# Patient Record
Sex: MALE | Race: ASIAN | Employment: OTHER | URBAN - METROPOLITAN AREA
[De-identification: names, ages, dates, MRNs, and addresses within clinical notes are randomized per-mention and may not be internally consistent; named-entity substitution may affect disease eponyms.]

---

## 2017-01-01 ENCOUNTER — CARE COORDINATION (OUTPATIENT)
Dept: ONCOLOGY | Facility: CLINIC | Age: 72
End: 2017-01-01

## 2017-01-01 ENCOUNTER — APPOINTMENT (OUTPATIENT)
Dept: LAB | Facility: CLINIC | Age: 72
End: 2017-01-01
Payer: MEDICARE

## 2017-01-01 ENCOUNTER — CARE COORDINATION (OUTPATIENT)
Dept: CARDIOLOGY | Facility: CLINIC | Age: 72
End: 2017-01-01

## 2017-01-01 ENCOUNTER — MEDICAL CORRESPONDENCE (OUTPATIENT)
Dept: HEALTH INFORMATION MANAGEMENT | Facility: CLINIC | Age: 72
End: 2017-01-01

## 2017-01-01 ENCOUNTER — OFFICE VISIT (OUTPATIENT)
Dept: TRANSPLANT | Facility: CLINIC | Age: 72
End: 2017-01-01
Payer: MEDICARE

## 2017-01-01 VITALS
HEART RATE: 67 BPM | WEIGHT: 143.1 LBS | RESPIRATION RATE: 16 BRPM | TEMPERATURE: 97.4 F | DIASTOLIC BLOOD PRESSURE: 81 MMHG | BODY MASS INDEX: 22.41 KG/M2 | OXYGEN SATURATION: 100 % | SYSTOLIC BLOOD PRESSURE: 159 MMHG

## 2017-01-01 DIAGNOSIS — C82.90 FOLLICULAR LYMPHOMA (H): Primary | ICD-10-CM

## 2017-01-01 DIAGNOSIS — C82.99 NODULAR LYMPHOMA OF EXTRANODAL AND/OR SOLID ORGAN SITE (H): Primary | ICD-10-CM

## 2017-01-01 DIAGNOSIS — I50.43 ACUTE ON CHRONIC COMBINED SYSTOLIC AND DIASTOLIC HEART FAILURE (H): ICD-10-CM

## 2017-01-01 DIAGNOSIS — R06.02 SHORTNESS OF BREATH: Primary | ICD-10-CM

## 2017-01-01 DIAGNOSIS — I25.5 ISCHEMIC CARDIOMYOPATHY: ICD-10-CM

## 2017-01-01 LAB
ALBUMIN SERPL-MCNC: 3.5 G/DL (ref 3.4–5)
ALP SERPL-CCNC: 100 U/L (ref 40–150)
ALT SERPL W P-5'-P-CCNC: 27 U/L (ref 0–70)
ANION GAP SERPL CALCULATED.3IONS-SCNC: 7 MMOL/L (ref 3–14)
ANION GAP SERPL CALCULATED.3IONS-SCNC: 9 MMOL/L (ref 3–14)
AST SERPL W P-5'-P-CCNC: 32 U/L (ref 0–45)
BASOPHILS # BLD AUTO: 0.1 10E9/L (ref 0–0.2)
BASOPHILS NFR BLD AUTO: 0.8 %
BILIRUB SERPL-MCNC: 0.5 MG/DL (ref 0.2–1.3)
BUN SERPL-MCNC: 24 MG/DL (ref 7–30)
BUN SERPL-MCNC: 27 MG/DL (ref 7–30)
CALCIUM SERPL-MCNC: 8.6 MG/DL (ref 8.5–10.1)
CALCIUM SERPL-MCNC: 8.9 MG/DL (ref 8.5–10.1)
CHLORIDE SERPL-SCNC: 96 MMOL/L (ref 94–109)
CHLORIDE SERPL-SCNC: 98 MMOL/L (ref 94–109)
CO2 SERPL-SCNC: 31 MMOL/L (ref 20–32)
CO2 SERPL-SCNC: 32 MMOL/L (ref 20–32)
CREAT SERPL-MCNC: 1.19 MG/DL (ref 0.66–1.25)
CREAT SERPL-MCNC: 1.33 MG/DL (ref 0.66–1.25)
DIFFERENTIAL METHOD BLD: ABNORMAL
EOSINOPHIL # BLD AUTO: 0.2 10E9/L (ref 0–0.7)
EOSINOPHIL NFR BLD AUTO: 3.2 %
ERYTHROCYTE [DISTWIDTH] IN BLOOD BY AUTOMATED COUNT: 12.9 % (ref 10–15)
GFR SERPL CREATININE-BSD FRML MDRD: 53 ML/MIN/1.7M2
GFR SERPL CREATININE-BSD FRML MDRD: 60 ML/MIN/1.7M2
GLUCOSE SERPL-MCNC: 108 MG/DL (ref 70–99)
GLUCOSE SERPL-MCNC: 99 MG/DL (ref 70–99)
HCT VFR BLD AUTO: 39.1 % (ref 40–53)
HGB BLD-MCNC: 13 G/DL (ref 13.3–17.7)
IMM GRANULOCYTES # BLD: 0 10E9/L (ref 0–0.4)
IMM GRANULOCYTES NFR BLD: 0.3 %
LDH SERPL L TO P-CCNC: 238 U/L (ref 85–227)
LYMPHOCYTES # BLD AUTO: 1.8 10E9/L (ref 0.8–5.3)
LYMPHOCYTES NFR BLD AUTO: 28.5 %
MCH RBC QN AUTO: 32.8 PG (ref 26.5–33)
MCHC RBC AUTO-ENTMCNC: 33.2 G/DL (ref 31.5–36.5)
MCV RBC AUTO: 99 FL (ref 78–100)
MONOCYTES # BLD AUTO: 0.7 10E9/L (ref 0–1.3)
MONOCYTES NFR BLD AUTO: 11.2 %
NEUTROPHILS # BLD AUTO: 3.5 10E9/L (ref 1.6–8.3)
NEUTROPHILS NFR BLD AUTO: 56 %
NRBC # BLD AUTO: 0 10*3/UL
NRBC BLD AUTO-RTO: 0 /100
NT-PROBNP SERPL-MCNC: 7088 PG/ML (ref 0–125)
PLATELET # BLD AUTO: 136 10E9/L (ref 150–450)
POTASSIUM SERPL-SCNC: 3.5 MMOL/L (ref 3.4–5.3)
POTASSIUM SERPL-SCNC: 3.9 MMOL/L (ref 3.4–5.3)
PROT SERPL-MCNC: 6.8 G/DL (ref 6.8–8.8)
RBC # BLD AUTO: 3.96 10E12/L (ref 4.4–5.9)
SODIUM SERPL-SCNC: 136 MMOL/L (ref 133–144)
SODIUM SERPL-SCNC: 137 MMOL/L (ref 133–144)
WBC # BLD AUTO: 6.3 10E9/L (ref 4–11)

## 2017-01-01 PROCEDURE — 99212 OFFICE O/P EST SF 10 MIN: CPT | Mod: ZF

## 2017-01-01 PROCEDURE — 85025 COMPLETE CBC W/AUTO DIFF WBC: CPT

## 2017-01-01 PROCEDURE — 36415 COLL VENOUS BLD VENIPUNCTURE: CPT

## 2017-01-01 PROCEDURE — 83615 LACTATE (LD) (LDH) ENZYME: CPT

## 2017-01-01 PROCEDURE — 80053 COMPREHEN METABOLIC PANEL: CPT

## 2017-01-01 RX ORDER — ATORVASTATIN CALCIUM 40 MG/1
40 TABLET, FILM COATED ORAL
COMMUNITY
Start: 2016-08-23 | End: 2017-01-01

## 2017-01-01 RX ORDER — TORSEMIDE 20 MG/1
20 TABLET ORAL DAILY
COMMUNITY

## 2017-01-01 ASSESSMENT — PAIN SCALES - GENERAL: PAINLEVEL: MILD PAIN (3)

## 2017-05-01 NOTE — PROGRESS NOTES
Reason for Outgoing Call:   To notify pt that lab appt will be added prior to annual visit tomorrow with Dr. Ernandez unless he has had labs done in recent past  Patient Questions/Concerns:   Pt states he has had several visits with cardiology at [facility] and wishes that we access those records for Dr. Ernandez's review.  Nursing Action/Patient Instruction:  - Accessed [facility] records via Care Everywhere while on the phone with pt and explained to him that we are able to view cardiology exam reports  - Recent labs did not include CBC, CMP or LDH. Lab orders placed for tomorrow's appt and message to [facility] Scheduling  - Introduced self and my role as RN Care Coordinator at [facility] for Dr Ernandez's pts.  Patient Response/Evaluation:   Pt voiced understanding and apreciation of above instructions and information and denied further questions

## 2017-05-02 NOTE — NURSING NOTE
"Oncology Rooming Note    May 2, 2017 6:04 PM   Tristen Beckwith is a 72 year old male who presents for:    Chief Complaint   Patient presents with     Oncology Clinic Visit     NHL     Initial Vitals: /81  Pulse 67  Temp 97.4  F (36.3  C) (Oral)  Resp 16  Wt 64.9 kg (143 lb 1.6 oz)  SpO2 100%  BMI 22.41 kg/m2 Estimated body mass index is 22.41 kg/(m^2) as calculated from the following:    Height as of 11/6/14: 1.702 m (5' 7\").    Weight as of this encounter: 64.9 kg (143 lb 1.6 oz). Body surface area is 1.75 meters squared.  Mild Pain (3) Comment: Data Unavailable   No LMP for male patient.  Allergies reviewed: Yes  Medications reviewed: Yes    Medications: Medication refills not needed today.  Pharmacy name entered into EPIC:    [facility] - [place], [place] - [address] [id number] - [place], [place] - [address]  [facility] [address] [place], [place] - [address] AT [facility] & [address] [facility] STORE [address] [place], [place] - [address] AT [facility]    Clinical concerns: n/a     7 minutes for nursing intake (face to face time)     SARAH METZGER CMA              "

## 2017-05-02 NOTE — MR AVS SNAPSHOT
"              After Visit Summary   5/2/2017    Tristen Beckwith    MRN: [id number]           Patient Information     Date Of Birth          1945        Visit Information        Provider Department      5/2/2017 5:30 PM Daisy Ernandez MD [facility] Blood and Marrow Transplant        Today's Diagnoses     Nodular lymphoma of extranodal and/or solid organ site (H)    -  1          [facility] ([facility])  [address] [place] [address]  Phone: [phone]  Clinic Hours:   Monday-Thursday: 7am to 7pm   Friday: 7am to 5:30pm   Weekends and holidays:    8am to noon (in general)  If your fever is 100.5  or greater,   call the clinic.  After hours call the   hospital at [phone] or   [phone]. Ask for the BMT   fellow on-call            Follow-ups after your visit        Who to contact     If you have questions or need follow up information about today's clinic visit or your schedule please contact [facility] BLOOD AND MARROW TRANSPLANT directly at [phone].  Normal or non-critical lab and imaging results will be communicated to you by Neighborlandhart, letter or phone within 4 business days after the clinic has received the results. If you do not hear from us within 7 days, please contact the clinic through Enkiat or phone. If you have a critical or abnormal lab result, we will notify you by phone as soon as possible.  Submit refill requests through Seesearch or call your pharmacy and they will forward the refill request to us. Please allow 3 business days for your refill to be completed.          Additional Information About Your Visit        Seesearch Information     Seesearch lets you send messages to your doctor, view your test results, renew your prescriptions, schedule appointments and more. To sign up, go to [email or web address] . Click on \"Log in\" on the left side of the screen, which will take you to the Welcome page. Then click on \"Sign up Now\" on the right side of the page.     You " will be asked to enter the access code listed below, as well as some personal information. Please follow the directions to create your username and password.     Your access code is: [email or web address]  Expires: 2017  6:31 AM     Your access code will  in 90 days. If you need help or a new code, please call your [place] clinic or [phone].        Care EveryWhere ID     This is your Care EveryWhere ID. This could be used by other organizations to access your [place] medical records  [id number]        Your Vitals Were     Pulse Temperature Respirations Pulse Oximetry BMI (Body Mass Index)       67 97.4  F (36.3  C) (Oral) 16 100% 22.41 kg/m2        Blood Pressure from Last 3 Encounters:   17 159/81   16 139/71   14 154/81    Weight from Last 3 Encounters:   17 64.9 kg (143 lb 1.6 oz)   16 64.4 kg (142 lb)   14 64.9 kg (143 lb)              We Performed the Following     CBC with platelets differential     Comprehensive metabolic panel     Lactate Dehydrogenase        Recent Review Flowsheet Data     BMT Recent Results Latest Ref Rng & Units 2014 4/15/2014 2014 2014 10/27/2014 2016 2017    WBC 4.0 - 11.0 10e9/L - 4.3 4.4 - 5.5 6.0 6.3    Hemoglobin 13.3 - 17.7 g/dL - 11.8(L) 12.2(L) - 13.1(L) 13.3 13.0(L)    Platelet Count 150 - 450 10e9/L - 150 141(L) - 121(L) 97(L) 136(L)    Neutrophils (Absolute) 1.6 - 8.3 10e9/L - 2.8 2.4 - 3.1 3.2 3.5    INR 0.86 - 1.14 - - - - - - -    Sodium 133 - 144 mmol/L 136 133 136 - 132(L) 133 137    Potassium 3.4 - 5.3 mmol/L 4.4 4.4 4.4 - 4.0 3.9 3.5    Chloride 94 - 109 mmol/L 94 93(L) 95 - 99 95 96    Glucose 70 - 99 mg/dL 78 81 114(H) 111(H) 92 86 108(H)    Urea Nitrogen 7 - 30 mg/dL 15 14 18 - 19 21 24    Creatinine 0.66 - 1.25 mg/dL 0.82 1.10 1.09 - 1.04 1.15 1.19    Calcium (Total) 8.5 - 10.1 mg/dL 8.6 9.2 9.1 - 9.2 8.9 8.6    Protein (Total) 6.8 - 8.8 g/dL - - 6.5(L) - 6.7(L) 7.4 6.8    Albumin 3.4 - 5.0 g/dL -  - 3.7 - 3.8 3.8 3.5    Alkaline Phosphatase 40 - 150 U/L - - 369(H) - 90 90 100    AST 0 - 45 U/L - - 34 - 37 35 32    ALT 0 - 70 U/L - - 27 - 45 40 27    MCV 78 - 100 fl - 97 93 - 96 96 99               Primary Care Provider Office Phone # Fax #    Daisy Ernandez [phone]        [facility] [address] [facility] [address]  [facility] [address]        Thank you!     Thank you for choosing [facility] BLOOD AND MARROW TRANSPLANT  for your care. Our goal is always to provide you with excellent care. Hearing back from our patients is one way we can continue to improve our services. Please take a few minutes to complete the written survey that you may receive in the mail after your visit with us. Thank you!             Your Updated Medication List - Protect others around you: Learn how to safely use, store and throw away your medicines at www.disposemymeds.org.          This list is accurate as of: 5/2/17 11:59 PM.  Always use your most recent med list.                   Brand Name Dispense Instructions for use    aspirin 325 MG tablet      Take 1/2 tablet daily       atorvastatin 40 MG tablet    LIPITOR     Take 40 mg by mouth       lisinopril 40 MG tablet    PRINIVIL/ZESTRIL    90 tablet    Take 1 tablet by mouth daily.       METOPROLOL SUCCINATE PO      Take 100 mg by mouth daily.       SOY PROTEIN SHAKE Powd      Take 1 dose by mouth as needed. Pt reports taking ~2 per week       torsemide 20 MG tablet    DEMADEX     Take 20 mg by mouth daily

## 2017-05-02 NOTE — PROGRESS NOTES
Hematology/Oncology Progress Note:  Tristen Beckwith    Date: 05/02/2017  Primary Heme/BMT: Dr. Ernandez       PATIENT SUMMARY:  Mr. Beckwith is a 70 y/o male with h/o follicular lymphoma in complete remission after 6 cycles bendamustine/rituxan by PET/CT and BMBx.   Complications included : Pneumonia and hospitalization in 3/26 - 3/29/2015 with syncopal vs. seizure episode at home, severe hyponatremia, dehydration, and positive Influenze A testing. He quickly recovered and was discharge on Tamiflu.    Today,  reports feeling quite well, generally healthy. Was diagnosed with CHF and pulmonary hypertension, EF is around 40%, now on diuretics doing better.   Still unable to exercise vigorously and is more SOB with exertion, but still very active.  No edema.   Otherwise no lumps, no night sweats, Denies chest pain or chest pressure. No dizziness. eating or drinking well  no URI, still mild cough, but much better.     ROS:  He denies headache, sore throat and SOB. Denies abdominal pain or nausea/vomiting. Denies any recent diarrhea or constipation. Denies any urinary complaints.  No difficulty with extremity swelling currently.  No bleeding.     PHYSICAL EXAM:  /81  Pulse 67  Temp 97.4  F (36.3  C) (Oral)  Resp 16  Wt 64.9 kg (143 lb 1.6 oz)  SpO2 100%  BMI 22.41 kg/m2  Vitals:    05/02/17 1804   Weight: 64.9 kg (143 lb 1.6 oz)   GEN: Pleasant. Generally well-appearing male, seen sitting at EOB eating breakfast. NAD. KPS 90%  SKIN: No visible lesions or rash on exposed skin surfaces.   HEENT: NC/AT. Anicteric sclera. Oral mucosa pink and moist.   LUNGS: Lungs clear to auscultation bilaterally; no w/r/r.  CV: Regular rate and rhythm. No murmurs. No edema.  GI: Normoactive bowel sounds. Abdomen soft and non-tender.  MSK: Warm, well-perfused. Extremities normal in appearance.    NEURO: Alert and appropriate Speech normal, CNs 2-12 grossly intact. Extensive neuro exam not repeated as has been done by Neuro team  multiple times, again per pt.  PSYCH: Mentation and affect appear normal.     LABS:  Lab Results   Component Value Date    WBC 6.3 05/02/2017    ANEU 3.5 05/02/2017    HGB 13.0 (L) 05/02/2017    HCT 39.1 (L) 05/02/2017     (L) 05/02/2017     05/02/2017    POTASSIUM 3.5 05/02/2017    CHLORIDE 96 05/02/2017    CO2 32 05/02/2017     (H) 05/02/2017    BUN 24 05/02/2017    CR 1.19 05/02/2017    MAG 1.8 03/26/2014    INR 1.04 03/26/2014     IMAGING/PROCEDURES:  CXR (3/26/14): No acute cardiopulmonary abnormalities.    CT CAP 10/28/2014  IMPRESSION    1. Since 05/21/2014, slight increase in consolidation in the  azygoesophageal recess and pleural-based consolidation in the right  lung base which was hypermetabolic on 5/21/2014. This is  indeterminate, and could represent infection, fibrosis, less likely  neoplasm. Continued followup is recommended.  2. No lymphadenopathy in the chest, abdomen, or pelvis to suggest  lymphoma recurrence.    ASSESSMENT/PLAN:  Mr. Beckwith is a 70 y/o male with h/o follicular lymphoma in complete remission after 6 cycles bendamustine/rituxan.  He clinically continues to be in CR without evidence of active lymphoma. That is great . No infections or new complications.        Heme/Onc: H/o follicular lymphoma in complete remission after 6 cycles Bendamustine/Rituxan. Last received therapy in 09/2013. He is CR based on PET/CT and BMBx. LDH was transiently elevated in 4/2014 -and again today. Unclear etiology -- but very mild and would not require further w/u.   CT CAP from 10/2014 confirmed ongoing CR of his FL.    Clinically ongoing CR.    Hematolgoy:  Anemia resolved.  Hgb normal today.   Plt stable.    LDH is lower.    CVS: recent dg of CHF; prior extensive CAD with MI. Now without angina. F/u with cardiology     Neuro: All inactive at this time.    Pulmonary:   Resolved influenza and pneumonia in 4/2014. Patient has extensive travel history to [place] and his illness started after  his return. Resolved now.   Overall doing great, happy to see his ongoing remission of FL.  LDH is lower than before.       Plan:  RTC in  1 year     Daisy Ernandez MD

## 2017-09-19 NOTE — PROGRESS NOTES
Received message that patient had called to make an appt with Dr. No, but we were unsure what cardiac issue he has that he needed to be seen.  He reports that he has left ventricular dysfunction with an EF in 40's and is followed by Dr. Tommy Villalba at [facility]. He is planning a trip to [facility] and Dr. Villalba had wanted him to see Dr. No for a second opinion on whether he was stable enough to go. As he is leaving on October 2nd, we discussed that Dr. No would not be available so quickly to see him as a new patient, order updated testing, and give an opinion. He is very ambulatory, able to mow the grass with a push (self-propelled) mower, is on lisinopril, metoprolol and torsemide, which he is weaning.  His weight has been stable and denies shortness of breath. His EF has been in the 40's for years. He feels he is fine and does not need a second opinion at this point in time.  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  He has a f/u appt with Dr. Villalba next week prior to his departure the following week.  At this time, we will  not make him an appt, but will be happy to see him in the future if need be.

## 2018-01-01 ENCOUNTER — HOSPITAL ENCOUNTER (OUTPATIENT)
Facility: CLINIC | Age: 73
End: 2018-01-01
Attending: INTERNAL MEDICINE | Admitting: INTERNAL MEDICINE
Payer: MEDICARE

## 2018-01-01 ENCOUNTER — APPOINTMENT (OUTPATIENT)
Dept: GENERAL RADIOLOGY | Facility: CLINIC | Age: 73
DRG: 840 | End: 2018-01-01
Attending: PHYSICIAN ASSISTANT
Payer: MEDICARE

## 2018-01-01 ENCOUNTER — TELEPHONE (OUTPATIENT)
Dept: SURGERY | Facility: CLINIC | Age: 73
End: 2018-01-01

## 2018-01-01 ENCOUNTER — APPOINTMENT (OUTPATIENT)
Dept: ONCOLOGY | Facility: CLINIC | Age: 73
DRG: 840 | End: 2018-01-01
Payer: MEDICARE

## 2018-01-01 ENCOUNTER — APPOINTMENT (OUTPATIENT)
Dept: GENERAL RADIOLOGY | Facility: CLINIC | Age: 73
DRG: 840 | End: 2018-01-01
Attending: INTERNAL MEDICINE
Payer: MEDICARE

## 2018-01-01 ENCOUNTER — OFFICE VISIT (OUTPATIENT)
Dept: TRANSPLANT | Facility: CLINIC | Age: 73
DRG: 840 | End: 2018-01-01
Payer: MEDICARE

## 2018-01-01 ENCOUNTER — CARE COORDINATION (OUTPATIENT)
Dept: ONCOLOGY | Facility: CLINIC | Age: 73
End: 2018-01-01

## 2018-01-01 ENCOUNTER — APPOINTMENT (OUTPATIENT)
Dept: PET IMAGING | Facility: CLINIC | Age: 73
DRG: 840 | End: 2018-01-01
Attending: PHYSICIAN ASSISTANT
Payer: MEDICARE

## 2018-01-01 ENCOUNTER — HOSPITAL ENCOUNTER (OUTPATIENT)
Facility: CLINIC | Age: 73
Discharge: HOME | End: 2018-01-19
Attending: COLON & RECTAL SURGERY | Admitting: COLON & RECTAL SURGERY
Payer: MEDICARE

## 2018-01-01 ENCOUNTER — APPOINTMENT (OUTPATIENT)
Dept: CARDIOLOGY | Facility: CLINIC | Age: 73
DRG: 840 | End: 2018-01-01
Attending: INTERNAL MEDICINE
Payer: MEDICARE

## 2018-01-01 ENCOUNTER — HOSPITAL ENCOUNTER (INPATIENT)
Facility: CLINIC | Age: 73
LOS: 9 days | DRG: 840 | End: 2018-02-02
Attending: INTERNAL MEDICINE | Admitting: INTERNAL MEDICINE
Payer: MEDICARE

## 2018-01-01 ENCOUNTER — CARE COORDINATION (OUTPATIENT)
Dept: SURGERY | Facility: CLINIC | Age: 73
End: 2018-01-01

## 2018-01-01 ENCOUNTER — TELEPHONE (OUTPATIENT)
Dept: GASTROENTEROLOGY | Facility: CLINIC | Age: 73
End: 2018-01-01

## 2018-01-01 ENCOUNTER — ANESTHESIA EVENT (OUTPATIENT)
Dept: INTENSIVE CARE | Facility: CLINIC | Age: 73
DRG: 840 | End: 2018-01-01
Payer: MEDICARE

## 2018-01-01 ENCOUNTER — APPOINTMENT (OUTPATIENT)
Dept: CARDIOLOGY | Facility: CLINIC | Age: 73
DRG: 840 | End: 2018-01-01
Attending: PHYSICIAN ASSISTANT
Payer: MEDICARE

## 2018-01-01 ENCOUNTER — TELEPHONE (OUTPATIENT)
Dept: ONCOLOGY | Facility: CLINIC | Age: 73
End: 2018-01-01

## 2018-01-01 ENCOUNTER — ANESTHESIA (OUTPATIENT)
Dept: INTENSIVE CARE | Facility: CLINIC | Age: 73
DRG: 840 | End: 2018-01-01
Payer: MEDICARE

## 2018-01-01 VITALS
SYSTOLIC BLOOD PRESSURE: 88 MMHG | RESPIRATION RATE: 30 BRPM | OXYGEN SATURATION: 39 % | HEIGHT: 68 IN | TEMPERATURE: 97.6 F | DIASTOLIC BLOOD PRESSURE: 61 MMHG | WEIGHT: 142.6 LBS | HEART RATE: 96 BPM | BODY MASS INDEX: 21.61 KG/M2

## 2018-01-01 VITALS
HEART RATE: 84 BPM | HEIGHT: 67 IN | WEIGHT: 128.3 LBS | RESPIRATION RATE: 20 BRPM | SYSTOLIC BLOOD PRESSURE: 104 MMHG | BODY MASS INDEX: 20.14 KG/M2 | TEMPERATURE: 98 F | OXYGEN SATURATION: 96 % | DIASTOLIC BLOOD PRESSURE: 68 MMHG

## 2018-01-01 VITALS
OXYGEN SATURATION: 97 % | DIASTOLIC BLOOD PRESSURE: 59 MMHG | RESPIRATION RATE: 22 BRPM | SYSTOLIC BLOOD PRESSURE: 92 MMHG

## 2018-01-01 DIAGNOSIS — C83.33 DIFFUSE LARGE B-CELL LYMPHOMA OF INTRA-ABDOMINAL LYMPH NODES (H): Primary | ICD-10-CM

## 2018-01-01 DIAGNOSIS — I10 ESSENTIAL HYPERTENSION: ICD-10-CM

## 2018-01-01 DIAGNOSIS — C83.398 DIFFUSE LARGE B-CELL LYMPHOMA OF EXTRANODAL SITE: ICD-10-CM

## 2018-01-01 DIAGNOSIS — C83.30 DIFFUSE LARGE B-CELL LYMPHOMA, UNSPECIFIED BODY REGION (H): ICD-10-CM

## 2018-01-01 DIAGNOSIS — Z86.0100 HISTORY OF COLONIC POLYPS: Primary | ICD-10-CM

## 2018-01-01 DIAGNOSIS — J96.01 ACUTE RESPIRATORY FAILURE WITH HYPOXIA (H): Primary | ICD-10-CM

## 2018-01-01 DIAGNOSIS — C83.398 DIFFUSE LARGE B-CELL LYMPHOMA OF EXTRANODAL SITE: Primary | ICD-10-CM

## 2018-01-01 DIAGNOSIS — C82.99 NODULAR LYMPHOMA OF EXTRANODAL AND/OR SOLID ORGAN SITE (H): ICD-10-CM

## 2018-01-01 LAB
ABO + RH BLD: NORMAL
ABO + RH BLD: NORMAL
ALBUMIN SERPL ELPH-MCNC: 3.6 G/DL (ref 3.7–5.1)
ALBUMIN SERPL-MCNC: 2 G/DL (ref 3.4–5)
ALBUMIN SERPL-MCNC: 2.3 G/DL (ref 3.4–5)
ALBUMIN SERPL-MCNC: 2.6 G/DL (ref 3.4–5)
ALBUMIN SERPL-MCNC: 2.7 G/DL (ref 3.4–5)
ALBUMIN SERPL-MCNC: 2.8 G/DL (ref 3.4–5)
ALBUMIN SERPL-MCNC: 3.4 G/DL (ref 3.4–5)
ALBUMIN UR-MCNC: 10 MG/DL
ALP SERPL-CCNC: 40 U/L (ref 40–150)
ALP SERPL-CCNC: 46 U/L (ref 40–150)
ALP SERPL-CCNC: 57 U/L (ref 40–150)
ALP SERPL-CCNC: 60 U/L (ref 40–150)
ALP SERPL-CCNC: 61 U/L (ref 40–150)
ALP SERPL-CCNC: 66 U/L (ref 40–150)
ALP SERPL-CCNC: 66 U/L (ref 40–150)
ALP SERPL-CCNC: 84 U/L (ref 40–150)
ALPHA1 GLOB SERPL ELPH-MCNC: 0.3 G/DL (ref 0.2–0.4)
ALPHA2 GLOB SERPL ELPH-MCNC: 1 G/DL (ref 0.5–0.9)
ALT SERPL W P-5'-P-CCNC: 20 U/L (ref 0–70)
ALT SERPL W P-5'-P-CCNC: 21 U/L (ref 0–70)
ALT SERPL W P-5'-P-CCNC: 24 U/L (ref 0–70)
ALT SERPL W P-5'-P-CCNC: 26 U/L (ref 0–70)
ALT SERPL W P-5'-P-CCNC: 2848 U/L (ref 0–70)
ALT SERPL W P-5'-P-CCNC: 84 U/L (ref 0–70)
AMYLASE SERPL-CCNC: 353 U/L (ref 30–110)
ANION GAP SERPL CALCULATED.3IONS-SCNC: 10 MMOL/L (ref 3–14)
ANION GAP SERPL CALCULATED.3IONS-SCNC: 10 MMOL/L (ref 3–14)
ANION GAP SERPL CALCULATED.3IONS-SCNC: 11 MMOL/L (ref 3–14)
ANION GAP SERPL CALCULATED.3IONS-SCNC: 11 MMOL/L (ref 3–14)
ANION GAP SERPL CALCULATED.3IONS-SCNC: 15 MMOL/L (ref 3–14)
ANION GAP SERPL CALCULATED.3IONS-SCNC: 16 MMOL/L (ref 3–14)
ANION GAP SERPL CALCULATED.3IONS-SCNC: 16 MMOL/L (ref 3–14)
ANION GAP SERPL CALCULATED.3IONS-SCNC: 17 MMOL/L (ref 3–14)
ANION GAP SERPL CALCULATED.3IONS-SCNC: 18 MMOL/L (ref 3–14)
ANION GAP SERPL CALCULATED.3IONS-SCNC: 18 MMOL/L (ref 3–14)
ANION GAP SERPL CALCULATED.3IONS-SCNC: 19 MMOL/L (ref 3–14)
ANION GAP SERPL CALCULATED.3IONS-SCNC: 20 MMOL/L (ref 3–14)
ANION GAP SERPL CALCULATED.3IONS-SCNC: 21 MMOL/L (ref 3–14)
ANION GAP SERPL CALCULATED.3IONS-SCNC: 22 MMOL/L (ref 3–14)
ANION GAP SERPL CALCULATED.3IONS-SCNC: 23 MMOL/L (ref 3–14)
ANION GAP SERPL CALCULATED.3IONS-SCNC: 28 MMOL/L (ref 3–14)
ANION GAP SERPL CALCULATED.3IONS-SCNC: 34 MMOL/L (ref 3–14)
APPEARANCE UR: CLEAR
APTT PPP: 25 SEC (ref 22–37)
APTT PPP: 28 SEC (ref 22–37)
APTT PPP: 30 SEC (ref 22–37)
APTT PPP: 58 SEC (ref 22–37)
AST SERPL W P-5'-P-CCNC: 112 U/L (ref 0–45)
AST SERPL W P-5'-P-CCNC: 124 U/L (ref 0–45)
AST SERPL W P-5'-P-CCNC: 150 U/L (ref 0–45)
AST SERPL W P-5'-P-CCNC: 4526 U/L (ref 0–45)
AST SERPL W P-5'-P-CCNC: 79 U/L (ref 0–45)
AST SERPL W P-5'-P-CCNC: 86 U/L (ref 0–45)
AST SERPL W P-5'-P-CCNC: 87 U/L (ref 0–45)
AST SERPL W P-5'-P-CCNC: 98 U/L (ref 0–45)
B-GLOBULIN SERPL ELPH-MCNC: 0.7 G/DL (ref 0.6–1)
BACTERIA SPEC CULT: NORMAL
BASE DEFICIT BLDA-SCNC: 15 MMOL/L
BASE DEFICIT BLDA-SCNC: 5.4 MMOL/L
BASE DEFICIT BLDA-SCNC: 6.4 MMOL/L
BASE DEFICIT BLDA-SCNC: 6.4 MMOL/L
BASE DEFICIT BLDA-SCNC: 6.7 MMOL/L
BASE DEFICIT BLDA-SCNC: 7.4 MMOL/L
BASE DEFICIT BLDA-SCNC: 8.3 MMOL/L
BASE DEFICIT BLDV-SCNC: 4.7 MMOL/L
BASE DEFICIT BLDV-SCNC: 5.7 MMOL/L
BASE DEFICIT BLDV-SCNC: 6.9 MMOL/L
BASE EXCESS BLDA CALC-SCNC: 0.8 MMOL/L
BASE EXCESS BLDA CALC-SCNC: 8.4 MMOL/L
BASE EXCESS BLDV CALC-SCNC: 4.3 MMOL/L
BASE EXCESS BLDV CALC-SCNC: 5.3 MMOL/L
BASOPHILS # BLD AUTO: 0 10E9/L (ref 0–0.2)
BASOPHILS # BLD AUTO: 0.1 10E9/L (ref 0–0.2)
BASOPHILS NFR BLD AUTO: 0 %
BASOPHILS NFR BLD AUTO: 0.1 %
BASOPHILS NFR BLD AUTO: 0.2 %
BASOPHILS NFR BLD AUTO: 0.4 %
BASOPHILS NFR BLD AUTO: 1 %
BILIRUB DIRECT SERPL-MCNC: 0.1 MG/DL (ref 0–0.2)
BILIRUB DIRECT SERPL-MCNC: 0.1 MG/DL (ref 0–0.2)
BILIRUB DIRECT SERPL-MCNC: 0.2 MG/DL (ref 0–0.2)
BILIRUB DIRECT SERPL-MCNC: 0.3 MG/DL (ref 0–0.2)
BILIRUB DIRECT SERPL-MCNC: 0.4 MG/DL (ref 0–0.2)
BILIRUB DIRECT SERPL-MCNC: 0.8 MG/DL (ref 0–0.2)
BILIRUB SERPL-MCNC: 0.4 MG/DL (ref 0.2–1.3)
BILIRUB SERPL-MCNC: 0.4 MG/DL (ref 0.2–1.3)
BILIRUB SERPL-MCNC: 0.5 MG/DL (ref 0.2–1.3)
BILIRUB SERPL-MCNC: 0.5 MG/DL (ref 0.2–1.3)
BILIRUB SERPL-MCNC: 0.8 MG/DL (ref 0.2–1.3)
BILIRUB SERPL-MCNC: 0.8 MG/DL (ref 0.2–1.3)
BILIRUB SERPL-MCNC: 0.9 MG/DL (ref 0.2–1.3)
BILIRUB SERPL-MCNC: 1.3 MG/DL (ref 0.2–1.3)
BILIRUB UR QL STRIP: NEGATIVE
BLD GP AB SCN SERPL QL: NORMAL
BLD PROD TYP BPU: NORMAL
BLD UNIT ID BPU: 0
BLOOD BANK CMNT PATIENT-IMP: NORMAL
BLOOD PRODUCT CODE: NORMAL
BPU ID: NORMAL
BUN SERPL-MCNC: 49 MG/DL (ref 7–30)
BUN SERPL-MCNC: 53 MG/DL (ref 7–30)
BUN SERPL-MCNC: 55 MG/DL (ref 7–30)
BUN SERPL-MCNC: 56 MG/DL (ref 7–30)
BUN SERPL-MCNC: 56 MG/DL (ref 7–30)
BUN SERPL-MCNC: 57 MG/DL (ref 7–30)
BUN SERPL-MCNC: 58 MG/DL (ref 7–30)
BUN SERPL-MCNC: 59 MG/DL (ref 7–30)
BUN SERPL-MCNC: 59 MG/DL (ref 7–30)
BUN SERPL-MCNC: 60 MG/DL (ref 7–30)
BUN SERPL-MCNC: 61 MG/DL (ref 7–30)
BUN SERPL-MCNC: 62 MG/DL (ref 7–30)
BUN SERPL-MCNC: 62 MG/DL (ref 7–30)
BUN SERPL-MCNC: 63 MG/DL (ref 7–30)
BUN SERPL-MCNC: 63 MG/DL (ref 7–30)
BUN SERPL-MCNC: 64 MG/DL (ref 7–30)
BUN SERPL-MCNC: 65 MG/DL (ref 7–30)
BUN SERPL-MCNC: 66 MG/DL (ref 7–30)
BUN SERPL-MCNC: 69 MG/DL (ref 7–30)
BUN SERPL-MCNC: 74 MG/DL (ref 7–30)
BUN SERPL-MCNC: 76 MG/DL (ref 7–30)
BUN SERPL-MCNC: 79 MG/DL (ref 7–30)
BUN SERPL-MCNC: 81 MG/DL (ref 7–30)
BUN SERPL-MCNC: 89 MG/DL (ref 7–30)
C COLI+JEJUNI+LARI FUSA STL QL NAA+PROBE: NOT DETECTED
C DIFF TOX B STL QL: NEGATIVE
CA-I BLD-MCNC: 3.5 MG/DL (ref 4.4–5.2)
CA-I BLD-MCNC: 3.5 MG/DL (ref 4.4–5.2)
CA-I BLD-MCNC: 3.6 MG/DL (ref 4.4–5.2)
CA-I BLD-MCNC: 5.1 MG/DL (ref 4.4–5.2)
CA-I SERPL ISE-MCNC: 4.1 MG/DL (ref 4.4–5.2)
CA-I SERPL ISE-MCNC: 4.2 MG/DL (ref 4.4–5.2)
CALCIUM SERPL-MCNC: 6.1 MG/DL (ref 8.5–10.1)
CALCIUM SERPL-MCNC: 6.5 MG/DL (ref 8.5–10.1)
CALCIUM SERPL-MCNC: 6.8 MG/DL (ref 8.5–10.1)
CALCIUM SERPL-MCNC: 6.9 MG/DL (ref 8.5–10.1)
CALCIUM SERPL-MCNC: 7 MG/DL (ref 8.5–10.1)
CALCIUM SERPL-MCNC: 7.1 MG/DL (ref 8.5–10.1)
CALCIUM SERPL-MCNC: 7.2 MG/DL (ref 8.5–10.1)
CALCIUM SERPL-MCNC: 7.3 MG/DL (ref 8.5–10.1)
CALCIUM SERPL-MCNC: 7.6 MG/DL (ref 8.5–10.1)
CALCIUM SERPL-MCNC: 7.6 MG/DL (ref 8.5–10.1)
CALCIUM SERPL-MCNC: 7.7 MG/DL (ref 8.5–10.1)
CALCIUM SERPL-MCNC: 7.8 MG/DL (ref 8.5–10.1)
CALCIUM SERPL-MCNC: 7.9 MG/DL (ref 8.5–10.1)
CALCIUM SERPL-MCNC: 8 MG/DL (ref 8.5–10.1)
CALCIUM SERPL-MCNC: 8 MG/DL (ref 8.5–10.1)
CALCIUM SERPL-MCNC: 8.1 MG/DL (ref 8.5–10.1)
CALCIUM SERPL-MCNC: 8.1 MG/DL (ref 8.5–10.1)
CALCIUM SERPL-MCNC: 8.2 MG/DL (ref 8.5–10.1)
CALCIUM SERPL-MCNC: 8.3 MG/DL (ref 8.5–10.1)
CALCIUM SERPL-MCNC: 8.4 MG/DL (ref 8.5–10.1)
CALCIUM SERPL-MCNC: 8.5 MG/DL (ref 8.5–10.1)
CALCIUM SERPL-MCNC: 8.9 MG/DL (ref 8.5–10.1)
CHLORIDE SERPL-SCNC: 100 MMOL/L (ref 94–109)
CHLORIDE SERPL-SCNC: 100 MMOL/L (ref 94–109)
CHLORIDE SERPL-SCNC: 101 MMOL/L (ref 94–109)
CHLORIDE SERPL-SCNC: 101 MMOL/L (ref 94–109)
CHLORIDE SERPL-SCNC: 102 MMOL/L (ref 94–109)
CHLORIDE SERPL-SCNC: 102 MMOL/L (ref 94–109)
CHLORIDE SERPL-SCNC: 103 MMOL/L (ref 94–109)
CHLORIDE SERPL-SCNC: 105 MMOL/L (ref 94–109)
CHLORIDE SERPL-SCNC: 105 MMOL/L (ref 94–109)
CHLORIDE SERPL-SCNC: 107 MMOL/L (ref 94–109)
CHLORIDE SERPL-SCNC: 93 MMOL/L (ref 94–109)
CHLORIDE SERPL-SCNC: 94 MMOL/L (ref 94–109)
CHLORIDE SERPL-SCNC: 96 MMOL/L (ref 94–109)
CHLORIDE SERPL-SCNC: 97 MMOL/L (ref 94–109)
CHLORIDE SERPL-SCNC: 97 MMOL/L (ref 94–109)
CHLORIDE SERPL-SCNC: 98 MMOL/L (ref 94–109)
CHLORIDE SERPL-SCNC: 98 MMOL/L (ref 94–109)
CHLORIDE SERPL-SCNC: 99 MMOL/L (ref 94–109)
CK SERPL-CCNC: 389 U/L (ref 30–300)
CO2 SERPL-SCNC: 10 MMOL/L (ref 20–32)
CO2 SERPL-SCNC: 13 MMOL/L (ref 20–32)
CO2 SERPL-SCNC: 15 MMOL/L (ref 20–32)
CO2 SERPL-SCNC: 16 MMOL/L (ref 20–32)
CO2 SERPL-SCNC: 16 MMOL/L (ref 20–32)
CO2 SERPL-SCNC: 18 MMOL/L (ref 20–32)
CO2 SERPL-SCNC: 18 MMOL/L (ref 20–32)
CO2 SERPL-SCNC: 19 MMOL/L (ref 20–32)
CO2 SERPL-SCNC: 20 MMOL/L (ref 20–32)
CO2 SERPL-SCNC: 21 MMOL/L (ref 20–32)
CO2 SERPL-SCNC: 22 MMOL/L (ref 20–32)
CO2 SERPL-SCNC: 23 MMOL/L (ref 20–32)
CO2 SERPL-SCNC: 25 MMOL/L (ref 20–32)
CO2 SERPL-SCNC: 28 MMOL/L (ref 20–32)
CO2 SERPL-SCNC: 28 MMOL/L (ref 20–32)
CO2 SERPL-SCNC: 29 MMOL/L (ref 20–32)
CO2 SERPL-SCNC: 29 MMOL/L (ref 20–32)
CO2 SERPL-SCNC: 30 MMOL/L (ref 20–32)
COLONOSCOPY: NORMAL
COLOR UR AUTO: YELLOW
COPATH REPORT: NORMAL
CREAT SERPL-MCNC: 1 MG/DL (ref 0.66–1.25)
CREAT SERPL-MCNC: 1.05 MG/DL (ref 0.66–1.25)
CREAT SERPL-MCNC: 1.08 MG/DL (ref 0.66–1.25)
CREAT SERPL-MCNC: 1.11 MG/DL (ref 0.66–1.25)
CREAT SERPL-MCNC: 1.14 MG/DL (ref 0.66–1.25)
CREAT SERPL-MCNC: 1.15 MG/DL (ref 0.66–1.25)
CREAT SERPL-MCNC: 1.15 MG/DL (ref 0.66–1.25)
CREAT SERPL-MCNC: 1.18 MG/DL (ref 0.66–1.25)
CREAT SERPL-MCNC: 1.21 MG/DL (ref 0.66–1.25)
CREAT SERPL-MCNC: 1.27 MG/DL (ref 0.66–1.25)
CREAT SERPL-MCNC: 1.29 MG/DL (ref 0.66–1.25)
CREAT SERPL-MCNC: 1.39 MG/DL (ref 0.66–1.25)
CREAT SERPL-MCNC: 1.47 MG/DL (ref 0.66–1.25)
CREAT SERPL-MCNC: 1.59 MG/DL (ref 0.66–1.25)
CREAT SERPL-MCNC: 1.61 MG/DL (ref 0.66–1.25)
CREAT SERPL-MCNC: 1.66 MG/DL (ref 0.66–1.25)
CREAT SERPL-MCNC: 1.67 MG/DL (ref 0.66–1.25)
CREAT SERPL-MCNC: 1.67 MG/DL (ref 0.66–1.25)
CREAT SERPL-MCNC: 1.87 MG/DL (ref 0.66–1.25)
CREAT SERPL-MCNC: 1.92 MG/DL (ref 0.66–1.25)
CREAT SERPL-MCNC: 2.03 MG/DL (ref 0.66–1.25)
CREAT SERPL-MCNC: 2.2 MG/DL (ref 0.66–1.25)
CREAT SERPL-MCNC: 2.31 MG/DL (ref 0.66–1.25)
CREAT SERPL-MCNC: 2.34 MG/DL (ref 0.66–1.25)
CREAT SERPL-MCNC: 2.46 MG/DL (ref 0.66–1.25)
CREAT SERPL-MCNC: 2.6 MG/DL (ref 0.66–1.25)
CREAT SERPL-MCNC: 2.63 MG/DL (ref 0.66–1.25)
DIFFERENTIAL METHOD BLD: ABNORMAL
DIFFERENTIAL METHOD BLD: NORMAL
EC STX1 GENE STL QL NAA+PROBE: NOT DETECTED
EC STX2 GENE STL QL NAA+PROBE: NOT DETECTED
ENTERIC PATHOGEN COMMENT: NORMAL
EOSINOPHIL # BLD AUTO: 0 10E9/L (ref 0–0.7)
EOSINOPHIL # BLD AUTO: 0.2 10E9/L (ref 0–0.7)
EOSINOPHIL # BLD AUTO: 0.2 10E9/L (ref 0–0.7)
EOSINOPHIL NFR BLD AUTO: 0 %
EOSINOPHIL NFR BLD AUTO: 2.1 %
EOSINOPHIL NFR BLD AUTO: 3.1 %
ERYTHROCYTE [DISTWIDTH] IN BLOOD BY AUTOMATED COUNT: 13.9 % (ref 10–15)
ERYTHROCYTE [DISTWIDTH] IN BLOOD BY AUTOMATED COUNT: 14.2 % (ref 10–15)
ERYTHROCYTE [DISTWIDTH] IN BLOOD BY AUTOMATED COUNT: 14.2 % (ref 10–15)
ERYTHROCYTE [DISTWIDTH] IN BLOOD BY AUTOMATED COUNT: 14.3 % (ref 10–15)
ERYTHROCYTE [DISTWIDTH] IN BLOOD BY AUTOMATED COUNT: 14.3 % (ref 10–15)
ERYTHROCYTE [DISTWIDTH] IN BLOOD BY AUTOMATED COUNT: 14.5 % (ref 10–15)
ERYTHROCYTE [DISTWIDTH] IN BLOOD BY AUTOMATED COUNT: 14.6 % (ref 10–15)
ERYTHROCYTE [DISTWIDTH] IN BLOOD BY AUTOMATED COUNT: 14.6 % (ref 10–15)
ERYTHROCYTE [DISTWIDTH] IN BLOOD BY AUTOMATED COUNT: 14.7 % (ref 10–15)
ERYTHROCYTE [DISTWIDTH] IN BLOOD BY AUTOMATED COUNT: 15.5 % (ref 10–15)
FIBRINOGEN PPP-MCNC: 132 MG/DL (ref 200–420)
FIBRINOGEN PPP-MCNC: 207 MG/DL (ref 200–420)
FIBRINOGEN PPP-MCNC: 218 MG/DL (ref 200–420)
FIBRINOGEN PPP-MCNC: 265 MG/DL (ref 200–420)
FIBRINOGEN PPP-MCNC: 96 MG/DL (ref 200–420)
GAMMA GLOB SERPL ELPH-MCNC: 0.9 G/DL (ref 0.7–1.6)
GFR SERPL CREATININE-BSD FRML MDRD: 24 ML/MIN/1.7M2
GFR SERPL CREATININE-BSD FRML MDRD: 24 ML/MIN/1.7M2
GFR SERPL CREATININE-BSD FRML MDRD: 26 ML/MIN/1.7M2
GFR SERPL CREATININE-BSD FRML MDRD: 28 ML/MIN/1.7M2
GFR SERPL CREATININE-BSD FRML MDRD: 28 ML/MIN/1.7M2
GFR SERPL CREATININE-BSD FRML MDRD: 30 ML/MIN/1.7M2
GFR SERPL CREATININE-BSD FRML MDRD: 32 ML/MIN/1.7M2
GFR SERPL CREATININE-BSD FRML MDRD: 35 ML/MIN/1.7M2
GFR SERPL CREATININE-BSD FRML MDRD: 36 ML/MIN/1.7M2
GFR SERPL CREATININE-BSD FRML MDRD: 41 ML/MIN/1.7M2
GFR SERPL CREATININE-BSD FRML MDRD: 42 ML/MIN/1.7M2
GFR SERPL CREATININE-BSD FRML MDRD: 43 ML/MIN/1.7M2
GFR SERPL CREATININE-BSD FRML MDRD: 47 ML/MIN/1.7M2
GFR SERPL CREATININE-BSD FRML MDRD: 50 ML/MIN/1.7M2
GFR SERPL CREATININE-BSD FRML MDRD: 55 ML/MIN/1.7M2
GFR SERPL CREATININE-BSD FRML MDRD: 56 ML/MIN/1.7M2
GFR SERPL CREATININE-BSD FRML MDRD: 59 ML/MIN/1.7M2
GFR SERPL CREATININE-BSD FRML MDRD: 61 ML/MIN/1.7M2
GFR SERPL CREATININE-BSD FRML MDRD: 63 ML/MIN/1.7M2
GFR SERPL CREATININE-BSD FRML MDRD: 65 ML/MIN/1.7M2
GFR SERPL CREATININE-BSD FRML MDRD: 67 ML/MIN/1.7M2
GFR SERPL CREATININE-BSD FRML MDRD: 69 ML/MIN/1.7M2
GFR SERPL CREATININE-BSD FRML MDRD: 74 ML/MIN/1.7M2
GLUCOSE BLDC GLUCOMTR-MCNC: 108 MG/DL (ref 70–99)
GLUCOSE BLDC GLUCOMTR-MCNC: 117 MG/DL (ref 70–99)
GLUCOSE BLDC GLUCOMTR-MCNC: 118 MG/DL (ref 70–99)
GLUCOSE BLDC GLUCOMTR-MCNC: 124 MG/DL (ref 70–99)
GLUCOSE BLDC GLUCOMTR-MCNC: 125 MG/DL (ref 70–99)
GLUCOSE BLDC GLUCOMTR-MCNC: 131 MG/DL (ref 70–99)
GLUCOSE BLDC GLUCOMTR-MCNC: 134 MG/DL (ref 70–99)
GLUCOSE BLDC GLUCOMTR-MCNC: 135 MG/DL (ref 70–99)
GLUCOSE BLDC GLUCOMTR-MCNC: 138 MG/DL (ref 70–99)
GLUCOSE BLDC GLUCOMTR-MCNC: 149 MG/DL (ref 70–99)
GLUCOSE BLDC GLUCOMTR-MCNC: 150 MG/DL (ref 70–99)
GLUCOSE BLDC GLUCOMTR-MCNC: 150 MG/DL (ref 70–99)
GLUCOSE BLDC GLUCOMTR-MCNC: 156 MG/DL (ref 70–99)
GLUCOSE BLDC GLUCOMTR-MCNC: 160 MG/DL (ref 70–99)
GLUCOSE BLDC GLUCOMTR-MCNC: 162 MG/DL (ref 70–99)
GLUCOSE BLDC GLUCOMTR-MCNC: 165 MG/DL (ref 70–99)
GLUCOSE BLDC GLUCOMTR-MCNC: 177 MG/DL (ref 70–99)
GLUCOSE BLDC GLUCOMTR-MCNC: 187 MG/DL (ref 70–99)
GLUCOSE BLDC GLUCOMTR-MCNC: 194 MG/DL (ref 70–99)
GLUCOSE BLDC GLUCOMTR-MCNC: 210 MG/DL (ref 70–99)
GLUCOSE BLDC GLUCOMTR-MCNC: 239 MG/DL (ref 70–99)
GLUCOSE BLDC GLUCOMTR-MCNC: 262 MG/DL (ref 70–99)
GLUCOSE BLDC GLUCOMTR-MCNC: 307 MG/DL (ref 70–99)
GLUCOSE BLDC GLUCOMTR-MCNC: 384 MG/DL (ref 70–99)
GLUCOSE BLDC GLUCOMTR-MCNC: 60 MG/DL (ref 70–99)
GLUCOSE BLDC GLUCOMTR-MCNC: 60 MG/DL (ref 70–99)
GLUCOSE BLDC GLUCOMTR-MCNC: 64 MG/DL (ref 70–99)
GLUCOSE BLDC GLUCOMTR-MCNC: 65 MG/DL (ref 70–99)
GLUCOSE BLDC GLUCOMTR-MCNC: 75 MG/DL (ref 70–99)
GLUCOSE BLDC GLUCOMTR-MCNC: 76 MG/DL (ref 70–99)
GLUCOSE BLDC GLUCOMTR-MCNC: 79 MG/DL (ref 70–99)
GLUCOSE BLDC GLUCOMTR-MCNC: 80 MG/DL (ref 70–99)
GLUCOSE BLDC GLUCOMTR-MCNC: 80 MG/DL (ref 70–99)
GLUCOSE BLDC GLUCOMTR-MCNC: 81 MG/DL (ref 70–99)
GLUCOSE BLDC GLUCOMTR-MCNC: 83 MG/DL (ref 70–99)
GLUCOSE BLDC GLUCOMTR-MCNC: 83 MG/DL (ref 70–99)
GLUCOSE BLDC GLUCOMTR-MCNC: 87 MG/DL (ref 70–99)
GLUCOSE BLDC GLUCOMTR-MCNC: 88 MG/DL (ref 70–99)
GLUCOSE BLDC GLUCOMTR-MCNC: 91 MG/DL (ref 70–99)
GLUCOSE BLDC GLUCOMTR-MCNC: 91 MG/DL (ref 70–99)
GLUCOSE BLDC GLUCOMTR-MCNC: 94 MG/DL (ref 70–99)
GLUCOSE SERPL-MCNC: 108 MG/DL (ref 70–99)
GLUCOSE SERPL-MCNC: 112 MG/DL (ref 70–99)
GLUCOSE SERPL-MCNC: 114 MG/DL (ref 70–99)
GLUCOSE SERPL-MCNC: 114 MG/DL (ref 70–99)
GLUCOSE SERPL-MCNC: 118 MG/DL (ref 70–99)
GLUCOSE SERPL-MCNC: 118 MG/DL (ref 70–99)
GLUCOSE SERPL-MCNC: 119 MG/DL (ref 70–99)
GLUCOSE SERPL-MCNC: 128 MG/DL (ref 70–99)
GLUCOSE SERPL-MCNC: 129 MG/DL (ref 70–99)
GLUCOSE SERPL-MCNC: 135 MG/DL (ref 70–99)
GLUCOSE SERPL-MCNC: 142 MG/DL (ref 70–99)
GLUCOSE SERPL-MCNC: 148 MG/DL (ref 70–99)
GLUCOSE SERPL-MCNC: 156 MG/DL (ref 70–99)
GLUCOSE SERPL-MCNC: 174 MG/DL (ref 70–99)
GLUCOSE SERPL-MCNC: 190 MG/DL (ref 70–99)
GLUCOSE SERPL-MCNC: 200 MG/DL (ref 70–99)
GLUCOSE SERPL-MCNC: 200 MG/DL (ref 70–99)
GLUCOSE SERPL-MCNC: 203 MG/DL (ref 70–99)
GLUCOSE SERPL-MCNC: 276 MG/DL (ref 70–99)
GLUCOSE SERPL-MCNC: 292 MG/DL (ref 70–99)
GLUCOSE SERPL-MCNC: 360 MG/DL (ref 70–99)
GLUCOSE SERPL-MCNC: 437 MG/DL (ref 70–99)
GLUCOSE SERPL-MCNC: 53 MG/DL (ref 70–99)
GLUCOSE SERPL-MCNC: 63 MG/DL (ref 70–99)
GLUCOSE SERPL-MCNC: 64 MG/DL (ref 70–99)
GLUCOSE SERPL-MCNC: 64 MG/DL (ref 70–99)
GLUCOSE SERPL-MCNC: 72 MG/DL (ref 70–99)
GLUCOSE UR STRIP-MCNC: NEGATIVE MG/DL
HBA1C MFR BLD: 6.2 % (ref 4.3–6)
HBA1C MFR BLD: 6.4 % (ref 4.3–6)
HBV SURFACE AB SERPL IA-ACNC: 10.72 M[IU]/ML
HBV SURFACE AG SERPL QL IA: NONREACTIVE
HCO3 BLD-SCNC: 12 MMOL/L (ref 21–28)
HCO3 BLD-SCNC: 17 MMOL/L (ref 21–28)
HCO3 BLD-SCNC: 18 MMOL/L (ref 21–28)
HCO3 BLD-SCNC: 19 MMOL/L (ref 21–28)
HCO3 BLD-SCNC: 21 MMOL/L (ref 21–28)
HCO3 BLD-SCNC: 25 MMOL/L (ref 21–28)
HCO3 BLD-SCNC: 34 MMOL/L (ref 21–28)
HCO3 BLDV-SCNC: 20 MMOL/L (ref 21–28)
HCO3 BLDV-SCNC: 22 MMOL/L (ref 21–28)
HCO3 BLDV-SCNC: 22 MMOL/L (ref 21–28)
HCO3 BLDV-SCNC: 31 MMOL/L (ref 21–28)
HCO3 BLDV-SCNC: 32 MMOL/L (ref 21–28)
HCT VFR BLD AUTO: 30.2 % (ref 40–53)
HCT VFR BLD AUTO: 32.5 % (ref 40–53)
HCT VFR BLD AUTO: 32.6 % (ref 40–53)
HCT VFR BLD AUTO: 34.1 % (ref 40–53)
HCT VFR BLD AUTO: 34.7 % (ref 40–53)
HCT VFR BLD AUTO: 35.6 % (ref 40–53)
HCT VFR BLD AUTO: 35.9 % (ref 40–53)
HCT VFR BLD AUTO: 36.3 % (ref 40–53)
HCT VFR BLD AUTO: 37.7 % (ref 40–53)
HCT VFR BLD AUTO: 37.8 % (ref 40–53)
HCT VFR BLD AUTO: 38.2 % (ref 40–53)
HCT VFR BLD AUTO: 38.4 % (ref 40–53)
HCT VFR BLD AUTO: 39.6 % (ref 40–53)
HCT VFR BLD AUTO: 41.2 % (ref 40–53)
HCT VFR BLD AUTO: 44.8 % (ref 40–53)
HCV AB SERPL QL IA: NONREACTIVE
HGB BLD-MCNC: 10.6 G/DL (ref 13.3–17.7)
HGB BLD-MCNC: 10.6 G/DL (ref 13.3–17.7)
HGB BLD-MCNC: 11.1 G/DL (ref 13.3–17.7)
HGB BLD-MCNC: 11.3 G/DL (ref 13.3–17.7)
HGB BLD-MCNC: 11.6 G/DL (ref 13.3–17.7)
HGB BLD-MCNC: 11.7 G/DL (ref 13.3–17.7)
HGB BLD-MCNC: 11.7 G/DL (ref 13.3–17.7)
HGB BLD-MCNC: 11.8 G/DL (ref 13.3–17.7)
HGB BLD-MCNC: 11.8 G/DL (ref 13.3–17.7)
HGB BLD-MCNC: 12.1 G/DL (ref 13.3–17.7)
HGB BLD-MCNC: 12.3 G/DL (ref 13.3–17.7)
HGB BLD-MCNC: 12.4 G/DL (ref 13.3–17.7)
HGB BLD-MCNC: 12.7 G/DL (ref 13.3–17.7)
HGB BLD-MCNC: 12.9 G/DL (ref 13.3–17.7)
HGB BLD-MCNC: 14.2 G/DL (ref 13.3–17.7)
HGB BLD-MCNC: 14.5 G/DL (ref 13.3–17.7)
HGB BLD-MCNC: 9.8 G/DL (ref 13.3–17.7)
HGB UR QL STRIP: NEGATIVE
HSV1 IGG SERPL QL IA: >8 AI (ref 0–0.8)
HSV2 IGG SERPL QL IA: <0.2 AI (ref 0–0.8)
HYALINE CASTS #/AREA URNS LPF: 1 /LPF (ref 0–2)
IMM GRANULOCYTES # BLD: 0 10E9/L (ref 0–0.4)
IMM GRANULOCYTES # BLD: 0.1 10E9/L (ref 0–0.4)
IMM GRANULOCYTES NFR BLD: 0.2 %
IMM GRANULOCYTES NFR BLD: 0.3 %
IMM GRANULOCYTES NFR BLD: 0.4 %
IMM GRANULOCYTES NFR BLD: 0.7 %
IMM GRANULOCYTES NFR BLD: 0.9 %
IMM GRANULOCYTES NFR BLD: 0.9 %
IMM GRANULOCYTES NFR BLD: 1.2 %
INR PPP: 1.06 (ref 0.86–1.14)
INR PPP: 1.11 (ref 0.86–1.14)
INR PPP: 1.18 (ref 0.86–1.14)
INR PPP: 1.21 (ref 0.86–1.14)
INR PPP: 1.21 (ref 0.86–1.14)
INR PPP: 3.01 (ref 0.86–1.14)
INR PPP: 4.84 (ref 0.86–1.14)
INTERPRETATION ECG - MUSE: NORMAL
KETONES UR STRIP-MCNC: NEGATIVE MG/DL
LACTATE BLD-SCNC: 10.2 MMOL/L (ref 0.7–2)
LACTATE BLD-SCNC: 11.9 MMOL/L (ref 0.7–2)
LACTATE BLD-SCNC: 13.8 MMOL/L (ref 0.7–2)
LACTATE BLD-SCNC: 4.8 MMOL/L (ref 0.7–2)
LACTATE BLD-SCNC: 5.9 MMOL/L (ref 0.7–2)
LACTATE BLD-SCNC: 7.9 MMOL/L (ref 0.7–2)
LACTATE BLD-SCNC: 8 MMOL/L (ref 0.7–2)
LACTATE BLD-SCNC: 8.4 MMOL/L (ref 0.7–2)
LACTATE BLD-SCNC: 9.2 MMOL/L (ref 0.7–2)
LACTATE BLD-SCNC: 9.2 MMOL/L (ref 0.7–2)
LACTATE BLD-SCNC: 9.5 MMOL/L (ref 0.7–2)
LACTATE BLD-SCNC: 9.6 MMOL/L (ref 0.7–2)
LACTATE BLD-SCNC: 9.7 MMOL/L (ref 0.7–2)
LACTATE SERPL-SCNC: 11 MMOL/L (ref 0.4–2)
LDH SERPL L TO P-CCNC: 2083 U/L (ref 85–227)
LDH SERPL L TO P-CCNC: 2222 U/L (ref 85–227)
LDH SERPL L TO P-CCNC: 2236 U/L (ref 85–227)
LDH SERPL L TO P-CCNC: 2401 U/L (ref 85–227)
LDH SERPL L TO P-CCNC: 2403 U/L (ref 85–227)
LDH SERPL L TO P-CCNC: 2431 U/L (ref 85–227)
LDH SERPL L TO P-CCNC: 2435 U/L (ref 85–227)
LDH SERPL L TO P-CCNC: 2441 U/L (ref 85–227)
LDH SERPL L TO P-CCNC: 2452 U/L (ref 85–227)
LDH SERPL L TO P-CCNC: 2493 U/L (ref 85–227)
LDH SERPL L TO P-CCNC: 2562 U/L (ref 85–227)
LDH SERPL L TO P-CCNC: 2588 U/L (ref 85–227)
LDH SERPL L TO P-CCNC: 2640 U/L (ref 85–227)
LDH SERPL L TO P-CCNC: 2728 U/L (ref 85–227)
LDH SERPL L TO P-CCNC: 3169 U/L (ref 85–227)
LDH SERPL L TO P-CCNC: 3803 U/L (ref 85–227)
LDH SERPL L TO P-CCNC: 4088 U/L (ref 85–227)
LDH SERPL L TO P-CCNC: 4299 U/L (ref 85–227)
LDH SERPL L TO P-CCNC: 4900 U/L (ref 85–227)
LDH SERPL L TO P-CCNC: 5106 U/L (ref 85–227)
LDH SERPL L TO P-CCNC: 5858 U/L (ref 85–227)
LDH SERPL L TO P-CCNC: 7142 U/L (ref 85–227)
LDH SERPL L TO P-CCNC: ABNORMAL U/L (ref 85–227)
LEUKOCYTE ESTERASE UR QL STRIP: NEGATIVE
LIPASE SERPL-CCNC: 6044 U/L (ref 73–393)
LYMPHOCYTES # BLD AUTO: 0.2 10E9/L (ref 0.8–5.3)
LYMPHOCYTES # BLD AUTO: 0.3 10E9/L (ref 0.8–5.3)
LYMPHOCYTES # BLD AUTO: 0.6 10E9/L (ref 0.8–5.3)
LYMPHOCYTES # BLD AUTO: 0.7 10E9/L (ref 0.8–5.3)
LYMPHOCYTES # BLD AUTO: 0.7 10E9/L (ref 0.8–5.3)
LYMPHOCYTES # BLD AUTO: 0.9 10E9/L (ref 0.8–5.3)
LYMPHOCYTES # BLD AUTO: 1 10E9/L (ref 0.8–5.3)
LYMPHOCYTES # BLD AUTO: 1 10E9/L (ref 0.8–5.3)
LYMPHOCYTES NFR BLD AUTO: 11.4 %
LYMPHOCYTES NFR BLD AUTO: 12.7 %
LYMPHOCYTES NFR BLD AUTO: 14.4 %
LYMPHOCYTES NFR BLD AUTO: 15.6 %
LYMPHOCYTES NFR BLD AUTO: 17.1 %
LYMPHOCYTES NFR BLD AUTO: 3 %
LYMPHOCYTES NFR BLD AUTO: 4.2 %
LYMPHOCYTES NFR BLD AUTO: 4.4 %
LYMPHOCYTES NFR BLD AUTO: 4.8 %
LYMPHOCYTES NFR BLD AUTO: 8.3 %
Lab: NORMAL
M PROTEIN SERPL ELPH-MCNC: 0 G/DL
MAGNESIUM SERPL-MCNC: 1.5 MG/DL (ref 1.6–2.3)
MAGNESIUM SERPL-MCNC: 1.6 MG/DL (ref 1.6–2.3)
MAGNESIUM SERPL-MCNC: 1.7 MG/DL (ref 1.6–2.3)
MAGNESIUM SERPL-MCNC: 1.8 MG/DL (ref 1.6–2.3)
MAGNESIUM SERPL-MCNC: 2 MG/DL (ref 1.6–2.3)
MAGNESIUM SERPL-MCNC: 2.1 MG/DL (ref 1.6–2.3)
MAGNESIUM SERPL-MCNC: 2.1 MG/DL (ref 1.6–2.3)
MAGNESIUM SERPL-MCNC: 2.3 MG/DL (ref 1.6–2.3)
MAGNESIUM SERPL-MCNC: 2.4 MG/DL (ref 1.6–2.3)
MAGNESIUM SERPL-MCNC: 2.7 MG/DL (ref 1.6–2.3)
MCH RBC QN AUTO: 30.3 PG (ref 26.5–33)
MCH RBC QN AUTO: 30.3 PG (ref 26.5–33)
MCH RBC QN AUTO: 30.4 PG (ref 26.5–33)
MCH RBC QN AUTO: 30.5 PG (ref 26.5–33)
MCH RBC QN AUTO: 30.5 PG (ref 26.5–33)
MCH RBC QN AUTO: 30.7 PG (ref 26.5–33)
MCH RBC QN AUTO: 30.8 PG (ref 26.5–33)
MCH RBC QN AUTO: 30.8 PG (ref 26.5–33)
MCH RBC QN AUTO: 30.9 PG (ref 26.5–33)
MCH RBC QN AUTO: 31 PG (ref 26.5–33)
MCH RBC QN AUTO: 31.5 PG (ref 26.5–33)
MCH RBC QN AUTO: 31.5 PG (ref 26.5–33)
MCH RBC QN AUTO: 31.8 PG (ref 26.5–33)
MCHC RBC AUTO-ENTMCNC: 32 G/DL (ref 31.5–36.5)
MCHC RBC AUTO-ENTMCNC: 32 G/DL (ref 31.5–36.5)
MCHC RBC AUTO-ENTMCNC: 32.4 G/DL (ref 31.5–36.5)
MCHC RBC AUTO-ENTMCNC: 32.5 G/DL (ref 31.5–36.5)
MCHC RBC AUTO-ENTMCNC: 32.6 G/DL (ref 31.5–36.5)
MCHC RBC AUTO-ENTMCNC: 33.1 G/DL (ref 31.5–36.5)
MCHC RBC AUTO-ENTMCNC: 33.1 G/DL (ref 31.5–36.5)
MCHC RBC AUTO-ENTMCNC: 34 G/DL (ref 31.5–36.5)
MCHC RBC AUTO-ENTMCNC: 34.5 G/DL (ref 31.5–36.5)
MCV RBC AUTO: 91 FL (ref 78–100)
MCV RBC AUTO: 93 FL (ref 78–100)
MCV RBC AUTO: 94 FL (ref 78–100)
MCV RBC AUTO: 95 FL (ref 78–100)
MCV RBC AUTO: 97 FL (ref 78–100)
MONOCYTES # BLD AUTO: 0.1 10E9/L (ref 0–1.3)
MONOCYTES # BLD AUTO: 0.2 10E9/L (ref 0–1.3)
MONOCYTES # BLD AUTO: 0.2 10E9/L (ref 0–1.3)
MONOCYTES # BLD AUTO: 0.3 10E9/L (ref 0–1.3)
MONOCYTES # BLD AUTO: 0.6 10E9/L (ref 0–1.3)
MONOCYTES # BLD AUTO: 0.7 10E9/L (ref 0–1.3)
MONOCYTES # BLD AUTO: 1 10E9/L (ref 0–1.3)
MONOCYTES # BLD AUTO: 1.1 10E9/L (ref 0–1.3)
MONOCYTES NFR BLD AUTO: 1.7 %
MONOCYTES NFR BLD AUTO: 14 %
MONOCYTES NFR BLD AUTO: 19.4 %
MONOCYTES NFR BLD AUTO: 2.5 %
MONOCYTES NFR BLD AUTO: 3.4 %
MONOCYTES NFR BLD AUTO: 4.4 %
MONOCYTES NFR BLD AUTO: 5.5 %
MONOCYTES NFR BLD AUTO: 6 %
MONOCYTES NFR BLD AUTO: 7.3 %
MONOCYTES NFR BLD AUTO: 9.2 %
MUCOUS THREADS #/AREA URNS LPF: PRESENT /LPF
MYELOCYTES # BLD: 0.1 10E9/L
MYELOCYTES NFR BLD MANUAL: 0.9 %
NEUTROPHILS # BLD AUTO: 2.5 10E9/L (ref 1.6–8.3)
NEUTROPHILS # BLD AUTO: 3.4 10E9/L (ref 1.6–8.3)
NEUTROPHILS # BLD AUTO: 4.8 10E9/L (ref 1.6–8.3)
NEUTROPHILS # BLD AUTO: 4.8 10E9/L (ref 1.6–8.3)
NEUTROPHILS # BLD AUTO: 5.3 10E9/L (ref 1.6–8.3)
NEUTROPHILS # BLD AUTO: 5.9 10E9/L (ref 1.6–8.3)
NEUTROPHILS # BLD AUTO: 6.3 10E9/L (ref 1.6–8.3)
NEUTROPHILS # BLD AUTO: 6.4 10E9/L (ref 1.6–8.3)
NEUTROPHILS # BLD AUTO: 7.2 10E9/L (ref 1.6–8.3)
NEUTROPHILS # BLD AUTO: 7.4 10E9/L (ref 1.6–8.3)
NEUTROPHILS NFR BLD AUTO: 61.3 %
NEUTROPHILS NFR BLD AUTO: 67.8 %
NEUTROPHILS NFR BLD AUTO: 76 %
NEUTROPHILS NFR BLD AUTO: 79.5 %
NEUTROPHILS NFR BLD AUTO: 81.6 %
NEUTROPHILS NFR BLD AUTO: 82.2 %
NEUTROPHILS NFR BLD AUTO: 90.3 %
NEUTROPHILS NFR BLD AUTO: 91.7 %
NEUTROPHILS NFR BLD AUTO: 92.3 %
NEUTROPHILS NFR BLD AUTO: 94.2 %
NITRATE UR QL: NEGATIVE
NOROV GI+II ORF1-ORF2 JNC STL QL NAA+PR: NOT DETECTED
NRBC # BLD AUTO: 0 10*3/UL
NRBC BLD AUTO-RTO: 0 /100
NT-PROBNP SERPL-MCNC: ABNORMAL PG/ML (ref 0–900)
NUM BPU REQUESTED: 1
O2/TOTAL GAS SETTING VFR VENT: 100 %
O2/TOTAL GAS SETTING VFR VENT: 21 %
OXYHGB MFR BLDV: 29 %
OXYHGB MFR BLDV: 45 %
OXYHGB MFR BLDV: 50 %
PCO2 BLD: 33 MM HG (ref 35–45)
PCO2 BLD: 33 MM HG (ref 35–45)
PCO2 BLD: 34 MM HG (ref 35–45)
PCO2 BLD: 36 MM HG (ref 35–45)
PCO2 BLD: 36 MM HG (ref 35–45)
PCO2 BLD: 38 MM HG (ref 35–45)
PCO2 BLD: 39 MM HG (ref 35–45)
PCO2 BLD: 42 MM HG (ref 35–45)
PCO2 BLD: 49 MM HG (ref 35–45)
PCO2 BLDV: 42 MM HG (ref 40–50)
PCO2 BLDV: 49 MM HG (ref 40–50)
PCO2 BLDV: 50 MM HG (ref 40–50)
PCO2 BLDV: 50 MM HG (ref 40–50)
PCO2 BLDV: 56 MM HG (ref 40–50)
PH BLD: 7.18 PH (ref 7.35–7.45)
PH BLD: 7.3 PH (ref 7.35–7.45)
PH BLD: 7.31 PH (ref 7.35–7.45)
PH BLD: 7.31 PH (ref 7.35–7.45)
PH BLD: 7.32 PH (ref 7.35–7.45)
PH BLD: 7.33 PH (ref 7.35–7.45)
PH BLD: 7.34 PH (ref 7.35–7.45)
PH BLD: 7.43 PH (ref 7.35–7.45)
PH BLD: 7.45 PH (ref 7.35–7.45)
PH BLDV: 7.25 PH (ref 7.32–7.43)
PH BLDV: 7.27 PH (ref 7.32–7.43)
PH BLDV: 7.27 PH (ref 7.32–7.43)
PH BLDV: 7.36 PH (ref 7.32–7.43)
PH BLDV: 7.4 PH (ref 7.32–7.43)
PH UR STRIP: 5 PH (ref 5–7)
PHOSPHATE SERPL-MCNC: 11.7 MG/DL (ref 2.5–4.5)
PHOSPHATE SERPL-MCNC: 2.9 MG/DL (ref 2.5–4.5)
PHOSPHATE SERPL-MCNC: 3.1 MG/DL (ref 2.5–4.5)
PHOSPHATE SERPL-MCNC: 3.1 MG/DL (ref 2.5–4.5)
PHOSPHATE SERPL-MCNC: 3.2 MG/DL (ref 2.5–4.5)
PHOSPHATE SERPL-MCNC: 3.4 MG/DL (ref 2.5–4.5)
PHOSPHATE SERPL-MCNC: 3.5 MG/DL (ref 2.5–4.5)
PHOSPHATE SERPL-MCNC: 3.6 MG/DL (ref 2.5–4.5)
PHOSPHATE SERPL-MCNC: 3.8 MG/DL (ref 2.5–4.5)
PHOSPHATE SERPL-MCNC: 4.3 MG/DL (ref 2.5–4.5)
PHOSPHATE SERPL-MCNC: 4.4 MG/DL (ref 2.5–4.5)
PHOSPHATE SERPL-MCNC: 4.5 MG/DL (ref 2.5–4.5)
PHOSPHATE SERPL-MCNC: 4.8 MG/DL (ref 2.5–4.5)
PHOSPHATE SERPL-MCNC: 5.1 MG/DL (ref 2.5–4.5)
PHOSPHATE SERPL-MCNC: 5.3 MG/DL (ref 2.5–4.5)
PHOSPHATE SERPL-MCNC: 5.6 MG/DL (ref 2.5–4.5)
PHOSPHATE SERPL-MCNC: 5.6 MG/DL (ref 2.5–4.5)
PHOSPHATE SERPL-MCNC: 5.8 MG/DL (ref 2.5–4.5)
PHOSPHATE SERPL-MCNC: 6.1 MG/DL (ref 2.5–4.5)
PHOSPHATE SERPL-MCNC: 8.8 MG/DL (ref 2.5–4.5)
PHOSPHATE SERPL-MCNC: 9.1 MG/DL (ref 2.5–4.5)
PHOSPHATE SERPL-MCNC: 9.8 MG/DL (ref 2.5–4.5)
PLATELET # BLD AUTO: 100 10E9/L (ref 150–450)
PLATELET # BLD AUTO: 102 10E9/L (ref 150–450)
PLATELET # BLD AUTO: 110 10E9/L (ref 150–450)
PLATELET # BLD AUTO: 116 10E9/L (ref 150–450)
PLATELET # BLD AUTO: 132 10E9/L (ref 150–450)
PLATELET # BLD AUTO: 137 10E9/L (ref 150–450)
PLATELET # BLD AUTO: 138 10E9/L (ref 150–450)
PLATELET # BLD AUTO: 146 10E9/L (ref 150–450)
PLATELET # BLD AUTO: 152 10E9/L (ref 150–450)
PLATELET # BLD AUTO: 154 10E9/L (ref 150–450)
PLATELET # BLD AUTO: 180 10E9/L (ref 150–450)
PLATELET # BLD AUTO: 45 10E9/L (ref 150–450)
PLATELET # BLD AUTO: 55 10E9/L (ref 150–450)
PLATELET # BLD AUTO: 71 10E9/L (ref 150–450)
PLATELET # BLD AUTO: 90 10E9/L (ref 150–450)
PLATELET # BLD EST: ABNORMAL 10*3/UL
PO2 BLD: 59 MM HG (ref 80–105)
PO2 BLD: 62 MM HG (ref 80–105)
PO2 BLD: 62 MM HG (ref 80–105)
PO2 BLD: 64 MM HG (ref 80–105)
PO2 BLD: 71 MM HG (ref 80–105)
PO2 BLD: 74 MM HG (ref 80–105)
PO2 BLD: 76 MM HG (ref 80–105)
PO2 BLD: 76 MM HG (ref 80–105)
PO2 BLD: 84 MM HG (ref 80–105)
PO2 BLDV: 24 MM HG (ref 25–47)
PO2 BLDV: 32 MM HG (ref 25–47)
PO2 BLDV: 32 MM HG (ref 25–47)
PO2 BLDV: 33 MM HG (ref 25–47)
PO2 BLDV: 36 MM HG (ref 25–47)
POTASSIUM SERPL-SCNC: 3.1 MMOL/L (ref 3.4–5.3)
POTASSIUM SERPL-SCNC: 3.3 MMOL/L (ref 3.4–5.3)
POTASSIUM SERPL-SCNC: 3.7 MMOL/L (ref 3.4–5.3)
POTASSIUM SERPL-SCNC: 3.7 MMOL/L (ref 3.4–5.3)
POTASSIUM SERPL-SCNC: 3.9 MMOL/L (ref 3.4–5.3)
POTASSIUM SERPL-SCNC: 4 MMOL/L (ref 3.4–5.3)
POTASSIUM SERPL-SCNC: 4.1 MMOL/L (ref 3.4–5.3)
POTASSIUM SERPL-SCNC: 4.2 MMOL/L (ref 3.4–5.3)
POTASSIUM SERPL-SCNC: 4.3 MMOL/L (ref 3.4–5.3)
POTASSIUM SERPL-SCNC: 4.4 MMOL/L (ref 3.4–5.3)
POTASSIUM SERPL-SCNC: 4.5 MMOL/L (ref 3.4–5.3)
POTASSIUM SERPL-SCNC: 4.6 MMOL/L (ref 3.4–5.3)
POTASSIUM SERPL-SCNC: 4.6 MMOL/L (ref 3.4–5.3)
POTASSIUM SERPL-SCNC: 4.8 MMOL/L (ref 3.4–5.3)
POTASSIUM SERPL-SCNC: 5 MMOL/L (ref 3.4–5.3)
POTASSIUM SERPL-SCNC: 5.2 MMOL/L (ref 3.4–5.3)
POTASSIUM SERPL-SCNC: 5.4 MMOL/L (ref 3.4–5.3)
PREALB SERPL IA-MCNC: 11 MG/DL (ref 15–45)
PROT PATTERN SERPL ELPH-IMP: ABNORMAL
PROT SERPL-MCNC: 4.6 G/DL (ref 6.8–8.8)
PROT SERPL-MCNC: 4.6 G/DL (ref 6.8–8.8)
PROT SERPL-MCNC: 4.8 G/DL (ref 6.8–8.8)
PROT SERPL-MCNC: 5.2 G/DL (ref 6.8–8.8)
PROT SERPL-MCNC: 5.4 G/DL (ref 6.8–8.8)
PROT SERPL-MCNC: 5.5 G/DL (ref 6.8–8.8)
PROT SERPL-MCNC: 5.6 G/DL (ref 6.8–8.8)
PROT SERPL-MCNC: 6.8 G/DL (ref 6.8–8.8)
RBC # BLD AUTO: 3.11 10E12/L (ref 4.4–5.9)
RBC # BLD AUTO: 3.45 10E12/L (ref 4.4–5.9)
RBC # BLD AUTO: 3.49 10E12/L (ref 4.4–5.9)
RBC # BLD AUTO: 3.64 10E12/L (ref 4.4–5.9)
RBC # BLD AUTO: 3.71 10E12/L (ref 4.4–5.9)
RBC # BLD AUTO: 3.77 10E12/L (ref 4.4–5.9)
RBC # BLD AUTO: 3.83 10E12/L (ref 4.4–5.9)
RBC # BLD AUTO: 3.83 10E12/L (ref 4.4–5.9)
RBC # BLD AUTO: 3.97 10E12/L (ref 4.4–5.9)
RBC # BLD AUTO: 3.99 10E12/L (ref 4.4–5.9)
RBC # BLD AUTO: 4.04 10E12/L (ref 4.4–5.9)
RBC # BLD AUTO: 4.14 10E12/L (ref 4.4–5.9)
RBC # BLD AUTO: 4.17 10E12/L (ref 4.4–5.9)
RBC # BLD AUTO: 4.51 10E12/L (ref 4.4–5.9)
RBC # BLD AUTO: 4.79 10E12/L (ref 4.4–5.9)
RBC #/AREA URNS AUTO: <1 /HPF (ref 0–2)
RBC MORPH BLD: NORMAL
RVA NSP5 STL QL NAA+PROBE: NOT DETECTED
SALMONELLA SP RPOD STL QL NAA+PROBE: NOT DETECTED
SHIGELLA SP+EIEC IPAH STL QL NAA+PROBE: NOT DETECTED
SODIUM SERPL-SCNC: 132 MMOL/L (ref 133–144)
SODIUM SERPL-SCNC: 133 MMOL/L (ref 133–144)
SODIUM SERPL-SCNC: 134 MMOL/L (ref 133–144)
SODIUM SERPL-SCNC: 134 MMOL/L (ref 133–144)
SODIUM SERPL-SCNC: 135 MMOL/L (ref 133–144)
SODIUM SERPL-SCNC: 136 MMOL/L (ref 133–144)
SODIUM SERPL-SCNC: 137 MMOL/L (ref 133–144)
SODIUM SERPL-SCNC: 138 MMOL/L (ref 133–144)
SODIUM SERPL-SCNC: 140 MMOL/L (ref 133–144)
SODIUM SERPL-SCNC: 141 MMOL/L (ref 133–144)
SODIUM SERPL-SCNC: 141 MMOL/L (ref 133–144)
SODIUM SERPL-SCNC: 142 MMOL/L (ref 133–144)
SODIUM SERPL-SCNC: 143 MMOL/L (ref 133–144)
SOURCE: ABNORMAL
SP GR UR STRIP: 1.01 (ref 1–1.03)
SPECIMEN EXP DATE BLD: NORMAL
SPECIMEN SOURCE: NORMAL
SPECIMEN SOURCE: NORMAL
TRANS CELLS #/AREA URNS HPF: <1 /HPF (ref 0–1)
TRANSFUSION STATUS PATIENT QL: NORMAL
TRIGL SERPL-MCNC: 194 MG/DL
TRIGL SERPL-MCNC: 241 MG/DL
TROPONIN I SERPL-MCNC: 0.12 UG/L (ref 0–0.04)
TROPONIN I SERPL-MCNC: 0.31 UG/L (ref 0–0.04)
TROPONIN I SERPL-MCNC: 0.36 UG/L (ref 0–0.04)
TROPONIN I SERPL-MCNC: 0.72 UG/L (ref 0–0.04)
URATE SERPL-MCNC: 0.2 MG/DL (ref 3.5–7.2)
URATE SERPL-MCNC: 0.3 MG/DL (ref 3.5–7.2)
URATE SERPL-MCNC: 0.3 MG/DL (ref 3.5–7.2)
URATE SERPL-MCNC: 0.4 MG/DL (ref 3.5–7.2)
URATE SERPL-MCNC: 0.6 MG/DL (ref 3.5–7.2)
URATE SERPL-MCNC: 1 MG/DL (ref 3.5–7.2)
URATE SERPL-MCNC: 1.1 MG/DL (ref 3.5–7.2)
URATE SERPL-MCNC: 14.7 MG/DL (ref 3.5–7.2)
URATE SERPL-MCNC: 2 MG/DL (ref 3.5–7.2)
URATE SERPL-MCNC: 2.1 MG/DL (ref 3.5–7.2)
URATE SERPL-MCNC: 2.9 MG/DL (ref 3.5–7.2)
URATE SERPL-MCNC: 3 MG/DL (ref 3.5–7.2)
URATE SERPL-MCNC: 3.3 MG/DL (ref 3.5–7.2)
URATE SERPL-MCNC: 3.8 MG/DL (ref 3.5–7.2)
URATE SERPL-MCNC: 4 MG/DL (ref 3.5–7.2)
URATE SERPL-MCNC: 4 MG/DL (ref 3.5–7.2)
URATE SERPL-MCNC: 4.2 MG/DL (ref 3.5–7.2)
URATE SERPL-MCNC: 4.8 MG/DL (ref 3.5–7.2)
URATE SERPL-MCNC: <0.2 MG/DL (ref 3.5–7.2)
UROBILINOGEN UR STRIP-MCNC: NORMAL MG/DL (ref 0–2)
V CHOL+PARA RFBL+TRKH+TNAA STL QL NAA+PR: NOT DETECTED
WBC # BLD AUTO: 0.2 10E9/L (ref 4–11)
WBC # BLD AUTO: 0.3 10E9/L (ref 4–11)
WBC # BLD AUTO: 0.4 10E9/L (ref 4–11)
WBC # BLD AUTO: 0.6 10E9/L (ref 4–11)
WBC # BLD AUTO: 10.8 10E9/L (ref 4–11)
WBC # BLD AUTO: 3.3 10E9/L (ref 4–11)
WBC # BLD AUTO: 5.6 10E9/L (ref 4–11)
WBC # BLD AUTO: 5.8 10E9/L (ref 4–11)
WBC # BLD AUTO: 5.9 10E9/L (ref 4–11)
WBC # BLD AUTO: 6.4 10E9/L (ref 4–11)
WBC # BLD AUTO: 7.1 10E9/L (ref 4–11)
WBC # BLD AUTO: 7.6 10E9/L (ref 4–11)
WBC # BLD AUTO: 7.8 10E9/L (ref 4–11)
WBC # BLD AUTO: 7.9 10E9/L (ref 4–11)
WBC # BLD AUTO: 8 10E9/L (ref 4–11)
WBC #/AREA URNS AUTO: 2 /HPF (ref 0–2)
Y ENTERO RECN STL QL NAA+PROBE: NOT DETECTED

## 2018-01-01 PROCEDURE — 00000146 ZZHCL STATISTIC GLUCOSE BY METER IP

## 2018-01-01 PROCEDURE — 25000128 H RX IP 250 OP 636: Performed by: INTERNAL MEDICINE

## 2018-01-01 PROCEDURE — A9270 NON-COVERED ITEM OR SERVICE: HCPCS | Mod: GY | Performed by: NURSE PRACTITIONER

## 2018-01-01 PROCEDURE — 40001004 ZZHCL STATISTIC FLOW INT 9-15 ABY TC 88188: Performed by: COLON & RECTAL SURGERY

## 2018-01-01 PROCEDURE — 84550 ASSAY OF BLOOD/URIC ACID: CPT | Performed by: PHYSICIAN ASSISTANT

## 2018-01-01 PROCEDURE — 25000132 ZZH RX MED GY IP 250 OP 250 PS 637: Mod: GY | Performed by: PHYSICIAN ASSISTANT

## 2018-01-01 PROCEDURE — 3E04305 INTRODUCTION OF OTHER ANTINEOPLASTIC INTO CENTRAL VEIN, PERCUTANEOUS APPROACH: ICD-10-PCS | Performed by: INTERNAL MEDICINE

## 2018-01-01 PROCEDURE — 94645 CONT INHLJ TX EACH ADDL HOUR: CPT

## 2018-01-01 PROCEDURE — 12000008 ZZH R&B INTERMEDIATE UMMC

## 2018-01-01 PROCEDURE — 93010 ELECTROCARDIOGRAM REPORT: CPT | Mod: ZP | Performed by: INTERNAL MEDICINE

## 2018-01-01 PROCEDURE — 85610 PROTHROMBIN TIME: CPT | Performed by: PHYSICIAN ASSISTANT

## 2018-01-01 PROCEDURE — 27210995 ZZH RX 272: Performed by: STUDENT IN AN ORGANIZED HEALTH CARE EDUCATION/TRAINING PROGRAM

## 2018-01-01 PROCEDURE — 25000128 H RX IP 250 OP 636: Performed by: PHYSICIAN ASSISTANT

## 2018-01-01 PROCEDURE — 99291 CRITICAL CARE FIRST HOUR: CPT | Mod: GC | Performed by: INTERNAL MEDICINE

## 2018-01-01 PROCEDURE — 86706 HEP B SURFACE ANTIBODY: CPT | Performed by: PHYSICIAN ASSISTANT

## 2018-01-01 PROCEDURE — 85027 COMPLETE CBC AUTOMATED: CPT | Performed by: INTERNAL MEDICINE

## 2018-01-01 PROCEDURE — 86901 BLOOD TYPING SEROLOGIC RH(D): CPT | Performed by: PHYSICIAN ASSISTANT

## 2018-01-01 PROCEDURE — 80048 BASIC METABOLIC PNL TOTAL CA: CPT | Performed by: PHYSICIAN ASSISTANT

## 2018-01-01 PROCEDURE — 25000125 ZZHC RX 250: Performed by: INTERNAL MEDICINE

## 2018-01-01 PROCEDURE — 40000558 ZZH STATISTIC CVC DRESSING CHANGE

## 2018-01-01 PROCEDURE — 83036 HEMOGLOBIN GLYCOSYLATED A1C: CPT | Performed by: STUDENT IN AN ORGANIZED HEALTH CARE EDUCATION/TRAINING PROGRAM

## 2018-01-01 PROCEDURE — 80048 BASIC METABOLIC PNL TOTAL CA: CPT | Performed by: INTERNAL MEDICINE

## 2018-01-01 PROCEDURE — 87186 SC STD MICRODIL/AGAR DIL: CPT | Performed by: STUDENT IN AN ORGANIZED HEALTH CARE EDUCATION/TRAINING PROGRAM

## 2018-01-01 PROCEDURE — 99152 MOD SED SAME PHYS/QHP 5/>YRS: CPT | Performed by: COLON & RECTAL SURGERY

## 2018-01-01 PROCEDURE — 71045 X-RAY EXAM CHEST 1 VIEW: CPT

## 2018-01-01 PROCEDURE — A9270 NON-COVERED ITEM OR SERVICE: HCPCS | Mod: GY | Performed by: PHYSICIAN ASSISTANT

## 2018-01-01 PROCEDURE — 83605 ASSAY OF LACTIC ACID: CPT | Performed by: NURSE PRACTITIONER

## 2018-01-01 PROCEDURE — 84165 PROTEIN E-PHORESIS SERUM: CPT

## 2018-01-01 PROCEDURE — 36592 COLLECT BLOOD FROM PICC: CPT | Performed by: INTERNAL MEDICINE

## 2018-01-01 PROCEDURE — 25000125 ZZHC RX 250: Performed by: PHYSICIAN ASSISTANT

## 2018-01-01 PROCEDURE — 84484 ASSAY OF TROPONIN QUANT: CPT | Performed by: INTERNAL MEDICINE

## 2018-01-01 PROCEDURE — 87077 CULTURE AEROBIC IDENTIFY: CPT | Performed by: STUDENT IN AN ORGANIZED HEALTH CARE EDUCATION/TRAINING PROGRAM

## 2018-01-01 PROCEDURE — A9270 NON-COVERED ITEM OR SERVICE: HCPCS | Mod: GY | Performed by: INTERNAL MEDICINE

## 2018-01-01 PROCEDURE — 85384 FIBRINOGEN ACTIVITY: CPT | Performed by: INTERNAL MEDICINE

## 2018-01-01 PROCEDURE — 83615 LACTATE (LD) (LDH) ENZYME: CPT | Performed by: PHYSICIAN ASSISTANT

## 2018-01-01 PROCEDURE — 40000281 ZZH STATISTIC TRANSPORT TIME EA 15 MIN

## 2018-01-01 PROCEDURE — 00000159 ZZHCL STATISTIC H-SEND OUTS PREP: Performed by: COLON & RECTAL SURGERY

## 2018-01-01 PROCEDURE — 82330 ASSAY OF CALCIUM: CPT | Performed by: STUDENT IN AN ORGANIZED HEALTH CARE EDUCATION/TRAINING PROGRAM

## 2018-01-01 PROCEDURE — 5A1935Z RESPIRATORY VENTILATION, LESS THAN 24 CONSECUTIVE HOURS: ICD-10-PCS | Performed by: INTERNAL MEDICINE

## 2018-01-01 PROCEDURE — 83605 ASSAY OF LACTIC ACID: CPT | Performed by: INTERNAL MEDICINE

## 2018-01-01 PROCEDURE — 88341 IMHCHEM/IMCYTCHM EA ADD ANTB: CPT | Performed by: COLON & RECTAL SURGERY

## 2018-01-01 PROCEDURE — 25000128 H RX IP 250 OP 636: Performed by: STUDENT IN AN ORGANIZED HEALTH CARE EDUCATION/TRAINING PROGRAM

## 2018-01-01 PROCEDURE — 84550 ASSAY OF BLOOD/URIC ACID: CPT | Performed by: INTERNAL MEDICINE

## 2018-01-01 PROCEDURE — 25000132 ZZH RX MED GY IP 250 OP 250 PS 637: Mod: GY | Performed by: INTERNAL MEDICINE

## 2018-01-01 PROCEDURE — 84550 ASSAY OF BLOOD/URIC ACID: CPT | Performed by: STUDENT IN AN ORGANIZED HEALTH CARE EDUCATION/TRAINING PROGRAM

## 2018-01-01 PROCEDURE — 93010 ELECTROCARDIOGRAM REPORT: CPT | Performed by: INTERNAL MEDICINE

## 2018-01-01 PROCEDURE — 25800025 ZZH RX 258: Performed by: STUDENT IN AN ORGANIZED HEALTH CARE EDUCATION/TRAINING PROGRAM

## 2018-01-01 PROCEDURE — 87086 URINE CULTURE/COLONY COUNT: CPT | Performed by: PHYSICIAN ASSISTANT

## 2018-01-01 PROCEDURE — 85384 FIBRINOGEN ACTIVITY: CPT | Performed by: STUDENT IN AN ORGANIZED HEALTH CARE EDUCATION/TRAINING PROGRAM

## 2018-01-01 PROCEDURE — 80053 COMPREHEN METABOLIC PANEL: CPT | Performed by: INTERNAL MEDICINE

## 2018-01-01 PROCEDURE — 70490 CT SOFT TISSUE NECK W/O DYE: CPT

## 2018-01-01 PROCEDURE — 84100 ASSAY OF PHOSPHORUS: CPT | Performed by: INTERNAL MEDICINE

## 2018-01-01 PROCEDURE — 25000125 ZZHC RX 250: Performed by: STUDENT IN AN ORGANIZED HEALTH CARE EDUCATION/TRAINING PROGRAM

## 2018-01-01 PROCEDURE — 82803 BLOOD GASES ANY COMBINATION: CPT | Performed by: STUDENT IN AN ORGANIZED HEALTH CARE EDUCATION/TRAINING PROGRAM

## 2018-01-01 PROCEDURE — 84100 ASSAY OF PHOSPHORUS: CPT | Performed by: PHYSICIAN ASSISTANT

## 2018-01-01 PROCEDURE — 36600 WITHDRAWAL OF ARTERIAL BLOOD: CPT

## 2018-01-01 PROCEDURE — A9552 F18 FDG: HCPCS | Performed by: INTERNAL MEDICINE

## 2018-01-01 PROCEDURE — 82805 BLOOD GASES W/O2 SATURATION: CPT | Performed by: INTERNAL MEDICINE

## 2018-01-01 PROCEDURE — 83615 LACTATE (LD) (LDH) ENZYME: CPT | Performed by: INTERNAL MEDICINE

## 2018-01-01 PROCEDURE — 88342 IMHCHEM/IMCYTCHM 1ST ANTB: CPT | Performed by: COLON & RECTAL SURGERY

## 2018-01-01 PROCEDURE — 99292 CRITICAL CARE ADDL 30 MIN: CPT | Mod: GC | Performed by: INTERNAL MEDICINE

## 2018-01-01 PROCEDURE — 36592 COLLECT BLOOD FROM PICC: CPT | Performed by: PHYSICIAN ASSISTANT

## 2018-01-01 PROCEDURE — 87493 C DIFF AMPLIFIED PROBE: CPT | Performed by: PHYSICIAN ASSISTANT

## 2018-01-01 PROCEDURE — 85025 COMPLETE CBC W/AUTO DIFF WBC: CPT | Performed by: PHYSICIAN ASSISTANT

## 2018-01-01 PROCEDURE — 83735 ASSAY OF MAGNESIUM: CPT | Performed by: PHYSICIAN ASSISTANT

## 2018-01-01 PROCEDURE — 80076 HEPATIC FUNCTION PANEL: CPT | Performed by: PHYSICIAN ASSISTANT

## 2018-01-01 PROCEDURE — 25000131 ZZH RX MED GY IP 250 OP 636 PS 637: Mod: GY | Performed by: INTERNAL MEDICINE

## 2018-01-01 PROCEDURE — 85027 COMPLETE CBC AUTOMATED: CPT

## 2018-01-01 PROCEDURE — 45380 COLONOSCOPY AND BIOPSY: CPT | Performed by: COLON & RECTAL SURGERY

## 2018-01-01 PROCEDURE — 25000128 H RX IP 250 OP 636: Performed by: PEDIATRICS

## 2018-01-01 PROCEDURE — 25000132 ZZH RX MED GY IP 250 OP 250 PS 637: Mod: GY | Performed by: STUDENT IN AN ORGANIZED HEALTH CARE EDUCATION/TRAINING PROGRAM

## 2018-01-01 PROCEDURE — 84484 ASSAY OF TROPONIN QUANT: CPT | Performed by: STUDENT IN AN ORGANIZED HEALTH CARE EDUCATION/TRAINING PROGRAM

## 2018-01-01 PROCEDURE — 83880 ASSAY OF NATRIURETIC PEPTIDE: CPT | Performed by: INTERNAL MEDICINE

## 2018-01-01 PROCEDURE — 88275 CYTOGENETICS 100-300: CPT

## 2018-01-01 PROCEDURE — 93308 TTE F-UP OR LMTD: CPT | Mod: 26 | Performed by: INTERNAL MEDICINE

## 2018-01-01 PROCEDURE — 45378 DIAGNOSTIC COLONOSCOPY: CPT | Performed by: COLON & RECTAL SURGERY

## 2018-01-01 PROCEDURE — 80076 HEPATIC FUNCTION PANEL: CPT | Performed by: STUDENT IN AN ORGANIZED HEALTH CARE EDUCATION/TRAINING PROGRAM

## 2018-01-01 PROCEDURE — 80048 BASIC METABOLIC PNL TOTAL CA: CPT | Performed by: STUDENT IN AN ORGANIZED HEALTH CARE EDUCATION/TRAINING PROGRAM

## 2018-01-01 PROCEDURE — 93005 ELECTROCARDIOGRAM TRACING: CPT

## 2018-01-01 PROCEDURE — 25000125 ZZHC RX 250

## 2018-01-01 PROCEDURE — 36592 COLLECT BLOOD FROM PICC: CPT | Performed by: NURSE PRACTITIONER

## 2018-01-01 PROCEDURE — 94003 VENT MGMT INPAT SUBQ DAY: CPT

## 2018-01-01 PROCEDURE — 88184 FLOWCYTOMETRY/ TC 1 MARKER: CPT | Performed by: COLON & RECTAL SURGERY

## 2018-01-01 PROCEDURE — 85730 THROMBOPLASTIN TIME PARTIAL: CPT | Performed by: PHYSICIAN ASSISTANT

## 2018-01-01 PROCEDURE — 87340 HEPATITIS B SURFACE AG IA: CPT | Performed by: PHYSICIAN ASSISTANT

## 2018-01-01 PROCEDURE — 83605 ASSAY OF LACTIC ACID: CPT | Performed by: STUDENT IN AN ORGANIZED HEALTH CARE EDUCATION/TRAINING PROGRAM

## 2018-01-01 PROCEDURE — 71250 CT THORAX DX C-: CPT

## 2018-01-01 PROCEDURE — 40000264 ECHO LIMITED WITH OPTISON

## 2018-01-01 PROCEDURE — P9059 PLASMA, FRZ BETWEEN 8-24HOUR: HCPCS | Performed by: INTERNAL MEDICINE

## 2018-01-01 PROCEDURE — 82550 ASSAY OF CK (CPK): CPT | Performed by: INTERNAL MEDICINE

## 2018-01-01 PROCEDURE — 88305 TISSUE EXAM BY PATHOLOGIST: CPT | Performed by: COLON & RECTAL SURGERY

## 2018-01-01 PROCEDURE — 83690 ASSAY OF LIPASE: CPT

## 2018-01-01 PROCEDURE — 3E043XZ INTRODUCTION OF VASOPRESSOR INTO CENTRAL VEIN, PERCUTANEOUS APPROACH: ICD-10-PCS | Performed by: INTERNAL MEDICINE

## 2018-01-01 PROCEDURE — 25500064 ZZH RX 255 OP 636: Performed by: INTERNAL MEDICINE

## 2018-01-01 PROCEDURE — 83735 ASSAY OF MAGNESIUM: CPT | Performed by: STUDENT IN AN ORGANIZED HEALTH CARE EDUCATION/TRAINING PROGRAM

## 2018-01-01 PROCEDURE — 86695 HERPES SIMPLEX TYPE 1 TEST: CPT | Performed by: PHYSICIAN ASSISTANT

## 2018-01-01 PROCEDURE — 84478 ASSAY OF TRIGLYCERIDES: CPT | Performed by: PHYSICIAN ASSISTANT

## 2018-01-01 PROCEDURE — 85730 THROMBOPLASTIN TIME PARTIAL: CPT | Performed by: NURSE PRACTITIONER

## 2018-01-01 PROCEDURE — 88185 FLOWCYTOMETRY/TC ADD-ON: CPT | Performed by: COLON & RECTAL SURGERY

## 2018-01-01 PROCEDURE — 94002 VENT MGMT INPAT INIT DAY: CPT

## 2018-01-01 PROCEDURE — 00000155 ZZHCL STATISTIC H-CELL BLOCK W/STAIN: Performed by: COLON & RECTAL SURGERY

## 2018-01-01 PROCEDURE — 25000132 ZZH RX MED GY IP 250 OP 250 PS 637: Mod: GY | Performed by: NURSE PRACTITIONER

## 2018-01-01 PROCEDURE — 99153 MOD SED SAME PHYS/QHP EA: CPT | Performed by: COLON & RECTAL SURGERY

## 2018-01-01 PROCEDURE — 84100 ASSAY OF PHOSPHORUS: CPT

## 2018-01-01 PROCEDURE — 82150 ASSAY OF AMYLASE: CPT

## 2018-01-01 PROCEDURE — P9037 PLATE PHERES LEUKOREDU IRRAD: HCPCS | Performed by: INTERNAL MEDICINE

## 2018-01-01 PROCEDURE — 93010 ELECTROCARDIOGRAM REPORT: CPT | Mod: 76 | Performed by: INTERNAL MEDICINE

## 2018-01-01 PROCEDURE — 40000344 ZZHCL STATISTIC THAWING COMPONENT: Performed by: INTERNAL MEDICINE

## 2018-01-01 PROCEDURE — 40000141 ZZH STATISTIC PERIPHERAL IV START W/O US GUIDANCE

## 2018-01-01 PROCEDURE — 82330 ASSAY OF CALCIUM: CPT | Performed by: PHYSICIAN ASSISTANT

## 2018-01-01 PROCEDURE — P9037 PLATE PHERES LEUKOREDU IRRAD: HCPCS | Performed by: STUDENT IN AN ORGANIZED HEALTH CARE EDUCATION/TRAINING PROGRAM

## 2018-01-01 PROCEDURE — 27210995 ZZH RX 272: Performed by: PHYSICIAN ASSISTANT

## 2018-01-01 PROCEDURE — 40000802 ZZH SITE CHECK

## 2018-01-01 PROCEDURE — 40000893 ZZH STATISTIC PT IP EVAL DEFER

## 2018-01-01 PROCEDURE — 86850 RBC ANTIBODY SCREEN: CPT | Performed by: STUDENT IN AN ORGANIZED HEALTH CARE EDUCATION/TRAINING PROGRAM

## 2018-01-01 PROCEDURE — 20000004 ZZH R&B ICU UMMC

## 2018-01-01 PROCEDURE — 25800025 ZZH RX 258: Performed by: PHYSICIAN ASSISTANT

## 2018-01-01 PROCEDURE — 40000986 XR CHEST PORT 1 VW

## 2018-01-01 PROCEDURE — 84484 ASSAY OF TROPONIN QUANT: CPT | Performed by: PHYSICIAN ASSISTANT

## 2018-01-01 PROCEDURE — 40000014 ZZH STATISTIC ARTERIAL MONITORING DAILY

## 2018-01-01 PROCEDURE — 87506 IADNA-DNA/RNA PROBE TQ 6-11: CPT | Performed by: PHYSICIAN ASSISTANT

## 2018-01-01 PROCEDURE — 40000671 ZZH STATISTIC ANESTHESIA CASE

## 2018-01-01 PROCEDURE — 85610 PROTHROMBIN TIME: CPT | Performed by: INTERNAL MEDICINE

## 2018-01-01 PROCEDURE — 25000128 H RX IP 250 OP 636: Performed by: COLON & RECTAL SURGERY

## 2018-01-01 PROCEDURE — 27210208 ZZH KIT VALVED DOUBLE LUMEN

## 2018-01-01 PROCEDURE — 94660 CPAP INITIATION&MGMT: CPT

## 2018-01-01 PROCEDURE — 25800025 ZZH RX 258: Performed by: INTERNAL MEDICINE

## 2018-01-01 PROCEDURE — 40000275 ZZH STATISTIC RCP TIME EA 10 MIN

## 2018-01-01 PROCEDURE — 85610 PROTHROMBIN TIME: CPT | Performed by: STUDENT IN AN ORGANIZED HEALTH CARE EDUCATION/TRAINING PROGRAM

## 2018-01-01 PROCEDURE — 85018 HEMOGLOBIN: CPT | Performed by: NURSE PRACTITIONER

## 2018-01-01 PROCEDURE — 84100 ASSAY OF PHOSPHORUS: CPT | Performed by: STUDENT IN AN ORGANIZED HEALTH CARE EDUCATION/TRAINING PROGRAM

## 2018-01-01 PROCEDURE — 85025 COMPLETE CBC W/AUTO DIFF WBC: CPT | Performed by: INTERNAL MEDICINE

## 2018-01-01 PROCEDURE — 99207 ZZC APP CREDIT; MD BILLING SHARED VISIT: CPT | Performed by: SURGERY

## 2018-01-01 PROCEDURE — 82805 BLOOD GASES W/O2 SATURATION: CPT | Performed by: STUDENT IN AN ORGANIZED HEALTH CARE EDUCATION/TRAINING PROGRAM

## 2018-01-01 PROCEDURE — 88271 CYTOGENETICS DNA PROBE: CPT

## 2018-01-01 PROCEDURE — 86850 RBC ANTIBODY SCREEN: CPT | Performed by: PHYSICIAN ASSISTANT

## 2018-01-01 PROCEDURE — 84132 ASSAY OF SERUM POTASSIUM: CPT | Performed by: INTERNAL MEDICINE

## 2018-01-01 PROCEDURE — 12000001 ZZH R&B MED SURG/OB UMMC

## 2018-01-01 PROCEDURE — 84550 ASSAY OF BLOOD/URIC ACID: CPT

## 2018-01-01 PROCEDURE — 84134 ASSAY OF PREALBUMIN: CPT | Performed by: INTERNAL MEDICINE

## 2018-01-01 PROCEDURE — 36415 COLL VENOUS BLD VENIPUNCTURE: CPT | Performed by: STUDENT IN AN ORGANIZED HEALTH CARE EDUCATION/TRAINING PROGRAM

## 2018-01-01 PROCEDURE — 87800 DETECT AGNT MULT DNA DIREC: CPT | Performed by: STUDENT IN AN ORGANIZED HEALTH CARE EDUCATION/TRAINING PROGRAM

## 2018-01-01 PROCEDURE — 93306 TTE W/DOPPLER COMPLETE: CPT | Mod: 26 | Performed by: INTERNAL MEDICINE

## 2018-01-01 PROCEDURE — 85610 PROTHROMBIN TIME: CPT | Performed by: NURSE PRACTITIONER

## 2018-01-01 PROCEDURE — 86901 BLOOD TYPING SEROLOGIC RH(D): CPT | Performed by: STUDENT IN AN ORGANIZED HEALTH CARE EDUCATION/TRAINING PROGRAM

## 2018-01-01 PROCEDURE — 99222 1ST HOSP IP/OBS MODERATE 55: CPT | Mod: 25 | Performed by: INTERNAL MEDICINE

## 2018-01-01 PROCEDURE — 25000128 H RX IP 250 OP 636: Performed by: NURSE PRACTITIONER

## 2018-01-01 PROCEDURE — 94644 CONT INHLJ TX 1ST HOUR: CPT

## 2018-01-01 PROCEDURE — 00000402 ZZHCL STATISTIC TOTAL PROTEIN

## 2018-01-01 PROCEDURE — 88173 CYTOPATH EVAL FNA REPORT: CPT | Performed by: COLON & RECTAL SURGERY

## 2018-01-01 PROCEDURE — 86900 BLOOD TYPING SEROLOGIC ABO: CPT | Performed by: PHYSICIAN ASSISTANT

## 2018-01-01 PROCEDURE — 93321 DOPPLER ECHO F-UP/LMTD STD: CPT | Mod: 26 | Performed by: INTERNAL MEDICINE

## 2018-01-01 PROCEDURE — P9041 ALBUMIN (HUMAN),5%, 50ML: HCPCS | Performed by: PEDIATRICS

## 2018-01-01 PROCEDURE — 85384 FIBRINOGEN ACTIVITY: CPT | Performed by: PHYSICIAN ASSISTANT

## 2018-01-01 PROCEDURE — 86900 BLOOD TYPING SEROLOGIC ABO: CPT | Performed by: STUDENT IN AN ORGANIZED HEALTH CARE EDUCATION/TRAINING PROGRAM

## 2018-01-01 PROCEDURE — 86696 HERPES SIMPLEX TYPE 2 TEST: CPT | Performed by: PHYSICIAN ASSISTANT

## 2018-01-01 PROCEDURE — 00000467 ZZHCL STATISTIC CYTO FNA IM TC STAT 88172: Performed by: COLON & RECTAL SURGERY

## 2018-01-01 PROCEDURE — 94640 AIRWAY INHALATION TREATMENT: CPT | Mod: 76

## 2018-01-01 PROCEDURE — 85027 COMPLETE CBC AUTOMATED: CPT | Performed by: STUDENT IN AN ORGANIZED HEALTH CARE EDUCATION/TRAINING PROGRAM

## 2018-01-01 PROCEDURE — P9047 ALBUMIN (HUMAN), 25%, 50ML: HCPCS | Performed by: STUDENT IN AN ORGANIZED HEALTH CARE EDUCATION/TRAINING PROGRAM

## 2018-01-01 PROCEDURE — 25000128 H RX IP 250 OP 636

## 2018-01-01 PROCEDURE — 40000196 ZZH STATISTIC RAPCV CVP MONITORING

## 2018-01-01 PROCEDURE — 93325 DOPPLER ECHO COLOR FLOW MAPG: CPT | Mod: 26 | Performed by: INTERNAL MEDICINE

## 2018-01-01 PROCEDURE — 80053 COMPREHEN METABOLIC PANEL: CPT

## 2018-01-01 PROCEDURE — 82330 ASSAY OF CALCIUM: CPT | Performed by: INTERNAL MEDICINE

## 2018-01-01 PROCEDURE — 36569 INSJ PICC 5 YR+ W/O IMAGING: CPT

## 2018-01-01 PROCEDURE — 81001 URINALYSIS AUTO W/SCOPE: CPT | Performed by: PHYSICIAN ASSISTANT

## 2018-01-01 PROCEDURE — 85730 THROMBOPLASTIN TIME PARTIAL: CPT | Performed by: STUDENT IN AN ORGANIZED HEALTH CARE EDUCATION/TRAINING PROGRAM

## 2018-01-01 PROCEDURE — 83615 LACTATE (LD) (LDH) ENZYME: CPT

## 2018-01-01 PROCEDURE — 86803 HEPATITIS C AB TEST: CPT | Performed by: PHYSICIAN ASSISTANT

## 2018-01-01 PROCEDURE — 83735 ASSAY OF MAGNESIUM: CPT

## 2018-01-01 PROCEDURE — 82803 BLOOD GASES ANY COMBINATION: CPT | Performed by: NURSE PRACTITIONER

## 2018-01-01 PROCEDURE — 40000986 XR ABDOMEN PORT F1 VW

## 2018-01-01 PROCEDURE — 85025 COMPLETE CBC W/AUTO DIFF WBC: CPT

## 2018-01-01 PROCEDURE — 34300033 ZZH RX 343: Performed by: INTERNAL MEDICINE

## 2018-01-01 PROCEDURE — 87040 BLOOD CULTURE FOR BACTERIA: CPT | Performed by: STUDENT IN AN ORGANIZED HEALTH CARE EDUCATION/TRAINING PROGRAM

## 2018-01-01 PROCEDURE — 85384 FIBRINOGEN ACTIVITY: CPT | Performed by: NURSE PRACTITIONER

## 2018-01-01 RX ORDER — METHYLPREDNISOLONE SODIUM SUCCINATE 125 MG/2ML
125 INJECTION, POWDER, LYOPHILIZED, FOR SOLUTION INTRAMUSCULAR; INTRAVENOUS
Status: DISCONTINUED | OUTPATIENT
Start: 2018-01-01 | End: 2018-02-03 | Stop reason: HOSPADM

## 2018-01-01 RX ORDER — SENNOSIDES 8.6 MG
1-2 TABLET ORAL 2 TIMES DAILY
Status: DISCONTINUED | OUTPATIENT
Start: 2018-01-01 | End: 2018-02-03 | Stop reason: HOSPADM

## 2018-01-01 RX ORDER — ALBUMIN (HUMAN) 12.5 G/50ML
50 SOLUTION INTRAVENOUS ONCE
Status: DISCONTINUED | OUTPATIENT
Start: 2018-01-01 | End: 2018-01-01

## 2018-01-01 RX ORDER — ACETAMINOPHEN 325 MG/1
650 TABLET ORAL EVERY 4 HOURS PRN
Status: DISCONTINUED | OUTPATIENT
Start: 2018-01-01 | End: 2018-02-03 | Stop reason: HOSPADM

## 2018-01-01 RX ORDER — ASPIRIN 81 MG/1
162 TABLET, CHEWABLE ORAL DAILY
Status: DISCONTINUED | OUTPATIENT
Start: 2018-01-01 | End: 2018-01-01

## 2018-01-01 RX ORDER — ATORVASTATIN CALCIUM 40 MG/1
TABLET, FILM COATED ORAL
COMMUNITY
Start: 2018-01-01

## 2018-01-01 RX ORDER — EPINEPHRINE 1 MG/ML
0.3 INJECTION, SOLUTION, CONCENTRATE INTRAVENOUS EVERY 5 MIN PRN
Status: DISCONTINUED | OUTPATIENT
Start: 2018-01-01 | End: 2018-02-03 | Stop reason: HOSPADM

## 2018-01-01 RX ORDER — ASPIRIN 81 MG/1
161 TABLET ORAL DAILY
Status: DISCONTINUED | OUTPATIENT
Start: 2018-01-01 | End: 2018-01-01

## 2018-01-01 RX ORDER — ONDANSETRON 4 MG/1
8 TABLET, FILM COATED ORAL EVERY 8 HOURS PRN
Status: DISCONTINUED | OUTPATIENT
Start: 2018-01-01 | End: 2018-02-03 | Stop reason: HOSPADM

## 2018-01-01 RX ORDER — FENTANYL CITRATE 50 UG/ML
INJECTION, SOLUTION INTRAMUSCULAR; INTRAVENOUS PRN
Status: DISCONTINUED | OUTPATIENT
Start: 2018-01-01 | End: 2018-01-01 | Stop reason: HOSPADM

## 2018-01-01 RX ORDER — METOPROLOL TARTRATE 25 MG/1
50 TABLET, FILM COATED ORAL 2 TIMES DAILY
Status: DISCONTINUED | OUTPATIENT
Start: 2018-01-01 | End: 2018-01-01

## 2018-01-01 RX ORDER — DEXTROSE MONOHYDRATE 25 G/50ML
25-50 INJECTION, SOLUTION INTRAVENOUS
Status: DISCONTINUED | OUTPATIENT
Start: 2018-01-01 | End: 2018-01-01

## 2018-01-01 RX ORDER — ALBUMIN (HUMAN) 12.5 G/50ML
SOLUTION INTRAVENOUS
Status: DISPENSED
Start: 2018-01-01 | End: 2018-01-01

## 2018-01-01 RX ORDER — MEPERIDINE HYDROCHLORIDE 50 MG/ML
25 INJECTION INTRAMUSCULAR; INTRAVENOUS; SUBCUTANEOUS EVERY 30 MIN PRN
Status: DISCONTINUED | OUTPATIENT
Start: 2018-01-01 | End: 2018-01-01

## 2018-01-01 RX ORDER — ALBUTEROL SULFATE 0.83 MG/ML
2.5 SOLUTION RESPIRATORY (INHALATION)
Status: DISCONTINUED | OUTPATIENT
Start: 2018-01-01 | End: 2018-01-01

## 2018-01-01 RX ORDER — DIPHENHYDRAMINE HCL 50 MG
50 CAPSULE ORAL ONCE
Status: COMPLETED | OUTPATIENT
Start: 2018-01-01 | End: 2018-01-01

## 2018-01-01 RX ORDER — FENTANYL CITRATE 50 UG/ML
0.5 INJECTION, SOLUTION INTRAMUSCULAR; INTRAVENOUS
Status: DISCONTINUED | OUTPATIENT
Start: 2018-01-01 | End: 2018-01-01

## 2018-01-01 RX ORDER — METOPROLOL SUCCINATE 25 MG/1
100 TABLET, EXTENDED RELEASE ORAL DAILY
Status: DISCONTINUED | OUTPATIENT
Start: 2018-01-01 | End: 2018-01-01

## 2018-01-01 RX ORDER — CIPROFLOXACIN 2 MG/ML
400 INJECTION, SOLUTION INTRAVENOUS EVERY 12 HOURS
Status: DISCONTINUED | OUTPATIENT
Start: 2018-01-01 | End: 2018-01-01

## 2018-01-01 RX ORDER — DIPHENHYDRAMINE HYDROCHLORIDE 50 MG/ML
50 INJECTION INTRAMUSCULAR; INTRAVENOUS
Status: DISCONTINUED | OUTPATIENT
Start: 2018-01-01 | End: 2018-02-03 | Stop reason: HOSPADM

## 2018-01-01 RX ORDER — HALOPERIDOL 5 MG/ML
INJECTION INTRAMUSCULAR
Status: DISCONTINUED
Start: 2018-01-01 | End: 2018-01-01 | Stop reason: HOSPADM

## 2018-01-01 RX ORDER — FLUORIDE TOOTHPASTE
5-10 TOOTHPASTE DENTAL 4 TIMES DAILY
Status: DISCONTINUED | OUTPATIENT
Start: 2018-01-01 | End: 2018-02-03 | Stop reason: HOSPADM

## 2018-01-01 RX ORDER — PROCHLORPERAZINE MALEATE 5 MG
5-10 TABLET ORAL EVERY 6 HOURS PRN
Status: DISCONTINUED | OUTPATIENT
Start: 2018-01-01 | End: 2018-02-03 | Stop reason: HOSPADM

## 2018-01-01 RX ORDER — SENNOSIDES 8.6 MG
1-2 TABLET ORAL 2 TIMES DAILY
Status: DISCONTINUED | OUTPATIENT
Start: 2018-01-01 | End: 2018-01-01

## 2018-01-01 RX ORDER — ALBUTEROL SULFATE 0.83 MG/ML
2.5 SOLUTION RESPIRATORY (INHALATION) EVERY 4 HOURS PRN
Status: DISCONTINUED | OUTPATIENT
Start: 2018-01-01 | End: 2018-02-03 | Stop reason: HOSPADM

## 2018-01-01 RX ORDER — PREDNISOLONE ACETATE 10 MG/ML
1 SUSPENSION/ DROPS OPHTHALMIC 4 TIMES DAILY
Status: DISCONTINUED | OUTPATIENT
Start: 2018-01-01 | End: 2018-02-03 | Stop reason: HOSPADM

## 2018-01-01 RX ORDER — POTASSIUM CHLORIDE 29.8 MG/ML
20 INJECTION INTRAVENOUS EVERY 6 HOURS PRN
Status: DISCONTINUED | OUTPATIENT
Start: 2018-01-01 | End: 2018-02-03 | Stop reason: HOSPADM

## 2018-01-01 RX ORDER — POLYETHYLENE GLYCOL 3350 17 G/17G
17 POWDER, FOR SOLUTION ORAL DAILY PRN
Status: DISCONTINUED | OUTPATIENT
Start: 2018-01-01 | End: 2018-01-01

## 2018-01-01 RX ORDER — PROPOFOL 10 MG/ML
10-20 INJECTION, EMULSION INTRAVENOUS EVERY 30 MIN PRN
Status: DISCONTINUED | OUTPATIENT
Start: 2018-01-01 | End: 2018-02-03 | Stop reason: HOSPADM

## 2018-01-01 RX ORDER — FUROSEMIDE 10 MG/ML
40 INJECTION INTRAMUSCULAR; INTRAVENOUS ONCE
Status: COMPLETED | OUTPATIENT
Start: 2018-01-01 | End: 2018-01-01

## 2018-01-01 RX ORDER — SODIUM CHLORIDE 9 MG/ML
INJECTION, SOLUTION INTRAVENOUS
Status: COMPLETED
Start: 2018-01-01 | End: 2018-01-01

## 2018-01-01 RX ORDER — ACETAMINOPHEN 325 MG/1
650 TABLET ORAL ONCE
Status: COMPLETED | OUTPATIENT
Start: 2018-01-01 | End: 2018-01-01

## 2018-01-01 RX ORDER — SODIUM CHLORIDE 9 MG/ML
INJECTION, SOLUTION INTRAVENOUS CONTINUOUS
Status: DISCONTINUED | OUTPATIENT
Start: 2018-01-01 | End: 2018-01-01

## 2018-01-01 RX ORDER — DEXTROSE MONOHYDRATE 25 G/50ML
25 INJECTION, SOLUTION INTRAVENOUS EVERY 4 HOURS PRN
Status: COMPLETED | OUTPATIENT
Start: 2018-01-01 | End: 2018-01-01

## 2018-01-01 RX ORDER — EPINEPHRINE 1 MG/ML
0.3 INJECTION, SOLUTION, CONCENTRATE INTRAVENOUS EVERY 5 MIN PRN
Status: DISCONTINUED | OUTPATIENT
Start: 2018-01-01 | End: 2018-01-01

## 2018-01-01 RX ORDER — AMOXICILLIN 250 MG
2 CAPSULE ORAL 2 TIMES DAILY PRN
Status: DISCONTINUED | OUTPATIENT
Start: 2018-01-01 | End: 2018-01-01

## 2018-01-01 RX ORDER — METHYLPREDNISOLONE SODIUM SUCCINATE 125 MG/2ML
125 INJECTION, POWDER, LYOPHILIZED, FOR SOLUTION INTRAMUSCULAR; INTRAVENOUS
Status: DISCONTINUED | OUTPATIENT
Start: 2018-01-01 | End: 2018-01-01

## 2018-01-01 RX ORDER — CALCIUM CHLORIDE 100 MG/ML
2 INJECTION INTRAVENOUS; INTRAVENTRICULAR ONCE
Status: DISCONTINUED | OUTPATIENT
Start: 2018-01-01 | End: 2018-01-01

## 2018-01-01 RX ORDER — DIPHENHYDRAMINE HYDROCHLORIDE 50 MG/ML
50 INJECTION INTRAMUSCULAR; INTRAVENOUS
Status: DISCONTINUED | OUTPATIENT
Start: 2018-01-01 | End: 2018-01-01

## 2018-01-01 RX ORDER — ACETAMINOPHEN 325 MG/1
650 TABLET ORAL EVERY 4 HOURS PRN
Status: DISCONTINUED | OUTPATIENT
Start: 2018-01-01 | End: 2018-01-01

## 2018-01-01 RX ORDER — MORPHINE SULFATE 2 MG/ML
2 INJECTION, SOLUTION INTRAMUSCULAR; INTRAVENOUS EVERY 4 HOURS PRN
Status: DISCONTINUED | OUTPATIENT
Start: 2018-01-01 | End: 2018-02-03 | Stop reason: HOSPADM

## 2018-01-01 RX ORDER — PROCHLORPERAZINE MALEATE 5 MG
5 TABLET ORAL EVERY 6 HOURS PRN
Status: DISCONTINUED | OUTPATIENT
Start: 2018-01-01 | End: 2018-01-01

## 2018-01-01 RX ORDER — TORSEMIDE 20 MG/1
20 TABLET ORAL DAILY
Status: DISCONTINUED | OUTPATIENT
Start: 2018-01-01 | End: 2018-01-01

## 2018-01-01 RX ORDER — ALBUTEROL SULFATE 90 UG/1
1-2 AEROSOL, METERED RESPIRATORY (INHALATION)
Status: DISCONTINUED | OUTPATIENT
Start: 2018-01-01 | End: 2018-01-01

## 2018-01-01 RX ORDER — DEXTROSE MONOHYDRATE 25 G/50ML
50 INJECTION, SOLUTION INTRAVENOUS ONCE
Status: COMPLETED | OUTPATIENT
Start: 2018-01-01 | End: 2018-01-01

## 2018-01-01 RX ORDER — ALLOPURINOL 300 MG/1
300 TABLET ORAL DAILY
Status: DISCONTINUED | OUTPATIENT
Start: 2018-01-01 | End: 2018-01-01

## 2018-01-01 RX ORDER — NITROGLYCERIN 0.4 MG/1
0.4 TABLET SUBLINGUAL EVERY 5 MIN PRN
Status: DISCONTINUED | OUTPATIENT
Start: 2018-01-01 | End: 2018-02-03 | Stop reason: HOSPADM

## 2018-01-01 RX ORDER — POTASSIUM CHLORIDE 750 MG/1
40 TABLET, EXTENDED RELEASE ORAL ONCE
Status: COMPLETED | OUTPATIENT
Start: 2018-01-01 | End: 2018-01-01

## 2018-01-01 RX ORDER — AMOXICILLIN 250 MG
1 CAPSULE ORAL 2 TIMES DAILY PRN
Status: DISCONTINUED | OUTPATIENT
Start: 2018-01-01 | End: 2018-01-01

## 2018-01-01 RX ORDER — SODIUM CHLORIDE 9 MG/ML
1000 INJECTION, SOLUTION INTRAVENOUS CONTINUOUS PRN
Status: DISCONTINUED | OUTPATIENT
Start: 2018-01-01 | End: 2018-01-01

## 2018-01-01 RX ORDER — ALBUMIN, HUMAN INJ 5% 5 %
50 SOLUTION INTRAVENOUS ONCE
Status: COMPLETED | OUTPATIENT
Start: 2018-01-01 | End: 2018-01-01

## 2018-01-01 RX ORDER — LORAZEPAM 2 MG/ML
.5-1 INJECTION INTRAMUSCULAR EVERY 6 HOURS PRN
Status: DISCONTINUED | OUTPATIENT
Start: 2018-01-01 | End: 2018-02-03 | Stop reason: HOSPADM

## 2018-01-01 RX ORDER — ONDANSETRON 8 MG/1
8 TABLET, FILM COATED ORAL EVERY 12 HOURS
Status: COMPLETED | OUTPATIENT
Start: 2018-01-01 | End: 2018-01-01

## 2018-01-01 RX ORDER — DEXTROSE MONOHYDRATE 25 G/50ML
25-50 INJECTION, SOLUTION INTRAVENOUS
Status: DISCONTINUED | OUTPATIENT
Start: 2018-01-01 | End: 2018-02-03 | Stop reason: HOSPADM

## 2018-01-01 RX ORDER — ALBUMIN (HUMAN) 12.5 G/50ML
50 SOLUTION INTRAVENOUS ONCE
Status: COMPLETED | OUTPATIENT
Start: 2018-01-01 | End: 2018-01-01

## 2018-01-01 RX ORDER — ALBUMIN (HUMAN) 12.5 G/50ML
25 SOLUTION INTRAVENOUS ONCE
Status: COMPLETED | OUTPATIENT
Start: 2018-01-01 | End: 2018-01-01

## 2018-01-01 RX ORDER — SIMETHICONE 80 MG
80-160 TABLET,CHEWABLE ORAL 4 TIMES DAILY PRN
Status: DISCONTINUED | OUTPATIENT
Start: 2018-01-01 | End: 2018-02-03 | Stop reason: HOSPADM

## 2018-01-01 RX ORDER — NICOTINE POLACRILEX 4 MG
15-30 LOZENGE BUCCAL
Status: DISCONTINUED | OUTPATIENT
Start: 2018-01-01 | End: 2018-01-01

## 2018-01-01 RX ORDER — NALOXONE HYDROCHLORIDE 0.4 MG/ML
.1-.4 INJECTION, SOLUTION INTRAMUSCULAR; INTRAVENOUS; SUBCUTANEOUS
Status: DISCONTINUED | OUTPATIENT
Start: 2018-01-01 | End: 2018-02-03 | Stop reason: HOSPADM

## 2018-01-01 RX ORDER — LISINOPRIL 2.5 MG/1
10 TABLET ORAL DAILY
Status: DISCONTINUED | OUTPATIENT
Start: 2018-01-01 | End: 2018-01-01

## 2018-01-01 RX ORDER — MEPERIDINE HYDROCHLORIDE 25 MG/ML
25 INJECTION INTRAMUSCULAR; INTRAVENOUS; SUBCUTANEOUS EVERY 30 MIN PRN
Status: DISCONTINUED | OUTPATIENT
Start: 2018-01-01 | End: 2018-02-03 | Stop reason: HOSPADM

## 2018-01-01 RX ORDER — METHYLPREDNISOLONE SODIUM SUCCINATE 125 MG/2ML
100 INJECTION, POWDER, LYOPHILIZED, FOR SOLUTION INTRAMUSCULAR; INTRAVENOUS DAILY
Status: COMPLETED | OUTPATIENT
Start: 2018-01-01 | End: 2018-01-01

## 2018-01-01 RX ORDER — MORPHINE SULFATE 2 MG/ML
INJECTION, SOLUTION INTRAMUSCULAR; INTRAVENOUS
Status: DISCONTINUED
Start: 2018-01-01 | End: 2018-01-01 | Stop reason: HOSPADM

## 2018-01-01 RX ORDER — VECURONIUM BROMIDE 1 MG/ML
10 INJECTION, POWDER, LYOPHILIZED, FOR SOLUTION INTRAVENOUS ONCE
Status: COMPLETED | OUTPATIENT
Start: 2018-01-01 | End: 2018-01-01

## 2018-01-01 RX ORDER — MIDAZOLAM (PF) 1 MG/ML IN 0.9 % SODIUM CHLORIDE INTRAVENOUS SOLUTION
1-8 CONTINUOUS
Status: DISCONTINUED | OUTPATIENT
Start: 2018-01-01 | End: 2018-02-03 | Stop reason: HOSPADM

## 2018-01-01 RX ORDER — ALBUTEROL SULFATE 90 UG/1
1-2 AEROSOL, METERED RESPIRATORY (INHALATION)
Status: DISCONTINUED | OUTPATIENT
Start: 2018-01-01 | End: 2018-02-03 | Stop reason: HOSPADM

## 2018-01-01 RX ORDER — HEPARIN SODIUM,PORCINE 10 UNIT/ML
2-5 VIAL (ML) INTRAVENOUS
Status: COMPLETED | OUTPATIENT
Start: 2018-01-01 | End: 2018-01-01

## 2018-01-01 RX ORDER — SODIUM CHLORIDE 9 MG/ML
1000 INJECTION, SOLUTION INTRAVENOUS CONTINUOUS PRN
Status: DISCONTINUED | OUTPATIENT
Start: 2018-01-01 | End: 2018-02-03 | Stop reason: HOSPADM

## 2018-01-01 RX ORDER — NITROGLYCERIN 20 MG/100ML
0.07-2 INJECTION INTRAVENOUS CONTINUOUS
Status: DISCONTINUED | OUTPATIENT
Start: 2018-01-01 | End: 2018-01-01

## 2018-01-01 RX ORDER — ONDANSETRON 2 MG/ML
8 INJECTION INTRAMUSCULAR; INTRAVENOUS EVERY 8 HOURS PRN
Status: DISCONTINUED | OUTPATIENT
Start: 2018-01-01 | End: 2018-02-03 | Stop reason: HOSPADM

## 2018-01-01 RX ORDER — LIDOCAINE 40 MG/G
CREAM TOPICAL
Status: CANCELLED | OUTPATIENT
Start: 2018-01-01

## 2018-01-01 RX ORDER — ALBUTEROL SULFATE 0.83 MG/ML
2.5 SOLUTION RESPIRATORY (INHALATION) ONCE
Status: COMPLETED | OUTPATIENT
Start: 2018-01-01 | End: 2018-01-01

## 2018-01-01 RX ORDER — ONDANSETRON 8 MG/1
16 TABLET, FILM COATED ORAL ONCE
Status: COMPLETED | OUTPATIENT
Start: 2018-01-01 | End: 2018-01-01

## 2018-01-01 RX ORDER — IOPAMIDOL 755 MG/ML
54 INJECTION, SOLUTION INTRAVASCULAR ONCE
Status: DISCONTINUED | OUTPATIENT
Start: 2018-01-01 | End: 2018-01-01

## 2018-01-01 RX ORDER — MORPHINE SULFATE 2 MG/ML
2 INJECTION, SOLUTION INTRAMUSCULAR; INTRAVENOUS ONCE
Status: COMPLETED | OUTPATIENT
Start: 2018-01-01 | End: 2018-01-01

## 2018-01-01 RX ORDER — DEXAMETHASONE 4 MG/1
40 TABLET ORAL DAILY
Status: DISCONTINUED | OUTPATIENT
Start: 2018-01-01 | End: 2018-01-01

## 2018-01-01 RX ORDER — ALLOPURINOL 300 MG/1
300 TABLET ORAL DAILY
Status: DISCONTINUED | OUTPATIENT
Start: 2018-01-01 | End: 2018-02-03 | Stop reason: HOSPADM

## 2018-01-01 RX ORDER — HYDRALAZINE HYDROCHLORIDE 10 MG/1
10 TABLET, FILM COATED ORAL 4 TIMES DAILY
Status: DISCONTINUED | OUTPATIENT
Start: 2018-01-01 | End: 2018-01-01

## 2018-01-01 RX ORDER — PROPOFOL 10 MG/ML
5-75 INJECTION, EMULSION INTRAVENOUS CONTINUOUS
Status: DISCONTINUED | OUTPATIENT
Start: 2018-01-01 | End: 2018-01-01

## 2018-01-01 RX ORDER — NICOTINE POLACRILEX 4 MG
15-30 LOZENGE BUCCAL
Status: DISCONTINUED | OUTPATIENT
Start: 2018-01-01 | End: 2018-02-03 | Stop reason: HOSPADM

## 2018-01-01 RX ORDER — LORAZEPAM 0.5 MG/1
.5-1 TABLET ORAL EVERY 6 HOURS PRN
Status: DISCONTINUED | OUTPATIENT
Start: 2018-01-01 | End: 2018-02-03 | Stop reason: HOSPADM

## 2018-01-01 RX ORDER — DEXTROSE MONOHYDRATE 25 G/50ML
50 INJECTION, SOLUTION INTRAVENOUS ONCE
Status: DISCONTINUED | OUTPATIENT
Start: 2018-01-01 | End: 2018-01-01

## 2018-01-01 RX ORDER — NALOXONE HYDROCHLORIDE 0.4 MG/ML
.1-.4 INJECTION, SOLUTION INTRAMUSCULAR; INTRAVENOUS; SUBCUTANEOUS
Status: DISCONTINUED | OUTPATIENT
Start: 2018-01-01 | End: 2018-01-01

## 2018-01-01 RX ORDER — ONDANSETRON 4 MG/1
8 TABLET, ORALLY DISINTEGRATING ORAL EVERY 8 HOURS PRN
Status: DISCONTINUED | OUTPATIENT
Start: 2018-01-01 | End: 2018-02-03 | Stop reason: HOSPADM

## 2018-01-01 RX ORDER — ALBUTEROL SULFATE 0.83 MG/ML
2.5 SOLUTION RESPIRATORY (INHALATION)
Status: DISCONTINUED | OUTPATIENT
Start: 2018-01-01 | End: 2018-02-03 | Stop reason: HOSPADM

## 2018-01-01 RX ORDER — ASPIRIN 81 MG/1
162 TABLET ORAL DAILY
Status: DISCONTINUED | OUTPATIENT
Start: 2018-01-01 | End: 2018-01-01

## 2018-01-01 RX ORDER — CIPROFLOXACIN 2 MG/ML
400 INJECTION, SOLUTION INTRAVENOUS EVERY 24 HOURS
Status: DISCONTINUED | OUTPATIENT
Start: 2018-02-03 | End: 2018-02-03 | Stop reason: HOSPADM

## 2018-01-01 RX ORDER — CALCIUM CHLORIDE 100 MG/ML
1 INJECTION INTRAVENOUS; INTRAVENTRICULAR ONCE
Status: DISCONTINUED | OUTPATIENT
Start: 2018-01-01 | End: 2018-01-01

## 2018-01-01 RX ORDER — PROPOFOL 10 MG/ML
INJECTION, EMULSION INTRAVENOUS PRN
Status: DISCONTINUED | OUTPATIENT
Start: 2018-01-01 | End: 2018-01-01

## 2018-01-01 RX ORDER — DEXTROSE MONOHYDRATE, SODIUM CHLORIDE, AND POTASSIUM CHLORIDE 50; 1.49; 9 G/1000ML; G/1000ML; G/1000ML
INJECTION, SOLUTION INTRAVENOUS CONTINUOUS
Status: DISCONTINUED | OUTPATIENT
Start: 2018-01-01 | End: 2018-01-01

## 2018-01-01 RX ORDER — METHYLPREDNISOLONE SODIUM SUCCINATE 125 MG/2ML
100 INJECTION, POWDER, LYOPHILIZED, FOR SOLUTION INTRAMUSCULAR; INTRAVENOUS ONCE
Status: COMPLETED | OUTPATIENT
Start: 2018-01-01 | End: 2018-01-01

## 2018-01-01 RX ORDER — FUROSEMIDE 10 MG/ML
40 INJECTION INTRAMUSCULAR; INTRAVENOUS EVERY 6 HOURS
Status: DISCONTINUED | OUTPATIENT
Start: 2018-01-01 | End: 2018-01-01

## 2018-01-01 RX ORDER — HYDROMORPHONE HYDROCHLORIDE 1 MG/ML
.3-.5 INJECTION, SOLUTION INTRAMUSCULAR; INTRAVENOUS; SUBCUTANEOUS
Status: DISCONTINUED | OUTPATIENT
Start: 2018-01-01 | End: 2018-02-03 | Stop reason: HOSPADM

## 2018-01-01 RX ORDER — PROPOFOL 10 MG/ML
5-75 INJECTION, EMULSION INTRAVENOUS CONTINUOUS
Status: DISCONTINUED | OUTPATIENT
Start: 2018-01-01 | End: 2018-02-03 | Stop reason: HOSPADM

## 2018-01-01 RX ORDER — PROPOFOL 10 MG/ML
INJECTION, EMULSION INTRAVENOUS
Status: DISPENSED
Start: 2018-01-01 | End: 2018-01-01

## 2018-01-01 RX ORDER — LIDOCAINE 40 MG/G
CREAM TOPICAL
Status: DISCONTINUED | OUTPATIENT
Start: 2018-01-01 | End: 2018-02-03 | Stop reason: HOSPADM

## 2018-01-01 RX ORDER — LOPERAMIDE HCL 2 MG
2 CAPSULE ORAL 4 TIMES DAILY PRN
Status: DISCONTINUED | OUTPATIENT
Start: 2018-01-01 | End: 2018-01-01

## 2018-01-01 RX ORDER — LOPERAMIDE HCL 2 MG
2 CAPSULE ORAL 3 TIMES DAILY PRN
Status: DISCONTINUED | OUTPATIENT
Start: 2018-01-01 | End: 2018-02-03 | Stop reason: HOSPADM

## 2018-01-01 RX ADMIN — Medication 8 MG/HR: at 12:08

## 2018-01-01 RX ADMIN — FAMOTIDINE 20 MG: 10 INJECTION, SOLUTION INTRAVENOUS at 20:15

## 2018-01-01 RX ADMIN — ALBUTEROL SULFATE 2.5 MG: 2.5 SOLUTION RESPIRATORY (INHALATION) at 15:37

## 2018-01-01 RX ADMIN — SODIUM CHLORIDE: 9 INJECTION, SOLUTION INTRAVENOUS at 08:56

## 2018-01-01 RX ADMIN — Medication 100 MCG/HR: at 22:01

## 2018-01-01 RX ADMIN — Medication 10 ML: at 18:04

## 2018-01-01 RX ADMIN — DEXTROSE MONOHYDRATE 50 ML: 500 INJECTION PARENTERAL at 10:43

## 2018-01-01 RX ADMIN — ALLOPURINOL 300 MG: 300 TABLET ORAL at 08:19

## 2018-01-01 RX ADMIN — METOPROLOL SUCCINATE 100 MG: 50 TABLET, EXTENDED RELEASE ORAL at 08:21

## 2018-01-01 RX ADMIN — RITUXIMAB 640 MG: 10 INJECTION, SOLUTION INTRAVENOUS at 09:32

## 2018-01-01 RX ADMIN — I.V. FAT EMULSION 250 ML: 20 EMULSION INTRAVENOUS at 20:12

## 2018-01-01 RX ADMIN — ASPIRIN 162 MG: 81 TABLET, COATED ORAL at 08:49

## 2018-01-01 RX ADMIN — PIPERACILLIN SODIUM AND TAZOBACTAM SODIUM 4.5 G: 36; 4.5 INJECTION, POWDER, FOR SOLUTION INTRAVENOUS at 01:21

## 2018-01-01 RX ADMIN — ASPIRIN 162 MG: 81 TABLET, COATED ORAL at 08:31

## 2018-01-01 RX ADMIN — PANTOPRAZOLE SODIUM 40 MG: 40 INJECTION, POWDER, FOR SOLUTION INTRAVENOUS at 23:14

## 2018-01-01 RX ADMIN — LIDOCAINE HYDROCHLORIDE 1 ML: 20 INJECTION, SOLUTION INFILTRATION; PERINEURAL at 20:55

## 2018-01-01 RX ADMIN — PROPOFOL 50 MG: 10 INJECTION, EMULSION INTRAVENOUS at 16:40

## 2018-01-01 RX ADMIN — TORSEMIDE 20 MG: 20 TABLET ORAL at 08:48

## 2018-01-01 RX ADMIN — CALCIUM CHLORIDE 1 G: 100 INJECTION, SOLUTION INTRAVENOUS at 14:35

## 2018-01-01 RX ADMIN — POTASSIUM CHLORIDE 40 MEQ: 750 TABLET, EXTENDED RELEASE ORAL at 08:52

## 2018-01-01 RX ADMIN — CALCIUM CHLORIDE 2 G: 100 INJECTION, SOLUTION INTRAVENOUS at 10:40

## 2018-01-01 RX ADMIN — METHYLPREDNISOLONE 100 MG: 125 INJECTION, POWDER, LYOPHILIZED, FOR SOLUTION INTRAMUSCULAR; INTRAVENOUS at 10:34

## 2018-01-01 RX ADMIN — PIPERACILLIN SODIUM AND TAZOBACTAM SODIUM 4.5 G: 36; 4.5 INJECTION, POWDER, FOR SOLUTION INTRAVENOUS at 06:30

## 2018-01-01 RX ADMIN — TRAMADOL HYDROCHLORIDE 25 MG: 50 TABLET, FILM COATED ORAL at 10:26

## 2018-01-01 RX ADMIN — VECURONIUM BROMIDE 10 MG: 1 INJECTION, POWDER, LYOPHILIZED, FOR SOLUTION INTRAVENOUS at 11:35

## 2018-01-01 RX ADMIN — FAMOTIDINE 20 MG: 10 INJECTION, SOLUTION INTRAVENOUS at 20:05

## 2018-01-01 RX ADMIN — SODIUM CHLORIDE 500 ML: 9 INJECTION, SOLUTION INTRAVENOUS at 23:22

## 2018-01-01 RX ADMIN — Medication 3 MG/HR: at 22:02

## 2018-01-01 RX ADMIN — METOPROLOL SUCCINATE 100 MG: 50 TABLET, EXTENDED RELEASE ORAL at 08:31

## 2018-01-01 RX ADMIN — ALLOPURINOL 300 MG: 300 TABLET ORAL at 08:33

## 2018-01-01 RX ADMIN — EPOPROSTENOL 20 NG/KG/MIN: 1.5 INJECTION, POWDER, LYOPHILIZED, FOR SOLUTION INTRAVENOUS at 01:11

## 2018-01-01 RX ADMIN — Medication 10 ML: at 08:49

## 2018-01-01 RX ADMIN — PIPERACILLIN SODIUM AND TAZOBACTAM SODIUM 2.25 G: 36; 4.5 INJECTION, POWDER, FOR SOLUTION INTRAVENOUS at 16:08

## 2018-01-01 RX ADMIN — HYDROCORTISONE SODIUM SUCCINATE 100 MG: 100 INJECTION, POWDER, FOR SOLUTION INTRAMUSCULAR; INTRAVENOUS at 06:19

## 2018-01-01 RX ADMIN — HYDROCORTISONE SODIUM SUCCINATE 100 MG: 100 INJECTION, POWDER, FOR SOLUTION INTRAMUSCULAR; INTRAVENOUS at 13:46

## 2018-01-01 RX ADMIN — POTASSIUM CHLORIDE, DEXTROSE MONOHYDRATE AND SODIUM CHLORIDE: 150; 5; 900 INJECTION, SOLUTION INTRAVENOUS at 01:39

## 2018-01-01 RX ADMIN — FAMOTIDINE 20 MG: 10 INJECTION, SOLUTION INTRAVENOUS at 19:40

## 2018-01-01 RX ADMIN — I.V. FAT EMULSION 250 ML: 20 EMULSION INTRAVENOUS at 20:27

## 2018-01-01 RX ADMIN — PROCHLORPERAZINE EDISYLATE 10 MG: 5 INJECTION INTRAMUSCULAR; INTRAVENOUS at 14:59

## 2018-01-01 RX ADMIN — ACETAMINOPHEN 650 MG: 325 TABLET, FILM COATED ORAL at 08:28

## 2018-01-01 RX ADMIN — ASCORBIC ACID 1500 MG: 500 INJECTION, SOLUTION INTRAMUSCULAR; INTRAVENOUS; SUBCUTANEOUS at 16:18

## 2018-01-01 RX ADMIN — FUROSEMIDE 40 MG: 10 INJECTION, SOLUTION INTRAVENOUS at 15:36

## 2018-01-01 RX ADMIN — SODIUM BICARBONATE: 84 INJECTION, SOLUTION INTRAVENOUS at 15:29

## 2018-01-01 RX ADMIN — HUMAN INSULIN 5 UNITS: 100 INJECTION, SOLUTION SUBCUTANEOUS at 10:46

## 2018-01-01 RX ADMIN — ALBUMIN HUMAN 50 G: 0.25 SOLUTION INTRAVENOUS at 21:49

## 2018-01-01 RX ADMIN — Medication 5 ML: at 09:20

## 2018-01-01 RX ADMIN — Medication 12.5 ML/KG/HR: at 13:50

## 2018-01-01 RX ADMIN — PREDNISOLONE ACETATE 1 DROP: 10 SUSPENSION/ DROPS OPHTHALMIC at 08:49

## 2018-01-01 RX ADMIN — HYDRALAZINE HYDROCHLORIDE 10 MG: 10 TABLET, FILM COATED ORAL at 14:25

## 2018-01-01 RX ADMIN — METOPROLOL SUCCINATE 100 MG: 50 TABLET, EXTENDED RELEASE ORAL at 08:25

## 2018-01-01 RX ADMIN — ALLOPURINOL 300 MG: 300 TABLET ORAL at 08:24

## 2018-01-01 RX ADMIN — TOBRAMYCIN SULFATE 200 MG: 40 INJECTION, SOLUTION INTRAMUSCULAR; INTRAVENOUS at 11:38

## 2018-01-01 RX ADMIN — Medication 10 ML: at 20:18

## 2018-01-01 RX ADMIN — SODIUM BICARBONATE: 84 INJECTION, SOLUTION INTRAVENOUS at 11:14

## 2018-01-01 RX ADMIN — SODIUM CHLORIDE 6 MG: 9 INJECTION, SOLUTION INTRAVENOUS at 16:59

## 2018-01-01 RX ADMIN — Medication 5 ML: at 11:57

## 2018-01-01 RX ADMIN — SODIUM BICARBONATE: 84 INJECTION, SOLUTION INTRAVENOUS at 12:00

## 2018-01-01 RX ADMIN — DEXTROSE MONOHYDRATE 25 ML: 500 INJECTION PARENTERAL at 07:11

## 2018-01-01 RX ADMIN — METOPROLOL SUCCINATE 100 MG: 50 TABLET, EXTENDED RELEASE ORAL at 08:53

## 2018-01-01 RX ADMIN — VASOPRESSIN 3 UNITS/HR: 20 INJECTION, SOLUTION INTRAMUSCULAR; SUBCUTANEOUS at 21:20

## 2018-01-01 RX ADMIN — Medication 10 ML: at 08:24

## 2018-01-01 RX ADMIN — PHENYLEPHRINE HYDROCHLORIDE 3 MCG/KG/MIN: 10 INJECTION, SOLUTION INTRAMUSCULAR; INTRAVENOUS; SUBCUTANEOUS at 05:39

## 2018-01-01 RX ADMIN — ALLOPURINOL 300 MG: 300 TABLET ORAL at 08:30

## 2018-01-01 RX ADMIN — ASPIRIN 162 MG: 81 TABLET, COATED ORAL at 08:52

## 2018-01-01 RX ADMIN — POTASSIUM CHLORIDE, DEXTROSE MONOHYDRATE AND SODIUM CHLORIDE: 150; 5; 900 INJECTION, SOLUTION INTRAVENOUS at 17:05

## 2018-01-01 RX ADMIN — Medication 5 ML: at 08:44

## 2018-01-01 RX ADMIN — SODIUM CHLORIDE: 9 INJECTION, SOLUTION INTRAVENOUS at 16:56

## 2018-01-01 RX ADMIN — VANCOMYCIN HYDROCHLORIDE 1500 MG: 10 INJECTION, POWDER, LYOPHILIZED, FOR SOLUTION INTRAVENOUS at 23:57

## 2018-01-01 RX ADMIN — SODIUM CHLORIDE: 9 INJECTION, SOLUTION INTRAVENOUS at 17:09

## 2018-01-01 RX ADMIN — FUROSEMIDE 40 MG: 10 INJECTION, SOLUTION INTRAVENOUS at 14:02

## 2018-01-01 RX ADMIN — ONDANSETRON HYDROCHLORIDE 8 MG: 8 TABLET, FILM COATED ORAL at 08:32

## 2018-01-01 RX ADMIN — SODIUM BICARBONATE 50 MEQ: 84 INJECTION INTRAVENOUS at 09:45

## 2018-01-01 RX ADMIN — ONDANSETRON HYDROCHLORIDE 16 MG: 8 TABLET, FILM COATED ORAL at 17:50

## 2018-01-01 RX ADMIN — ASPIRIN 81 MG: 81 TABLET, COATED ORAL at 09:19

## 2018-01-01 RX ADMIN — LOPERAMIDE HYDROCHLORIDE 2 MG: 2 CAPSULE ORAL at 22:57

## 2018-01-01 RX ADMIN — FLUDEOXYGLUCOSE F-18 10.21 MCI.: 500 INJECTION, SOLUTION INTRAVENOUS at 10:45

## 2018-01-01 RX ADMIN — ROCURONIUM BROMIDE 50 MG: 10 INJECTION INTRAVENOUS at 16:40

## 2018-01-01 RX ADMIN — Medication 10 ML: at 08:35

## 2018-01-01 RX ADMIN — PREDNISOLONE ACETATE 1 DROP: 10 SUSPENSION/ DROPS OPHTHALMIC at 12:04

## 2018-01-01 RX ADMIN — SODIUM CHLORIDE 500 ML: 9 INJECTION, SOLUTION INTRAVENOUS at 10:33

## 2018-01-01 RX ADMIN — MORPHINE SULFATE 2 MG: 2 INJECTION, SOLUTION INTRAMUSCULAR; INTRAVENOUS at 15:40

## 2018-01-01 RX ADMIN — DIPHENHYDRAMINE HYDROCHLORIDE 50 MG: 50 CAPSULE ORAL at 16:00

## 2018-01-01 RX ADMIN — METOPROLOL SUCCINATE 100 MG: 50 TABLET, EXTENDED RELEASE ORAL at 09:19

## 2018-01-01 RX ADMIN — VANCOMYCIN HYDROCHLORIDE 1250 MG: 10 INJECTION, POWDER, LYOPHILIZED, FOR SOLUTION INTRAVENOUS at 09:04

## 2018-01-01 RX ADMIN — PROCHLORPERAZINE EDISYLATE 5 MG: 5 INJECTION INTRAMUSCULAR; INTRAVENOUS at 01:38

## 2018-01-01 RX ADMIN — TORSEMIDE 20 MG: 20 TABLET ORAL at 14:44

## 2018-01-01 RX ADMIN — HYDROCORTISONE SODIUM SUCCINATE 100 MG: 100 INJECTION, POWDER, FOR SOLUTION INTRAMUSCULAR; INTRAVENOUS at 21:53

## 2018-01-01 RX ADMIN — METHYLPREDNISOLONE 100 MG: 125 INJECTION, POWDER, LYOPHILIZED, FOR SOLUTION INTRAMUSCULAR; INTRAVENOUS at 11:18

## 2018-01-01 RX ADMIN — ALBUMIN HUMAN 50 G: 0.05 INJECTION, SOLUTION INTRAVENOUS at 14:32

## 2018-01-01 RX ADMIN — Medication 1000 ML: at 06:29

## 2018-01-01 RX ADMIN — DEXTROSE MONOHYDRATE 50 ML: 500 INJECTION PARENTERAL at 10:06

## 2018-01-01 RX ADMIN — SODIUM CHLORIDE: 9 INJECTION, SOLUTION INTRAVENOUS at 08:54

## 2018-01-01 RX ADMIN — PHYTONADIONE: 1 INJECTION, EMULSION INTRAMUSCULAR; INTRAVENOUS; SUBCUTANEOUS at 20:24

## 2018-01-01 RX ADMIN — CALCIUM CHLORIDE 2 G: 100 INJECTION INTRAVENOUS; INTRAVENTRICULAR at 18:06

## 2018-01-01 RX ADMIN — METOPROLOL SUCCINATE 100 MG: 50 TABLET, EXTENDED RELEASE ORAL at 08:48

## 2018-01-01 RX ADMIN — ENOXAPARIN SODIUM 40 MG: 40 INJECTION SUBCUTANEOUS at 17:40

## 2018-01-01 RX ADMIN — DEXAMETHASONE 40 MG: 4 TABLET ORAL at 16:50

## 2018-01-01 RX ADMIN — ASPIRIN 162 MG: 81 TABLET, COATED ORAL at 08:23

## 2018-01-01 RX ADMIN — SODIUM CHLORIDE: 9 INJECTION, SOLUTION INTRAVENOUS at 05:41

## 2018-01-01 RX ADMIN — SODIUM CHLORIDE: 9 INJECTION, SOLUTION INTRAVENOUS at 00:52

## 2018-01-01 RX ADMIN — Medication 10 ML: at 20:39

## 2018-01-01 RX ADMIN — LORAZEPAM 0.5 MG: 2 INJECTION INTRAMUSCULAR; INTRAVENOUS at 15:38

## 2018-01-01 RX ADMIN — METHYLPREDNISOLONE 100 MG: 125 INJECTION, POWDER, LYOPHILIZED, FOR SOLUTION INTRAMUSCULAR; INTRAVENOUS at 17:10

## 2018-01-01 RX ADMIN — DEXAMETHASONE 40 MG: 4 TABLET ORAL at 08:49

## 2018-01-01 RX ADMIN — SODIUM CHLORIDE 1000 ML: 9 INJECTION, SOLUTION INTRAVENOUS at 06:29

## 2018-01-01 RX ADMIN — Medication 100 MCG/HR: at 12:05

## 2018-01-01 RX ADMIN — ASPIRIN 162 MG: 81 TABLET, COATED ORAL at 08:32

## 2018-01-01 RX ADMIN — Medication 10 ML: at 16:50

## 2018-01-01 RX ADMIN — VASOPRESSIN 4 UNITS/HR: 20 INJECTION, SOLUTION INTRAMUSCULAR; SUBCUTANEOUS at 02:20

## 2018-01-01 RX ADMIN — CIPROFLOXACIN 400 MG: 2 INJECTION, SOLUTION INTRAVENOUS at 02:05

## 2018-01-01 RX ADMIN — PROPOFOL 20 MCG/KG/MIN: 10 INJECTION, EMULSION INTRAVENOUS at 17:20

## 2018-01-01 RX ADMIN — METOPROLOL SUCCINATE 100 MG: 50 TABLET, EXTENDED RELEASE ORAL at 08:24

## 2018-01-01 RX ADMIN — METOPROLOL SUCCINATE 100 MG: 50 TABLET, EXTENDED RELEASE ORAL at 08:33

## 2018-01-01 RX ADMIN — HUMAN ALBUMIN MICROSPHERES AND PERFLUTREN 6 ML: 10; .22 INJECTION, SOLUTION INTRAVENOUS at 15:45

## 2018-01-01 RX ADMIN — PREDNISOLONE ACETATE 1 DROP: 10 SUSPENSION/ DROPS OPHTHALMIC at 08:32

## 2018-01-01 RX ADMIN — ASCORBIC ACID 1500 MG: 500 INJECTION, SOLUTION INTRAMUSCULAR; INTRAVENOUS; SUBCUTANEOUS at 13:00

## 2018-01-01 RX ADMIN — SODIUM CHLORIDE, PRESERVATIVE FREE 5 ML: 5 INJECTION INTRAVENOUS at 00:06

## 2018-01-01 RX ADMIN — PIPERACILLIN SODIUM AND TAZOBACTAM SODIUM 4.5 G: 36; 4.5 INJECTION, POWDER, FOR SOLUTION INTRAVENOUS at 20:07

## 2018-01-01 RX ADMIN — EPOPROSTENOL 20 NG/KG/MIN: 1.5 INJECTION, POWDER, LYOPHILIZED, FOR SOLUTION INTRAVENOUS at 19:44

## 2018-01-01 RX ADMIN — PHYTONADIONE: 1 INJECTION, EMULSION INTRAMUSCULAR; INTRAVENOUS; SUBCUTANEOUS at 19:40

## 2018-01-01 RX ADMIN — ASPIRIN 161 MG: 81 TABLET, COATED ORAL at 08:25

## 2018-01-01 RX ADMIN — Medication: at 13:50

## 2018-01-01 RX ADMIN — I.V. FAT EMULSION 250 ML: 20 EMULSION INTRAVENOUS at 20:24

## 2018-01-01 RX ADMIN — FAMOTIDINE 20 MG: 10 INJECTION, SOLUTION INTRAVENOUS at 20:24

## 2018-01-01 RX ADMIN — PHYTONADIONE 10 MG: 10 INJECTION, EMULSION INTRAMUSCULAR; INTRAVENOUS; SUBCUTANEOUS at 11:07

## 2018-01-01 RX ADMIN — PHYTONADIONE: 1 INJECTION, EMULSION INTRAMUSCULAR; INTRAVENOUS; SUBCUTANEOUS at 20:27

## 2018-01-01 RX ADMIN — SENNOSIDES 2 TABLET: 8.6 TABLET, FILM COATED ORAL at 12:39

## 2018-01-01 RX ADMIN — ALLOPURINOL 300 MG: 300 TABLET ORAL at 08:48

## 2018-01-01 RX ADMIN — PHYTONADIONE: 1 INJECTION, EMULSION INTRAMUSCULAR; INTRAVENOUS; SUBCUTANEOUS at 21:52

## 2018-01-01 RX ADMIN — SODIUM BICARBONATE 15 ML/HR: 84 INJECTION, SOLUTION INTRAVENOUS at 14:00

## 2018-01-01 RX ADMIN — FAMOTIDINE 20 MG: 10 INJECTION, SOLUTION INTRAVENOUS at 20:13

## 2018-01-01 RX ADMIN — PHYTONADIONE: 1 INJECTION, EMULSION INTRAMUSCULAR; INTRAVENOUS; SUBCUTANEOUS at 20:13

## 2018-01-01 RX ADMIN — NOREPINEPHRINE BITARTRATE 0.3 MCG/KG/MIN: 1 INJECTION INTRAVENOUS at 10:22

## 2018-01-01 RX ADMIN — ALLOPURINOL 300 MG: 300 TABLET ORAL at 09:20

## 2018-01-01 RX ADMIN — DEXTROSE MONOHYDRATE: 100 INJECTION, SOLUTION INTRAVENOUS at 08:08

## 2018-01-01 RX ADMIN — DEXAMETHASONE 40 MG: 4 TABLET ORAL at 08:30

## 2018-01-01 RX ADMIN — ONDANSETRON HYDROCHLORIDE 8 MG: 8 TABLET, FILM COATED ORAL at 20:15

## 2018-01-01 RX ADMIN — HUMAN ALBUMIN MICROSPHERES AND PERFLUTREN 6 ML: 10; .22 INJECTION, SOLUTION INTRAVENOUS at 01:30

## 2018-01-01 RX ADMIN — LOPERAMIDE HYDROCHLORIDE 2 MG: 2 CAPSULE ORAL at 16:00

## 2018-01-01 RX ADMIN — ASPIRIN 162 MG: 81 TABLET, COATED ORAL at 08:17

## 2018-01-01 RX ADMIN — SODIUM CHLORIDE: 9 INJECTION, SOLUTION INTRAVENOUS at 21:17

## 2018-01-01 RX ADMIN — I.V. FAT EMULSION 250 ML: 20 EMULSION INTRAVENOUS at 20:05

## 2018-01-01 RX ADMIN — Medication 10 ML: at 20:14

## 2018-01-01 RX ADMIN — PREDNISOLONE ACETATE 1 DROP: 10 SUSPENSION/ DROPS OPHTHALMIC at 20:18

## 2018-01-01 RX ADMIN — DEXTROSE MONOHYDRATE 50 ML: 500 INJECTION PARENTERAL at 05:54

## 2018-01-01 RX ADMIN — PANTOPRAZOLE SODIUM 40 MG: 40 INJECTION, POWDER, FOR SOLUTION INTRAVENOUS at 11:46

## 2018-01-01 RX ADMIN — MORPHINE SULFATE 2 MG: 2 INJECTION, SOLUTION INTRAMUSCULAR; INTRAVENOUS at 16:33

## 2018-01-01 RX ADMIN — NOREPINEPHRINE BITARTRATE 0.03 MCG/KG/MIN: 1 INJECTION INTRAVENOUS at 18:10

## 2018-01-01 RX ADMIN — LOPERAMIDE HYDROCHLORIDE 2 MG: 2 CAPSULE ORAL at 10:34

## 2018-01-01 RX ADMIN — ALLOPURINOL 300 MG: 300 TABLET ORAL at 11:45

## 2018-01-01 RX ADMIN — DIPHENHYDRAMINE HYDROCHLORIDE 50 MG: 50 CAPSULE ORAL at 08:28

## 2018-01-01 RX ADMIN — ACETAMINOPHEN 650 MG: 325 TABLET, FILM COATED ORAL at 11:45

## 2018-01-01 RX ADMIN — HUMAN INSULIN 3 UNITS/HR: 100 INJECTION, SOLUTION SUBCUTANEOUS at 02:07

## 2018-01-01 RX ADMIN — THIAMINE HYDROCHLORIDE 200 MG: 100 INJECTION, SOLUTION INTRAMUSCULAR; INTRAVENOUS at 13:35

## 2018-01-01 RX ADMIN — PREDNISOLONE ACETATE 1 DROP: 10 SUSPENSION/ DROPS OPHTHALMIC at 12:36

## 2018-01-01 RX ADMIN — FAMOTIDINE 20 MG: 10 INJECTION, SOLUTION INTRAVENOUS at 20:30

## 2018-01-01 RX ADMIN — LOPERAMIDE HYDROCHLORIDE 2 MG: 2 CAPSULE ORAL at 17:59

## 2018-01-01 RX ADMIN — DEXTROSE 1720 MG: 5 SOLUTION INTRAVENOUS at 21:17

## 2018-01-01 RX ADMIN — ACETAMINOPHEN 650 MG: 325 TABLET, FILM COATED ORAL at 16:00

## 2018-01-01 RX ADMIN — MICAFUNGIN SODIUM 100 MG: 10 INJECTION, POWDER, LYOPHILIZED, FOR SOLUTION INTRAVENOUS at 03:41

## 2018-01-01 RX ADMIN — LORAZEPAM 0.5 MG: 2 INJECTION INTRAMUSCULAR; INTRAVENOUS at 16:33

## 2018-01-01 RX ADMIN — PHENYLEPHRINE HYDROCHLORIDE 0.5 MCG/KG/MIN: 10 INJECTION, SOLUTION INTRAMUSCULAR; INTRAVENOUS; SUBCUTANEOUS at 21:21

## 2018-01-01 RX ADMIN — EPINEPHRINE 0.05 MCG/KG/MIN: 1 INJECTION PARENTERAL at 09:30

## 2018-01-01 RX ADMIN — Medication 2 G: at 11:42

## 2018-01-01 RX ADMIN — SODIUM BICARBONATE 100 MEQ: 84 INJECTION INTRAVENOUS at 13:21

## 2018-01-01 RX ADMIN — PHYTONADIONE: 1 INJECTION, EMULSION INTRAMUSCULAR; INTRAVENOUS; SUBCUTANEOUS at 20:05

## 2018-01-01 RX ADMIN — ALLOPURINOL 300 MG: 300 TABLET ORAL at 08:52

## 2018-01-01 RX ADMIN — METHYLPREDNISOLONE 100 MG: 125 INJECTION, POWDER, LYOPHILIZED, FOR SOLUTION INTRAMUSCULAR; INTRAVENOUS at 08:32

## 2018-01-01 RX ADMIN — PREDNISOLONE ACETATE 1 DROP: 10 SUSPENSION/ DROPS OPHTHALMIC at 16:41

## 2018-01-01 RX ADMIN — LISINOPRIL 10 MG: 10 TABLET ORAL at 12:39

## 2018-01-01 RX ADMIN — DEXTROSE MONOHYDRATE 3 MCG/KG/MIN: 50 INJECTION, SOLUTION INTRAVENOUS at 11:30

## 2018-01-01 RX ADMIN — ALLOPURINOL 300 MG: 300 TABLET ORAL at 08:26

## 2018-01-01 RX ADMIN — OXALIPLATIN 172 MG: 5 INJECTION, SOLUTION, CONCENTRATE INTRAVENOUS at 18:25

## 2018-01-01 RX ADMIN — ENOXAPARIN SODIUM 40 MG: 40 INJECTION SUBCUTANEOUS at 17:04

## 2018-01-01 RX ADMIN — I.V. FAT EMULSION 250 ML: 20 EMULSION INTRAVENOUS at 19:40

## 2018-01-01 RX ADMIN — PREDNISOLONE ACETATE 1 DROP: 10 SUSPENSION/ DROPS OPHTHALMIC at 14:02

## 2018-01-01 RX ADMIN — EPOPROSTENOL 20 NG/KG/MIN: 1.5 INJECTION, POWDER, LYOPHILIZED, FOR SOLUTION INTRAVENOUS at 13:37

## 2018-01-01 RX ADMIN — DEXTROSE 1720 MG: 5 SOLUTION INTRAVENOUS at 09:12

## 2018-01-01 RX ADMIN — ALBUMIN HUMAN 25 G: 0.25 SOLUTION INTRAVENOUS at 06:33

## 2018-01-01 RX ADMIN — PREDNISOLONE ACETATE 1 DROP: 10 SUSPENSION/ DROPS OPHTHALMIC at 16:10

## 2018-01-01 RX ADMIN — Medication 10 ML: at 08:26

## 2018-01-01 RX ADMIN — ENOXAPARIN SODIUM 40 MG: 40 INJECTION SUBCUTANEOUS at 17:59

## 2018-01-01 RX ADMIN — FUROSEMIDE 40 MG: 10 INJECTION, SOLUTION INTRAVENOUS at 20:05

## 2018-01-01 RX ADMIN — SODIUM CHLORIDE 1000 ML: 9 INJECTION, SOLUTION INTRAVENOUS at 09:10

## 2018-01-01 RX ADMIN — PROCHLORPERAZINE MALEATE 5 MG: 5 TABLET, FILM COATED ORAL at 05:51

## 2018-01-01 RX ADMIN — HUMAN INSULIN 4 UNITS/HR: 100 INJECTION, SOLUTION SUBCUTANEOUS at 22:31

## 2018-01-01 RX ADMIN — METHYLPREDNISOLONE 100 MG: 125 INJECTION, POWDER, LYOPHILIZED, FOR SOLUTION INTRAMUSCULAR; INTRAVENOUS at 12:00

## 2018-01-01 RX ADMIN — FILGRASTIM 300 MCG: 300 INJECTION, SOLUTION INTRAVENOUS; SUBCUTANEOUS at 01:26

## 2018-01-01 RX ADMIN — Medication 10 ML: at 16:01

## 2018-01-01 ASSESSMENT — PAIN DESCRIPTION - DESCRIPTORS
DESCRIPTORS: DISCOMFORT
DESCRIPTORS: CRAMPING
DESCRIPTORS: CRAMPING
DESCRIPTORS: SORE
DESCRIPTORS: DISCOMFORT
DESCRIPTORS: SORE
DESCRIPTORS: SORE
DESCRIPTORS: DISCOMFORT
DESCRIPTORS: CRAMPING
DESCRIPTORS: DISCOMFORT

## 2018-01-01 ASSESSMENT — PAIN SCALES - GENERAL: PAINLEVEL: EXTREME PAIN (8)

## 2018-01-01 ASSESSMENT — ACTIVITIES OF DAILY LIVING (ADL)
RETIRED_EATING: 0-->INDEPENDENT
SWALLOWING: 0-->SWALLOWS FOODS/LIQUIDS WITHOUT DIFFICULTY
DRESS: 0-->INDEPENDENT
TRANSFERRING: 0-->INDEPENDENT
TOILETING: 0-->INDEPENDENT
FALL_HISTORY_WITHIN_LAST_SIX_MONTHS: NO
BATHING: 0-->INDEPENDENT
COGNITION: 0 - NO COGNITION ISSUES REPORTED
AMBULATION: 0-->INDEPENDENT
RETIRED_COMMUNICATION: 0-->UNDERSTANDS/COMMUNICATES WITHOUT DIFFICULTY

## 2018-01-17 NOTE — PROGRESS NOTES
Dr. Ernandez notified (paged) that [facility] imaging was pushed to PACS at 1040 today as per her request.

## 2018-01-17 NOTE — TELEPHONE ENCOUNTER
Per the request of Dr. French, patient is scheduled for [facility] colonoscopy this Friday 1/19/18 at 8:30 am (7:30 am arrival).  Confirmed date, time, and arrival time with patient.  Francesca Baron RN is contacting patient to go over bowel preparation.  Dr. French updated.

## 2018-01-17 NOTE — PROGRESS NOTES
This RN called the patient to discuss his colonoscopy prep for his procedure on 1/19/2018. This RN explained the prep to the patients stated satisfaction. This RN also got his e-mail address to have a copy sent to his e-mail. Patient stated understanding of this new plan. Patient also verbalized that he would be to the hospital [facility] at 0730 on Friday 1/19/2018.

## 2018-01-18 NOTE — TELEPHONE ENCOUNTER
Per task, I called the patient and relayed his surgery date information including time, date, location and arrival time. I also spoke to him about the need for a pre-op and he said that he should be covered under a visit with Dr. Tommy Villalba at [facility]. I let him know that I would look into it and if I have issues I would contact him about it. He requested we send his packet to his wife's e-mail which is [email or web address].

## 2018-01-18 NOTE — TELEPHONE ENCOUNTER
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 Felton,    Can we add this case to 01/23/2018 as an overbook? It would be your  fifth case that day and you already have five cases scheduled for Monday. Please let me know and I will schedule accordingly.    Thank you,  Ely   ----- Message -----     From: Tito Felton MD     Sent: 1/17/2018   5:17 PM       To: Jey Vang MD, Cristofer French MD, #    Molina  When were you thinking about doing the colonoscopy?    I could try and join to do tandem EUS and upper endoscopy; I would need at least deep sedation and could potentially do it tomorrow or Friday (if before the afternoon Friday)    Daisy would you be able to request an over read by our docs here with query of better describing the mass as its so ill formed  Thanks  Tito  ----- Message -----     From: Daisy Ernandez MD     Sent: 1/17/2018   4:31 PM       To: Jey Vang MD, Cristofer French MD, #    Thanks for help to get this patient for abd mass biopsy.  It looks like he will need both colonoscopy and EBUS of pancreatic mass. Please see if we can coordinate. I will call the patient now about the plan and that somebody will connect with him soon.     We need to send sample to flow cytometry, molecular and path - this could be lymphoma.   Thanks !!    Daisy

## 2018-01-18 NOTE — TELEPHONE ENCOUNTER
Patient scheduled for colonoscopy    Indication for procedure. Screening, abdominal distention    Referring Provider.     ? no    Arrival time verified? yes    Facility location verified? [address] [facility], room 1-301    Instructions given regarding prep and procedure    Prep Type Miralax prep    Are you taking any anticoagulants or blood thinners? no    Instructions given? Yes, by Dr. French's office    Electronic implanted devices? no    Pre procedure teaching completed? Yes    Transportation from procedure? no    H&P / Pre op physical completed? na    Amairani Garcia RN

## 2018-01-18 NOTE — TELEPHONE ENCOUNTER
Dr. Villalba's RNCC called and let me know that the patient has not had an H&P at [facility].

## 2018-01-19 NOTE — IP AVS SNAPSHOT
[facility], [place], Endoscopy    [address] [address] [address] [address]    Phone:  [phone]                                       After Visit Summary   1/19/2018    Tristen Beckwith    MRN: [id number]           After Visit Summary Signature Page     I have received my discharge instructions, and my questions have been answered. I have discussed any challenges I see with this plan with the nurse or doctor.    ..........................................................................................................................................  Patient/Patient Representative Signature      ..........................................................................................................................................  Patient Representative Print Name and Relationship to Patient    ..................................................               ................................................  Date                                            Time    ..........................................................................................................................................  Reviewed by Signature/Title    ...................................................              ..............................................  Date                                                            Time

## 2018-01-19 NOTE — DISCHARGE INSTRUCTIONS
Discharge Instructions after Colonoscopy with biopsy by Dr French    Activity and Diet  You were given medicine for pain. You may be dizzy or sleepy.  For 24 hours:    Do not drive or use heavy equipment.    Do not make important decisions.    Do not drink any alcohol.  You may return to your normal diet and medicines.    Discomfort    Air was placed in your colon during the exam in order to see it. Walking helps to pass the air.    You may take Tylenol (acetaminophen) for pain unless your doctor has told you not to.    Follow-up  _X___ We took small tissue samples to study. Your doctor will send you the results  within about two weeks.    When to call:    Call right away if you have:    Unusual pain in belly or chest pain not relieved with passing air.    More than 1 to 2 Tablespoons of bleeding from your rectum.    Fever above 100.6  F (37.5  C).    If you have severe pain, bleeding, or shortness of breath, go to an emergency room.    If you have questions, call:  Monday to Friday, 7 a.m. to 4:30 p.m.  Endoscopy: [phone] (We may have to call you back)    After hours  Hospital: [phone] (Ask for the GI fellow on call)

## 2018-01-19 NOTE — IP AVS SNAPSHOT
MRN:[id number]                      After Visit Summary   1/19/2018    Tristen Beckwith    MRN: [id number]           Thank you!     Thank you for choosing [facility] for your care. Our goal is always to provide you with excellent care. Hearing back from our patients is one way we can continue to improve our services. Please take a few minutes to complete the written survey that you may receive in the mail after you visit with us. Thank you!        Patient Information     Date Of Birth          1945        About your hospital stay     You were admitted on:  January 19, 2018 You last received care in the:  [facility], Endoscopy    You were discharged on:  January 19, 2018       Who to Call     For medical emergencies, please call 911.  For non-urgent questions about your medical care, please call your primary care provider or clinic, [phone]  For questions related to your surgery, please call your surgery clinic        Attending Provider     Provider Specialty    Cristofer French MD Colon and Rectal Surgery       Primary Care Provider Office Phone # Fax #    Daisy Ernandez [phone]      Your next 10 appointments already scheduled     Jan 22, 2018  4:30 PM CST   (Arrive by 4:15 PM)   PAC EVALUATION with Uc Pac Keiry 9   [facility] Preoperative Assessment [place] ([facility])    [address] [place] [id number] [phone]            Jan 22, 2018  4:40 PM CST   (Arrive by 4:25 PM)   PAC Anesthesia Consult with Mario Pac Anesthesiologist   [facility] Preoperative Assessment [place] ([facility] [place])    [address] [place] [id number] [phone]            Jan 22, 2018  5:30 PM CST   (Arrive by 5:15 PM)   PAC RN ASSESSMENT with Uc Pac Rn   [facility] Preoperative Assessment [place] ([facility])    [address] [place] [id number]    [phone]            Jan 23, 2018   Procedure with Tito Felton MD   [facility], [place], Same Day Surgery (--)    [address]  [place] [id number] [phone]            May 09, 2018  9:15 AM CDT   [facility] Draw with  [facility] LAB DRAW   [facility] Lab Draw ([facility])    [address]  [address]  [place] [facility] [address] [phone]            May 09, 2018  9:45 AM CDT   RETURN ONC with Daisy Ernandez MD   [facility] Blood and Marrow Transplant ([facility])    [address]  [address]  [place] [address] [phone]              Further instructions from your care team       Discharge Instructions after Colonoscopy with biopsy by Dr French    Activity and Diet  You were given medicine for pain. You may be dizzy or sleepy.  For 24 hours:    Do not drive or use heavy equipment.    Do not make important decisions.    Do not drink any alcohol.  You may return to your normal diet and medicines.    Discomfort    Air was placed in your colon during the exam in order to see it. Walking helps to pass the air.    You may take Tylenol (acetaminophen) for pain unless your doctor has told you not to.    Follow-up  _X___ We took small tissue samples to study. Your doctor will send you the results  within about two weeks.    When to call:    Call right away if you have:    Unusual pain in belly or chest pain not relieved with passing air.    More than 1 to 2 Tablespoons of bleeding from your rectum.    Fever above 100.6  F (37.5  C).    If you have severe pain, bleeding, or shortness of breath, go to an emergency room.    If you have questions, call:  Monday to Friday, 7 a.m. to 4:30 p.m.  Endoscopy: [phone] (We may have to call you back)    After hours  Hospital: [phone] (Ask for the GI fellow on call)    Pending Results     Date and Time Order Name Status Description    1/19/2018 0920 Leukemia Lymphoma Evaluation  "(Flow Cytometry) In process             Admission Information     Date & Time Provider Department Dept. Phone    2018 Cristofer French MD [facility], [place], Endoscopy [phone]      Your Vitals Were     Blood Pressure Respirations Pulse Oximetry             134/79 14 99%         MyChart Information     [employer] lets you send messages to your doctor, view your test results, renew your prescriptions, schedule appointments and more. To sign up, go to [email or web address] . Click on \"Log in\" on the left side of the screen, which will take you to the Welcome page. Then click on \"Sign up Now\" on the right side of the page.     You will be asked to enter the access code listed below, as well as some personal information. Please follow the directions to create your username and password.     Your access code is: [email or web address]  Expires: 2018  6:30 AM     Your access code will  in 90 days. If you need help or a new code, please call your [place] clinic or [phone].        Care EveryWhere ID     This is your Care EveryWhere ID. This could be used by other organizations to access your [place] medical records  [id number]        Equal Access to Services     TAHIRA QURESHI AH: Radha florentino Soena, waaxda lusarathadaha, qaybta kaalmada adehernanyada, ruby souza. So [facility] [phone].    ATENCIÓN: Si habla español, tiene a curry disposición servicios gratuitos de asistencia lingüística. Llame al [phone].    We comply with applicable federal civil rights laws and [place] laws. We do not discriminate on the basis of race, color, national origin, age, disability, sex, sexual orientation, or gender identity.               Review of your medicines      UNREVIEWED medicines. Ask your doctor about these medicines        Dose / Directions    aspirin 325 MG tablet        Take 1/2 tablet daily   Refills:  0       lisinopril 40 MG tablet   Commonly known as:  PRINIVIL/ZESTRIL   Used for:  " Benign hypertension        Dose:  40 mg   Take 1 tablet by mouth daily.   Quantity:  90 tablet   Refills:  3       METOPROLOL SUCCINATE PO        Dose:  100 mg   Take 100 mg by mouth daily.   Refills:  0       SOY PROTEIN SHAKE Powd        Dose:  1 dose.   Take 1 dose by mouth as needed. Pt reports taking ~2 per week   Refills:  0       torsemide 20 MG tablet   Commonly known as:  DEMADEX        Dose:  20 mg   Take 20 mg by mouth daily   Refills:  0                Protect others around you: Learn how to safely use, store and throw away your medicines at www.disposemymeds.org.             Medication List: This is a list of all your medications and when to take them. Check marks below indicate your daily home schedule. Keep this list as a reference.      Medications           Morning Afternoon Evening Bedtime As Needed    aspirin 325 MG tablet   Take 1/2 tablet daily                                lisinopril 40 MG tablet   Commonly known as:  PRINIVIL/ZESTRIL   Take 1 tablet by mouth daily.                                METOPROLOL SUCCINATE PO   Take 100 mg by mouth daily.                                SOY PROTEIN SHAKE Powd   Take 1 dose by mouth as needed. Pt reports taking ~2 per week                                torsemide 20 MG tablet   Commonly known as:  DEMADEX   Take 20 mg by mouth daily

## 2018-01-19 NOTE — OR NURSING
Procedure: Colonoscopy with biopsies  Sedation: Conscious sedation  O2: 2 LPM NC, room air post  Tolerated: Well. VS stable during and post procedure. Not c/o abdominal or chest pain  Specimens: Handled by path, present during procedure  Report: Given to recovery RN  Pt to recovery area in stable condition, accompanied by RN    Rehana Quevedo RN

## 2018-01-22 NOTE — TELEPHONE ENCOUNTER
Call received from Pt's wife expressing concern over Pt's struggle to eat/drink stating he is getting weaker and weaker and is losing weight and requested to speak directly to Dr. Ernandez. Told her that the pt likely needs fluids and offered for Pt to come here to be seen/assessed by one of our PA at which time she stated that the Pt will not do that, is a doctor himself and is requested again to speak to Dr. Ernandez directly.     Paged Dr. Ernandez who called and stated that she was not available to speak to the pt and also recommend that he come in for labs/fluids/and to see a provider.     Called the Pt back and spoke to him directly who stated that his wife worries too much stating that he does not have symptoms of dehydration stating that he is not lightheaded or dizzy upon standing and is still urinating clear/yellow urine and drinking fluids as tolerated. Pt does report eating/drinking less then normal stating that he has increased epigastric pain if he eats too much but is still able to take in food/fluid at this time reporting some distention in his abdomen but reports having a small bowel movement yesterday and is not concerned at this time.     Pt states that he is anxious to hear the results of his bx taken during his colonoscopy on Friday and stated that they told him he may not need his upper scopy scheduled with Dr. Felton tomorrow based on the results. Pt also with que regarding his prep for tomorrow. Told Pt that I would reach out to endo and ask that they contact him regarding this procedure and pt verbalized understanding.

## 2018-01-22 NOTE — TELEPHONE ENCOUNTER
Called the patient per request and informed Dr. Beckwith that this upper endoscopy procedure was cancelled for tomorrow. Patient verbalized understanding and will await a call from Dr. French concerning the pathology results.

## 2018-01-23 NOTE — PROGRESS NOTES
RN Care Coordination Note  Pt was notified by phone by Dr. Ernandez that she needs to see him for clinic visit tomorrow morning. He was instructed by her to arrive at 8 am  Message to [facility] to set up lab and return visit with Dr. Krishna tomorrow, as well as EKG, ECHO and direct admit to [facility] 7D after per verbal orders from Dr. Ernandez.  Tianna Brown, RN, BSN, OCN  Care Coordinator  [facility]

## 2018-01-24 PROBLEM — C83.30 DLBCL (DIFFUSE LARGE B CELL LYMPHOMA) (H): Status: ACTIVE | Noted: 2018-01-01

## 2018-01-24 NOTE — CONSULTS
Nephrology consultation  Date of service: 2018  Attending physician: Dr. Ayala      Patient: Tristen Beckwith      : 1945      MRN: [id number]    Consult Question   BING,          History of Present Illness:   Tristen Beckwith is a 73 year old male with a medical history significant for follicular lymphoma in complete remission after 6 cycles bendamustine/rituxan by PET/CT and BMBx; as well as hypertension, coronary artery disease (anterior wall myocardial infarction in  now s/p angioplasty and stent placement) and ischemic cardiomyopathy (last LVEF 48%) with congestive heart failure (ACC stage C NYHA class III); admitted to the hospital with from Oncology clinic with concerns for relapse of DLBCL.    Patient is in the unit with his wife.    He states that over a week ago, he developed increased abdominal fullness, bloating and early satiety. These symptoms progressed with consequent reduction in his oral intake. He states that he has been unable to drink over 8 ounces of fluid in a day. He went into an Urgent care center and a CT abdomen (2018)was obtained given his symptoms. He followed up with his Oncologist who recommended a colonoscopy given CT findings. This was done on 2018. Since his colonoscopy, he state that he has had spurts of diarrhea, over 10 times per day, and this has gone on for a week. No vomiting, fever or chills have been noted. He takes torsemide daily (20 mg - 10  mg on alternate days, but states that he has taken 20 mg consecutively on a few occasions) and Lisinopril. He also takes aspirin 182 mg daily, but stopped this prior to his colonoscopy. He denies any other NSAID use. His last exposure to contrast was over a week ago. No recent antibiotic use. He denies any urinary symptoms at the moment. Review of his chart reveal a baseline creatinine of 1.3 - 1.6. No chest pain, no worsening shortness of breath or orthopnea has been noted. His weight was 63.8 kg a year ago.  He  was seen by his Oncologist today and was admitted given concerns of relapsed DLBCL as well as tumor lysis syndrome          Review of Symptoms:   10-point ROS reviewed, & found negative w/ exceptions noted in the HPI.          Past Medical History:     Past Medical History:   Diagnosis Date     Coronary artery disease      H/O percutaneous transluminal coronary angioplasty      History of acute anterior wall myocardial infarction        Past Surgical History:   Procedure Laterality Date     COLONOSCOPY N/A 1/19/2018    Procedure: COLONOSCOPY;  colonscopy;  Surgeon: Cristofer French MD;  Location:  GI            Allergies:     Allergies   Allergen Reactions     Cyclophosphamide Other (See Comments)     interstitial pneumonitis     Sulfa Drugs Rash             Outpatient Medications:       Current Facility-Administered Medications on File Prior to Encounter:  sodium chloride (PF) 0.9% PF flush 62 mL   iopamidol (ISOVUE-370) 76% solution 86 mL   barium sulfate (READI-CAT) oral suspension 2.1% 450 mL   fluorodeoxyglucose F-18 (FDG) radioisotope injection 10.23 mCi     Current Outpatient Prescriptions on File Prior to Encounter:  torsemide (DEMADEX) 20 MG tablet Take 20 mg by mouth daily   aspirin 325 MG tablet Take 1/2 tablet daily   Nutritional Supplements (SOY PROTEIN SHAKE) POWD Take 1 dose. by mouth as needed Every other day   METOPROLOL SUCCINATE PO Take 100 mg by mouth daily.     lisinopril (PRINIVIL,ZESTRIL) 40 MG tablet Take 1 tablet by mouth daily.             Family History:   Positive for HTN          Social History:     Social History     Social History     Marital status:      Spouse name: N/A     Number of children: N/A     Years of education: N/A     Occupational History     Not on file.     Social History Main Topics     Smoking status: Former Smoker     Smokeless tobacco: Never Used     Alcohol use Not on file     Drug use: Not on file     Sexual activity: Not on file     Other Topics Concern  "    Not on file     Social History Narrative             Physical Exam:   BP 92/56 (BP Location: Left arm)  Pulse 76  Temp 95.5  F (35.3  C) (Oral)  Resp 18  Ht 1.727 m (5' 8\")  Wt 59.8 kg (131 lb 14.4 oz)  SpO2 99%  BMI 20.06 kg/m2  Temp (24hrs), Av.8  F (36  C), Min:95.5  F (35.3  C), Max:98  F (36.7  C)      General:  Alert, not in respiratory or painful distress, chronically ill looking, not toxic looking, afebrile, not pale, dehydrated,   HEENT: anicteric sclera, PERRL, EOMI, oral mucosa dry  CV: RRR, nl S1/S2, no S3/S4 appreciated, no m/r/g;    Lungs: CTAB, no wheezing/crackles, no cough  Abd: full, BS+, generally soft, with mass in epigastrum, no significant tenderness    Ext: WWP, no BLE edema  Skin: no rashes, cyanosis, or jaundice  Neuro: AOx3, No focal neurologic deficits  Psych: affect full, speech fluent  Endo: no moon facies, no goiter  Musculoskeletal: atrophy of muscles          Data:   Labs (all laboratory studies reviewed by me):   ROUTINE ICU LABS (Last four results)  CMP  Recent Labs  Lab 18  0900      POTASSIUM 3.7   CHLORIDE 94   CO2 23   ANIONGAP 16*   GLC 72   BUN 56*   CR 2.31*   GFRESTIMATED 28*   GFRESTBLACK 34*   MILY 8.9   MAG 1.8   PHOS 4.5   PROTTOTAL 6.8   ALBUMIN 3.4   BILITOTAL 0.9   ALKPHOS 84   *   ALT 24     CBC  Recent Labs  Lab 18  0900   WBC 7.1   RBC 4.51   HGB 14.2   HCT 41.2   MCV 91   MCH 31.5   MCHC 34.5   RDW 13.9        INRNo lab results found in last 7 days.  Arterial Blood GasNo lab results found in last 7 days.    Imaging (all imaging studies reviewed by me):    CT ABD/PEL  2018  IMPRESSION:    1. New marked enlargement of the head and proximal body of the pancreas with diffuse surrounding edema and stranding, causing narrowing of the confluence of the splenic and superior mesenteric veins and to a lesser degree the portal vein. Given the patient's history of prior lymphoma, the findings are worrisome for lymphomatous " involvement of the pancreas. The findings are atypical for simple pancreatitis.  2. There is a 2 cm collection of oral contrast and air lateral to the junction of the third and fourth portions of the duodenum. This is favored to represent a duodenal diverticulum although contained perforation of the duodenum in this location with secondary pancreatitis would not be excluded.  3. Diffuse wall thickening throughout the colon is compatible with colitis. Differential considerations would include lymphomatous involvement of the colon, infectious or inflammatory colitis. Given the mesenteric vein compression and mild wall thickening involving some loops of small bowel, ischemic colitis would not be excluded.  4. There are scattered upper normal-sized mesenteric nodes. No significant retroperitoneal adenopathy.  5. Focal enlargement of the left common femoral vein. Recommend venous ultrasound to exclude thrombosis    COLONOSCOPY  01/19/2018  Findings:        The perianal and digital rectal examinations were normal. Pertinent        negatives include normal sphincter tone, no palpable rectal lesions and        normal prostate (size, shape, and consistency).        A submucosal partially obstructing large mass was found in the distal        transverse colon. The mass was non-circumferential. No bleeding was        present. Biopsies were taken with a cold forceps for histology.        The exam was otherwise without abnormality.        retroflexion not done due to discomfort     Pathology:  CYTOLOGIC INTERPRETATION:     Colon, Transverse Mass, fine needle aspiration:     - Diffuse large B-cell lymphoma, germinal center subtype see comment     COMMENT:   The morphologic and immunophenotypic features are favored to represent a   large B-cell lymphoma, transformed   from the patient's previously diagnosed follicular lymphoma ([id number],   [id number]). The biopsy is minute   (about 1.5 mm) and partially crushed with atypical  "lymphoid cells filling   the lamina propria between the   colonic crypts. There is diffuse staining for MYC, lack of BCL2, and a   high proliferation index by ki-67. The   differential also includes a \"double-hit\" lymphoma as well as a Burkitt   lymphoma. FISH studies to evaluate for   BCL2, BCL6, and MYC rearrangements have been ordered on the   paraffin-embedded biopsy tissue and the results   will be reported in an addendum. Since paraffin-embedded tissue is not   always ideal for FISH studies, rebiopsy   with additional fresh tissue submitted to cytogenetics may be beneficial.               Assessment/Plan:   Tristen Beckwith is a 73 year old male with a medical history significant for follicular lymphoma in complete remission after 6 cycles bendamustine/rituxan by PET/CT and BMBx; as well as hypertension, coronary artery disease (anterior wall myocardial infarction in 2001 now s/p angioplasty and stent placement) and ischemic cardiomyopathy (last LVEF 48%) with congestive heart failure (ACC stage C NYHA class III); admitted to the hospital with from Oncology clinic with concerns for relapse of DLBCL.   Nephrology consulted for management of BING and hyperuricemia.    #. Acute kidney injury:  Given presentation with elevated creatinine. Baseline creatinine 1.3 - 1.6. He admits to poor PO intake, as well as diarrhea for the last week, in addition to using Torsemide daily. He is dehydrated on physical examination, and so this presentation will suggest a pre-renal cause of BING. Other considerations include contrast induced nephropathy (increased risk given likely reduced EABV, history of CKD) as he has not had any creatinine levels checked after that CT abdomen. Also with the elevated uric acid today, there is also a concern for Crystal induced nephropathy. He denies any flank pain, but endorses pain in his right hypochondriac region. He denies any difficulty passing urine and has not noticed any blood or darkened urine. " Given lymphoma history, he is also at risk for membranous nephropathy    #. Hyperuricemia:  Presents with uric acid level of 14.7. Initial concerns for tumor lysis syndrome, but normal calcium, phosphorus, and potassium make this unlikely. He also has new BING, so contrast induced nephropathy is considered (see above).   Elevated uric acid likely due to ongoing malignancy. However, will work on ruling out any evidence of crystal nephropathy and watch for evidence of TLS.   Per Oncology team, considering Rasburicase.    #. Volume status:  Volume depleted (low EABV) based on history and physical examination.    #. Acid base status:  No significant abnormalities detected.     #. Electrolytes:  Potassium, phosphorus, and calcium within normal limits. Mild hypernatremia, and suspecting hypovolemia as the cause    #. Chronic kidney injury:  Stage 3 CKD, likely secondary to hypertension vs membranous nephropathy. Now presenting with BING.  Will manage BING as detailed above.     RECOMMENDATIONS:  -- Continue excellent cares by primary team  -- Obtain Urinalysis with microscopy (assess for casts, protein, crystals)  -- Bilateral renal ultrasound (to assess for uric acid stones, hydropnephrosis)  -- Agree with Rasburicase  -- Agree with IVF (reduced rate given CHF)  -- Daily BMP  -- Daily weights, I/O  -- Monitor UOP  -- Monitor for TLS  -- Continue supportive cares  -- Nephrology service will continue to follow         Thank you for this consult.      Patient seen and discussed with Dr. Ayala, who is in agreement with my assessment and recommedations. Please, see staff addendum for changes/additions to the plan.    Nita Gaines MD  Internal Medicine - Nephrology   PGY-3  [phone]

## 2018-01-24 NOTE — LETTER
1/24/2018       RE: Tristen Beckwith  [address]  [facility] [address]     Dear Colleague,    Thank you for referring your patient, Tristen Beckwith, to the [facility] BLOOD AND MARROW TRANSPLANT at [facility]. Please see a copy of my visit note below.    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  At that time, he was treated with    R-CHOP but developed Cytoxan-induced pneumonitis.  His history is very complex and detailed in prior notes from 2008 and 2013.  The patient has been in remission of his diffuse large B-cell lymphoma ever since the initial therapy; however, he had follicular lymphoma component relapse on several occasions.  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  He again attained a complete remission and has been followed.  He tends to minimize his symptoms and comes to the appointments only infrequently.  In the past month he noted increased abdominal distention and lower appetite.  He for the last week is unable to eat much and drinks very little water.  He is more weak.  For the last week he noted his bowels are soft and a small amount.  He goes to the bathroom frequently and the color is also unusually light.  He also noted night sweats, but no fevers.  He has been worse for the last week with profound fatigue and darker and less frequent urination.  He had been seen in urgent care, where a CT scan was obtained.  We have results of the CT scan in our system.  It showed a large abdominal mass mostly involving the pancreas and the pancreatic tissue, multiple enlarged mesenteric lymph nodes, and thickening of the intestines and colon all the way through the transverse colon with a thick bowel wall.  This was  reviewed with Dr. French and the patient underwent colonoscopy last week with a transcolonic biopsy of the lymphoid tissue.  The results are now back.  The biopsy had confirmed the lymphoma.  The cytology showed morphologic and immunophenotypic features favoring large B-cell lymphoma transformed.  Atypical lymphoid cells are filling the lamina propria between the colonic crypts.  They have diffuse staining for MYC, lack BCL2, and they have a high proliferation index by Ki-67.  The differential includes double-hit lymphoma as well as Burkitt lymphoma.  The FISH studies to evaluate for the BCL2, BCL6 and c-MYC rearrangement have been ordered and I discussed with Dr. Wisdom to run these studies STAT.  The flow cytometry showed CD10-positive kappa monotypic B-cells with expression of CD10, CD19, CD20 and CD38, and lacking CD5.       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  He has been feeling more poorly for the last 2 days.      REVIEW OF SYSTEMS:  The review of systems revealed that he has no neurological symptoms, no weakness in his arms or legs, and no facial or cranial nerve weakness.  He denies headaches.  Vision is okay.  He has no cough or chest pains and no shortness of breath.  He is still on torsemide daily for his CHF symptoms but has been drinking very poorly and eating almost nothing.  He has bowel distention and intermittent pain.  He has not been taking any pain medications, though.  He denies swelling.      PHYSICAL EXAMINATION:   GENERAL:  He is an ill-appearing, older man and he really does not look well.   VITAL SIGNS:  His vital signs are stable.  Heart rate is 84.  Oxygenation is 96%.  His blood pressure is 104/68.  He is afebrile.    HEENT:  He has anicteric sclerae.  Clear oropharynx.   NECK:  Supple without peripheral adenopathy.   CHEST:  Clear to auscultation bilaterally.   HEART:  Heart tones are regular, S1, S2.   ABDOMEN:  His abdomen is distended.  There are two masses, one in the  epigastric/mid-abdominal area and one in the right lower colon area.  These masses are palpable and are nontender.  No inguinal adenopathy.     EXTREMITIES:  Without swelling.  No deformities.      LABORATORY DATA:  I reviewed the laboratory results.  He does have acute renal failure with a creatinine of 2.33 and a uric acid of 14.  Otherwise, the CBC is consistent with hemoconcentration but without major cytopenias and his liver enzymes are almost normal.      ASSESSMENT AND PLAN:  To summarize,  Beckwith is a 73-year-old male with a longstanding history of lymphoma, initially diffuse large B-cell, then with several recurrences of follicular lymphoma, but now presenting with acute onset abdominal mass.  The biopsy is showing aggressive B-cell lymphoma, possibly Burkitt's.  We are awaiting FISH staining.  At this time, he is ill with acute renal failure, hyperuricemia, spontaneous tumor lysis, and mild abdominal obstruction.  This will all require inpatient admission to the hospital.  The treatment options were discussed with the patient.  We will start with steroids and rituximab given his renal failure and try to stabilize the situation, but he will need some multi-agent chemotherapy.  My recommendation if the creatinine improves is to proceed with methotrexate, cytarabine and rituximab as part of the hyper-CVAD combination, but other options can be considered.  Certainly he cannot get Cytoxan anymore or ifosfamide given the very close chemical structure and Cytoxan-induced pneumonitis.  He also has heart failure and was previously intolerant to Adriamycin, so anthracycline is unlikely to be safe.  We could add vincristine, cytarabine and possibly methotrexate if his kidney improved.  I discussed the plan with Dr. Fox, who will take over.  Unfortunately, [place] will be gone [place] of [place] the next 10 days and I explained to the patient that he will be in good care with the team on 7D.     I spent extra 30  minutes to coordinate the care and counseling.      Daisy Ernandez MD  Associate Professor of Medicine  [phone]      Again, thank you for allowing me to participate in the care of your patient.      Sincerely,    Daisy Ernandez MD

## 2018-01-24 NOTE — NURSING NOTE
Chief Complaint   Patient presents with     Lab Only     Pt here for venipuncture blood draw, vitals completeed by CMA. Left antecub      Jennifer Moreira MA

## 2018-01-24 NOTE — CONSULTS
Cardiology Inpatient Consultation  January 24, 2018    Reason for Consult:  A cardiology consult was requested by Abigail Ryan PA-C from the Heme/Onc service to provide clinical guidance regarding heart failure management.    HPI:   Tristen Beckwith is a 73 year old male with a past medical history significant for hypertension, hyperlipidemia, coronary artery disease s/p anterior wall MI with stenting of the LAD in 2000, chronic systolic heart failure secondary to ischemic cardiomyopathy LVEF 48%, TIA in 2012, DLBCL diagnosed in 1994 status-post chemo and radiation, and history of follicular lymphoma in 2013 achieved CR after 6 cycles of Bendamustine/Rituxan who was admitted from oncology clinic on 1/24 with relapsed DLBCL and possible tumor lysis syndrome. Patient states that for the past 2 weeks he has been experiencing abdominal pain and distention, poor PO intake, diarrhea, weakness and fatigue. He also reports 12-15 lb weight loss. He was evaluated at urgent care on 1/16 where he underwent abdominal CT which was concerning for relapsed disease. He subsequently had a colonoscopy on 1/19 which demonstrated a large mass in the transverse colon with biopsy consistent with DLBCL. Labs today show elevated creatinine of 2.3, high uric acid and LDH concerning for TLS. Given BING his cardiac medications are currently being held. Cardiology has been consulted for heart failure management in the setting of possible relapsed lymphoma and possible tumor lysis syndrome.      Mr. Beckwith follows with Dr. Arana at [facility]. As stated previously, his cardiac history is notable for coronary artery disease s/p large anterior wall MI in 2000 requiring stenting of the LAD. He has done well over the years without recurrent chest pain or further coronary events. He had an episode of syncope in 2015 and underwent nuclear myocardial perfusion which demonstrated a large fixed perfusion defect in the mid  anterior and apical wall; however, no inducible ischemia. He first noted heart failure symptoms in 2016 while visiting family in [place]. He was started on torsemide and spironolactone with improvement in symptoms. His spironolactone was discontinued 1 year ago due to renal dysfunction. He has continued to take torsemide alternating between 10 and 20 mg daily. Other cardiac medications include ASA 81 mg, atorvastatin 40 mg, metoprolol succinate 100 mg daily and lisinopril 40 mg daily. He reports chronic exertional dyspnea, which has worsened over the past few months. Previously able to walk on the treadmill > 1 mile, now dyspneic and fatigued with performing ADL's. He denies heart failure symptoms such as orthopnea, PND, edema or weight gain. No recent chest pain, palpitations, lightheadedness or syncope.     Review of Systems:    Complete review of systems was performed and negative except per HPI.    PMH:  Past Medical History:   Diagnosis Date     Coronary artery disease      H/O percutaneous transluminal coronary angioplasty      History of acute anterior wall myocardial infarction      Active Problems:  Patient Active Problem List    Diagnosis Date Noted     DLBCL (diffuse large B cell lymphoma) (H) 01/24/2018     Priority: Medium     Follicular lymphoma (H) 10/28/2014     Priority: Medium     Hyponatremia 03/26/2014     Priority: Medium     Transient cerebral ischemia 12/26/2012     Priority: Medium     Diagnosis updated by automated process. Provider to review and confirm.       Essential hypertension 12/26/2012     Priority: Medium     Problem list name updated by automated process. Provider to review       Unsteadiness 10/13/2012     Priority: Medium     Nodular lymphoma of extranodal and/or solid organ site (H) 06/18/2010     Priority: Medium     updating diagnosis code for icd10 cutover       Social History:  Social History   Substance Use Topics     Smoking status: Former Smoker     Smokeless tobacco:  Never Used     Alcohol use Not on file     Family History:  No family history on file.    Medications:    [START ON 1/25/2018] allopurinol  300 mg Oral Daily     rasburicase (ELITEK) infusion  6 mg Intravenous Once         - MEDICATION INSTRUCTIONS -       NaCl         Physical Exam:  Temp:  [95.5  F (35.3  C)-98  F (36.7  C)] 95.5  F (35.3  C)  Pulse:  [76-84] 76  Resp:  [18-20] 18  BP: ()/(56-68) 92/56  SpO2:  [96 %-99 %] 99 %  No intake or output data in the 24 hours ending 01/24/18 1342  GEN: pleasant, no acute distress  HEENT: no icterus  CV: RRR, normal s1/s2, no murmurs/rubs/s3/s4, no heave. JVP normal.   CHEST: clear to ausculation bilaterally, no rales or wheezing  ABD: soft, non-tender, normal active bowel sounds  EXTR: pulses intact. No clubbing, cyanosis or edema.   NEURO: alert oriented, speech fluent/appropriate, motor grossly nonfocal    Diagnostics:  All labs and imaging were reviewed, of note:         Lab Results   Component Value Date     01/24/2018    Lab Results   Component Value Date    CHLORIDE 94 01/24/2018    Lab Results   Component Value Date    BUN 56 01/24/2018      Lab Results   Component Value Date    POTASSIUM 3.7 01/24/2018    Lab Results   Component Value Date    CO2 23 01/24/2018    Lab Results   Component Value Date    CR 2.31 01/24/2018        Lab Results   Component Value Date    WBC 7.1 01/24/2018    HGB 14.2 01/24/2018    HCT 41.2 01/24/2018    MCV 91 01/24/2018     01/24/2018     Lab Results   Component Value Date     (H) 01/24/2018    ALT 24 01/24/2018    ALKPHOS 84 01/24/2018    BILITOTAL 0.9 01/24/2018    BILICONJ 0.0 03/27/2014     Echocardiogram 1/24/17:  Interpretation Summary  Mildly (EF 45-50%) reduced left ventricular function is present. Traced at  46%.  Presumed ischemic CM. Apical wall akinesis is present.  Global right ventricular function is normal.  The inferior vena cava was normal in size with preserved  respiratory  variability.  Trivial pericardial effusion is present.  No significant change from prior.     Left Ventricle  Left ventricular wall thickness is normal. Left ventricular size is normal.  Left ventricular diastolic function is indeterminate. Mildly (EF 45-50%)  reduced left ventricular function is present. Apical wall akinesis is present.  Apical septal akinesis. No thrombus seen.     Right Ventricle  The right ventricle is normal size. Global right ventricular function is  normal.     Atria  Both atria appear normal.     Mitral Valve  Severe mitral annular calcification is present. Trace mitral insufficiency is  present.        Aortic Valve  The aortic valve is tricuspid. Mild to moderate aortic valve sclerosis is  present. Mild aortic insufficiency is present.     Tricuspid Valve  Trace tricuspid insufficiency is present. The right ventricular systolic  pressure is approximated at 19.7 mmHg plus the right atrial pressure.     Pulmonic Valve  Trace pulmonic insufficiency is present.     Vessels  The aorta root is normal. The inferior vena cava was normal in size with  preserved respiratory variability.     Pericardium  Trivial pericardial effusion is present.    Echocardiogram done on November 7, 2017  1. Left ventricular chamber size is normal. Normal left ventricular  wall thickness.  Left ventricular ejection fraction is visually estimated at 48%.  2. Analysis of regional left ventricular function reveals: apical  akinesis is present.  3. Normal right ventricle size and normal global function.  4. Pulmonary artery systolic pressure estimate is 49mmHg above RAP.  (Elevated).  Compared with the previous report dated December 2016, there has been  no significant change in the LV function and in the LV wall motion  abnormality.    STRESS NUCLEAR PERFUSION SCAN 8/2015:  Summary  Stress results:  There was no chest pain during stress.  ECG conclusions:  The stress ECG was negative for ischemia.  Perfusion  imaging:  There was a large, severe, fixed myocardial perfusion defect  of the mid anterior and entire apical wall distribution  including the apical cap.  Gated SPECT:  The calculated left ventricular ejection fraction was 41 %.  Left ventricular ejection fraction was moderately decreased  by visual estimate.  There was severely reduced myocardial thickening and motion  of the anterior and apical walls of the left ventricle.  Impressions  Abnormal study after pharmacologic vasodilation. There was a  large infarct and a minimal amount of ischemia in the  territory of the left anterior descending coronary artery.  Findings suggest single vessel coronary artery disease.  Left ventricular systolic function was reduced, with  regional wall motion abnormalities. Based on this study,  the annual cardiovascular mortality rate is high (greater  than 3%).  Previous study comparison  No previous study is available for comparison.  CORONARY ANGIOGRAM 2000:  His last coronary angiogram was done in 2000 or 2001 when he had his angioplasty in left anterior descending coronary artery. His left anterior descending coronary artery was 100% occluded which was stented. He was noted to have 70% lesion in the posterior lateral branch of right coronary artery, 50% lesion in the circumflex coronary artery and 50% lesion in the right coronary artery.  His ejection fraction at that time was 35% with anterior akinesia.    Assessment and Recommendation:  Tristen Beckwith is a 73 year old male with a past medical history significant for coronary artery disease s/p stenting of the LAD and ischemic cardiomyopathy with LVEF 48% who was admitted today from oncology clinic with relapsed DLBCL and concern for tumor lysis syndrome. His cardiac medications are being held due to elevated creatinine of 2.3 which is up from baseline of 1.19. Cardiology has been consulted for heart failure management.     1. Coronary artery disease: History of large anterior  wall MI in 2000 s/p stenting of the LAD. No recurrent chest pain or further coronary events. Nuclear MPI in 2015 showed large fixed perfusion defect in the mid anterior and apical wall, no inducible ischemia. Currently stable without anginal symptoms. Does report reduced exertional capacity over the past 2-3 months which is likely related to relapsed lymphoma.   -- Continue ASA 81 mg daily until platelets drop below 50,000  -- When liver function improves resume statin therapy    2. Chronic systolic heart failure secondary to ischemic cardiomyopathy: Echo in 11/2017 showed LVEF 48% with apical wall akinesis. Echo today shows similar function with a LVEF of 45-50% and apical wall akinesis. He reports NYHA class III symptoms at baseline. Recently has noticed worsening exertional dyspnea; however, no orthopnea, PND or lower extremity swelling. He is euvolemic on exam, IVC is normal on echo and weight is 12 lbs below baseline of 140 lbs.   -- Continue metoprolol succinate 100 mg daily  -- Hold torsemide and lisinopril in the setting of BING and volume depletion  -- Recommend judicious use of IV fluids, no more than 75 cc/hour  -- Monitor closely for signs and symptoms of heart failure  -- When renal function improves resume torsemide 20 mg daily and lisinopril 2.5 mg daily  -- May eventually require higher doses of diuretics to keep net even while receiving large volumes of IVF with chemotherapy  -- Will continue to follow peripherally     I have discussed the above with Dr. Ulloa.    Thank you for consulting the cardiovascular services at the [facility]. Please do not hesitate to call us with any questions.     Gemma Melendez, [place], CNP  [facility] Cardiology Consult Team  Pager [phone] or [phone]    I interviewed and examined the patient with the cardiovascular consult team.  I agree with the assessment and plan as documented.    Sujit Ulloa MD, PhD  Professor of Medicine  Division of  Cardiology  Sujit Ulloa MD, PhD

## 2018-01-24 NOTE — H&P
"[facility], [place]    History and Physical  Hematology/Oncology     Date of Admission:  1/24/2018  Date of Service (when I saw the patient): 01/24/18    Assessment & Plan   Tristen Beckwith is a 73 year old male with history of DLBCL (1994) and follicular lymphoma (2013) who now presents with recurrent DLBLC. He is found to have acute on chronic kidney injury with hyperuricemia. He is being admitted for further evaluation and management prior to anticipated chemotherapy.     HEME:   #Recurrent DLBCL.   #TLS.   Presented with symptoms of epigastric/abdominal pain, bloating, and early satiety. CT A/P w/ IV contrast (1/16/18) demonstrated new marked enlargement of head and proximal body of pancreas with surrounding edema and diffuse wall thickening throughout the colon; findings concerning for lymphomatous involvement. He underwent colonoscopy with bx on 1/19; this demonstrated diffuse large B-cell lymphoma, germinal center subtype. FISH for BCL2, BCL6 and MYC pending. Met with Dr. Ernandez prior to admission. Will need management of TLS/hyperuricemia and kidney dysfunction prior to considering chemotherapy. Will need to avoid cytoxan (h/o interstitial pneumonitis) and adriamycin (cardiomyopathy). Plan to start steroids and Rituxan as \"pre-phase\"after initial workup complete, likely tomorrow after PET scan complete.   - obtain PET/CT scan  - obtain ECHO   - uric acid 14.7. Will give rasburicase x once today, start allopurinol tomorrow  - IVFs, start at 75 cc/hr given cardiomyopathy (last EF 48%), adjust as needed pending trend of labs and repeat ECHO  - follow TLS labs q8hr  - check coags  - place PICC line     CV:  #CAD s/p stenting.  #H/o HTN, HLD  #Cardiomyopathy.  - Cardiology consult to optimize cardiac status in setting of recurrent disease, TLS  - continue ASA at this time (plt 180K on admission)  - hold lipitor at this time  - hold lisinopril given current renal dysfunction and softer " BPs recently per pt report  - continue PTA metoprolol  - hold torsemide again given renal dysfunction at this time  - most recent BNP was 569 (1/2/18 in care everywhere)  - repeat ECHO as above    RENAL:   #Acute on chronic kidney injury. Appears baseline (8067-2763) ranged 1.1-1.2 with GRF >60. More recently since 01/2017 has been 1.3-1.5 (GRF 50s to >60). Of note, did get IV contrast with CT on 1/16/18. Creatinine 2.31 on admission, likely exacerbated by hyperuricemia/TLS.   - hydration, rasburicase as above  - trend with BMP q8hrs at this time  - hold/limit nephrotoxic meds as able  - Nephrology consult to optimize management prior to anticipated chemotherapy    FEN  - IVFs as above  - monitor lytes, no current repletion protocol in place given BING  - regular diet as tolerated, then NPO for PET/CT at midnight    PPx  - continue ASA as above, no other current pharmacologic VTE ppx  - monitor bowels after recent colonoscopy  - will add GI ppx once start steroids    Pt seen and discussed with Dr. Fox.     Abigail Ryan PA-C  Heme/Onc  [phone]    Primary Care Physician   Daisy Ernandez    Chief Complaint   Scheduled admission for recurrent DLBCL.     History is obtained from the patient, family, and chart review.     History of Present Illness   Mr. Tristen Beckwith is a 73 year old male with history of DLBCL (1994) and follicular lymphoma (2013) who now presents with recurrent DLBLC. Has followed with Dr. Ernandez, who he saw in clinic prior to admission; reviewed prior notes regarding his treatment history. He is being admitted for further evaluation and management prior to anticipated chemotherapy.     On admission, he is doing ok. Main concern is abdominal discomfort/bloating and early satiety. This has been ongoing for approximately the last 3 weeks. States he is tolerating 1/4-1/2 of a breakfast meal and 1/2-3/4 of evening meal. Denies N/V. He underwent colonscopy with biospy on 1/19; since  that time he has been passing loose, mucous-like stools, yellowish in color. No hematochezia. He has been making urine. Denies new adenopathy/areas of swelling. Denies SOB or peripheral edema.     Past Medical History    Past Medical History:   Diagnosis Date     Coronary artery disease      H/O percutaneous transluminal coronary angioplasty      History of acute anterior wall myocardial infarction          Past Surgical History   Past Surgical History:   Procedure Laterality Date     COLONOSCOPY N/A 1/19/2018    Procedure: COLONOSCOPY;  colonscopy;  Surgeon: Cristofer French MD;  Location:  GI         Prior to Admission Medications   Prior to Admission Medications   Prescriptions Last Dose Informant Patient Reported? Taking?   METOPROLOL SUCCINATE PO   Yes No   Sig: Take 100 mg by mouth daily.     Nutritional Supplements (SOY PROTEIN SHAKE) POWD   Yes No   Sig: Take 1 dose. by mouth as needed Every other day   aspirin 325 MG tablet   Yes No   Sig: Take 1/2 tablet daily   atorvastatin (LIPITOR) 40 MG tablet   Yes No   Sig: TAKE 1 TABLET BY MOUTH DAILY   lisinopril (PRINIVIL,ZESTRIL) 40 MG tablet   No No   Sig: Take 1 tablet by mouth daily.   torsemide (DEMADEX) 20 MG tablet   Yes No   Sig: Take 20 mg by mouth daily      Facility-Administered Medications: None     Allergies   Allergies   Allergen Reactions     Cyclophosphamide Other (See Comments)     interstitial pneumonitis     Sulfa Drugs Rash       Social History    The patient is a retired orthopedic surgeon. He is here with his wife and one of their 2 adult sons. He and his wife have been  for nearly 40 years. They live in [place], [place].     Social History     Social History     Marital status:      Spouse name: N/A     Number of children: N/A     Years of education: N/A     Occupational History     Not on file.     Social History Main Topics     Smoking status: Former Smoker     Smokeless tobacco: Never Used     Alcohol use Not on file     Drug  use: Not on file     Sexual activity: Not on file     Other Topics Concern     Not on file     Social History Narrative       Family History   No family history on file.    Review of Systems   The 10 point Review of Systems is negative other than noted in the HPI or here.     Physical Exam   Temp: 95.5  F (35.3  C) Temp src: Oral BP: 92/56 Pulse: 76   Resp: 18 SpO2: 99 % O2 Device: None (Room air)    Vital Signs with Ranges  Temp:  [95.5  F (35.3  C)-98  F (36.7  C)] 95.5  F (35.3  C)  Pulse:  [76-84] 76  Resp:  [18-20] 18  BP: ()/(56-68) 92/56  SpO2:  [96 %-99 %] 99 %  131 lbs 14.4 oz    Constitutional: Pleasant male seen sitting up in bed, up ad myron in room. Dressed in street clothes, well-groomed and generally well-appearing. He is alert, interactive. Appears younger than stated age.   HEENT: NC/AT. OP pink and dry. No lesions or thrush.   Heme/Lymph: No cervical, supra/infraclavicular, axillary, or inguinal lymphadenopathy appreciated. No bleeding on exam.   Respiratory: Breathing even and non-labored on RA. Lungs CTA b/l, no wheezing or crackles. No cough appreciated.   Cardiovascular: RRR, no murmur. No peripheral edema.   GI: Normal BS. Abd soft, non-distended. Tender in epigastrium. Palpable edge of liver appreciated, no splenomegaly appreciated.   Skin: Clean, dry, intact.   Musculoskeletal: Extremities grossly normal in appearance.   Neurologic: Alert and oriented. Speech normal. Gait normal. Grossly nonfocal.  Neuropsychiatric: Calm, normal eye contact, memory for past and recent events intact and thought process normal. Affect congruent.     Data   Results for orders placed or performed in visit on 01/24/18 (from the past 24 hour(s))   CBC with platelets differential   Result Value Ref Range    WBC 7.1 4.0 - 11.0 10e9/L    RBC Count 4.51 4.4 - 5.9 10e12/L    Hemoglobin 14.2 13.3 - 17.7 g/dL    Hematocrit 41.2 40.0 - 53.0 %    MCV 91 78 - 100 fl    MCH 31.5 26.5 - 33.0 pg    MCHC 34.5 31.5 - 36.5  g/dL    RDW 13.9 10.0 - 15.0 %    Platelet Count 180 150 - 450 10e9/L    Diff Method Automated Method     % Neutrophils 67.8 %    % Lymphocytes 14.4 %    % Monocytes 14.0 %    % Eosinophils 2.1 %    % Basophils 1.0 %    % Immature Granulocytes 0.7 %    Nucleated RBCs 0 0 /100    Absolute Neutrophil 4.8 1.6 - 8.3 10e9/L    Absolute Lymphocytes 1.0 0.8 - 5.3 10e9/L    Absolute Monocytes 1.0 0.0 - 1.3 10e9/L    Absolute Eosinophils 0.2 0.0 - 0.7 10e9/L    Absolute Basophils 0.1 0.0 - 0.2 10e9/L    Abs Immature Granulocytes 0.1 0 - 0.4 10e9/L    Absolute Nucleated RBC 0.0    Comprehensive metabolic panel (BMP + Alb, Alk Phos, ALT, AST, Total. Bili, TP)   Result Value Ref Range    Sodium 133 133 - 144 mmol/L    Potassium 3.7 3.4 - 5.3 mmol/L    Chloride 94 94 - 109 mmol/L    Carbon Dioxide 23 20 - 32 mmol/L    Anion Gap 16 (H) 3 - 14 mmol/L    Glucose 72 70 - 99 mg/dL    Urea Nitrogen 56 (H) 7 - 30 mg/dL    Creatinine 2.31 (H) 0.66 - 1.25 mg/dL    GFR Estimate 28 (L) >60 mL/min/1.7m2    GFR Estimate If Black 34 (L) >60 mL/min/1.7m2    Calcium 8.9 8.5 - 10.1 mg/dL    Bilirubin Total 0.9 0.2 - 1.3 mg/dL    Albumin 3.4 3.4 - 5.0 g/dL    Protein Total 6.8 6.8 - 8.8 g/dL    Alkaline Phosphatase 84 40 - 150 U/L    ALT 24 0 - 70 U/L     (H) 0 - 45 U/L   Lactate Dehydrogenase   Result Value Ref Range    Lactate Dehydrogenase 2588 (H) 85 - 227 U/L   Uric acid   Result Value Ref Range    Uric Acid 14.7 (H) 3.5 - 7.2 mg/dL   Protein electrophoresis   Result Value Ref Range    Albumin Fraction PENDING 3.7 - 5.1 g/dL    Alpha 1 Fraction PENDING 0.2 - 0.4 g/dL    Alpha 2 Fraction PENDING 0.5 - 0.9 g/dL    Beta Fraction PENDING 0.6 - 1.0 g/dL    Gamma Fraction PENDING 0.7 - 1.6 g/dL    Monoclonal Peak PENDING 0.0 g/dL    ELP Interpretation: PENDING

## 2018-01-24 NOTE — PROGRESS NOTES
Focus: Admit Note   D: Pt admit with DLBCL and plans for chemo treatment.  Creatinine elevate 2.31. Elevated Uric acid level 14.7 Pt has been c/o urgency and frequency with voiding. Also, having loose stools.  Abigail SMITH (GEOVANI) notified.   I: Nephrology & Cardiology consult came by. Echo completed. PIV in place.   P: Need to collect UA/UC. NPO after midnight for PET scan Thurs @ 11am. PICC line placement either late tonight or tomorrow. Pt will need rasburicase infusion. Not able to give rasburicase infusion earlier d/t no IV access.

## 2018-01-24 NOTE — MR AVS SNAPSHOT
After Visit Summary   1/24/2018    Tristen Beckwith    MRN: [id number]           Patient Information     Date Of Birth          1945        Visit Information        Provider Department      1/24/2018 8:15 AM Daisy Ernandez MD [facility] Blood and Marrow Transplant        Today's Diagnoses     Diffuse large B-cell lymphoma of intra-abdominal lymph nodes (H)    -  1    Essential hypertension              [facility] ([facility])  [address] [place] [address]  Phone: [phone]  Clinic Hours:   Monday-Thursday:7am to 7pm   Friday: 7am to 5pm   Weekends and holidays:    8am to noon (in general)  If your fever is 100.5  or greater,   call the clinic.  After hours call the   hospital at [phone] or   [phone]. Ask for the BMT   fellow on-call            Follow-ups after your visit        Your next 10 appointments already scheduled     Jan 25, 2018 11:15 AM CST   (Arrive by 11:00 AM)   PE NPET ONCOLOGY (EYES TO THIGHS) with UUPET1   [facility], [place] PET CT ([facility] [place])    [address] [id number] [phone]           Tell your doctor:   If there is any chance you may be pregnant or if you are breastfeeding.   If you have problems lying in small spaces (claustrophobia). If you do, your doctor may give you medicine to help you relax. If you have diabetes:   Have your exam early in the morning. Your blood glucose will go up as the day goes by.   Your glucose level must be 180 or less at the start of the exam. Please take any medicines you need to ensure this blood glucose level. 24 hours before your scan: Don t do any heavy exercise. (No jogging, aerobics or other workouts.) Exercise will make your pictures less accurate. 6 hours before your scan:   Stop all food and liquids (except water).   Do not chew gum or suck on mints.   If you need to take medicine with food, you may take it with a few crackers.   Please call your Imaging Department at your exam site with any questions.            Jan 25, 2018 11:15 AM CST   (Arrive by 11:00 AM)   CT SOFT TISSUE NECK W/O CONTRAST with UUCT3   [facility] [place] PET CT ([facility] [place])    [address] [facility] [id number] [phone]           Please bring any scans or X-rays taken at other hospitals, if similar tests were done. Also bring a list of your medicines, including vitamins, minerals and over-the-counter drugs. It is safest to leave personal items at home.  Be sure to tell your doctor:   If you have any allergies.   If there s any chance you are pregnant.   If you are breastfeeding.   If you have any special needs.  You do not need to do anything special to prepare.  Please wear loose clothing, such as a sweat suit or jogging clothes. Avoid snaps, zippers and other metal. We may ask you to undress and put on a hospital gown.            May 09, 2018  9:15 AM CDT   [facility] Lab Draw with  MASONIC LAB DRAW   [facility] Masonic Lab Draw ([facility])    [address] [place] [address]  [address] [place] [address] [id number] [phone]            May 09, 2018  9:45 AM CDT   RETURN ONC with Daisy Ernandez MD   [facility] Blood and Marrow Transplant ([facility])    [address] [place] [address]  [address] [place] [address] [id number] [phone]              Who to contact     If you have questions or need follow up information about today's clinic visit or your schedule please contact [facility] BLOOD AND MARROW TRANSPLANT directly at [phone].  Normal or non-critical lab and imaging results will be communicated to you by MyChart, letter or phone within 4 business days after the clinic has received the results. If you do not hear from us within 7 days, please contact the clinic through MyChart or phone. If you have a critical or abnormal lab result, we will notify you by phone as soon  "as possible.  Submit refill requests through [facility] or call your pharmacy and they will forward the refill request to us. Please allow 3 business days for your refill to be completed.          Additional Information About Your Visit        [employer] [email or web address] Information     [facility] gives you secure access to your electronic health record. If you see a primary care provider, you can also send messages to your care team and make appointments. If you have questions, please call your primary care clinic.  If you do not have a primary care provider, please call [phone] and they will assist you.        Care EveryWhere ID     This is your Care EveryWhere ID. This could be used by other organizations to access your [facility] medical records  [id number]        Your Vitals Were     Pulse Temperature Respirations Height Pulse Oximetry BMI (Body Mass Index)    84 98  F (36.7  C) (Tympanic) 20 1.702 m (5' 7\") 96% 20.09 kg/m2       Blood Pressure from Last 3 Encounters:   01/25/18 119/60   01/24/18 104/68   01/19/18 92/59    Weight from Last 3 Encounters:   01/24/18 59.8 kg (131 lb 14.4 oz)   01/24/18 58.2 kg (128 lb 4.8 oz)   05/02/17 64.9 kg (143 lb 1.6 oz)              We Performed the Following     Amylase     EKG 12-lead complete w/read - Clinics     Lipase        Recent Review Flowsheet Data     BMT Recent Results Latest Ref Rng & Units 1/24/2018 1/25/2018 1/25/2018 1/25/2018 1/25/2018 1/25/2018 1/25/2018    WBC 4.0 - 11.0 10e9/L 7.1 - - 5.6 - - -    Hemoglobin 13.3 - 17.7 g/dL 14.2 - - 11.8(L) - - -    Platelet Count 150 - 450 10e9/L 180 - - 146(L) - - -    Neutrophils (Absolute) 1.6 - 8.3 10e9/L 4.8 - - 3.4 - - -    INR 0.86 - 1.14 - - - - - - -    Sodium 133 - 144 mmol/L 133 132(L) - 134 - - -    Potassium 3.4 - 5.3 mmol/L 3.7 3.3(L) - 3.1(L) - - -    Chloride 94 - 109 mmol/L 94 93(L) - 96 - - -    Glucose 70 - 99 mg/dL 72 63(L) 60(L) 53(L) 81 60(L) 80    Urea Nitrogen 7 - 30 mg/dL 56(H) 63(H) - 62(H) - - -    Creatinine " 0.66 - 1.25 mg/dL 2.31(H) 2.63(H) - 2.46(H) - - -    Calcium (Total) 8.5 - 10.1 mg/dL 8.9 8.3(L) - 8.1(L) - - -    Protein (Total) 6.8 - 8.8 g/dL 6.8 - - 5.5(L) - - -    Albumin 3.4 - 5.0 g/dL 3.4 - - 2.7(L) - - -    Bilirubin (Direct) 0.0 - 0.2 mg/dL - - - 0.3(H) - - -    Alkaline Phosphatase 40 - 150 U/L 84 - - 66 - - -    AST 0 - 45 U/L 112(H) - - 98(H) - - -    ALT 0 - 70 U/L 24 - - 20 - - -    MCV 78 - 100 fl 91 - - 93 - - -               Primary Care Provider Office Phone # Fax #    Daisy Ernandez [phone]       [address] [place] [address]        Equal Access to Services     [facility]: Hadjayla Hutchinson, wapatricda melvi, qaybta ruby zepeda. So [facility] [phone].    ATENCIÓN: Si habla español, tiene a curry disposición servicios gratuitos de asistencia lingüística. [facility] [phone].    We comply with applicable federal civil rights laws and [place] laws. We do not discriminate on the basis of race, color, national origin, age, disability, sex, sexual orientation, or gender identity.            Thank you!     Thank you for choosing [facility] BLOOD AND MARROW TRANSPLANT  for your care. Our goal is always to provide you with excellent care. Hearing back from our patients is one way we can continue to improve our services. Please take a few minutes to complete the written survey that you may receive in the mail after your visit with us. Thank you!             Your Updated Medication List - Protect others around you: Learn how to safely use, store and throw away your medicines at www.disposemymeds.org.          This list is accurate as of 1/24/18 11:32 AM.  Always use your most recent med list.                   Brand Name Dispense Instructions for use Diagnosis    aspirin 325 MG tablet      Take 1/2 tablet daily        atorvastatin 40 MG tablet    LIPITOR     TAKE 1 TABLET BY MOUTH DAILY        lisinopril  40 MG tablet    PRINIVIL/ZESTRIL    90 tablet    Take 1 tablet by mouth daily.    Benign hypertension       METOPROLOL SUCCINATE PO      Take 100 mg by mouth daily.        SOY PROTEIN SHAKE Powd      Take 1 dose. by mouth as needed Every other day        torsemide 20 MG tablet    DEMADEX     Take 20 mg by mouth daily

## 2018-01-24 NOTE — PROGRESS NOTES
RN Care Coordination Note  Met with pt during / after clinic visit with pt today. Escorted pt to shuttle via wheelchair, accompanied by wife and son. He will present to [facility] admissions to be admitted to 7D today for tx of newly diagnosed lymphoma. ECHO will need to be done inpt - notified pt and Devika HUDSON on 7D.    Tianna Brown, RN, BSN, OCN  Care Coordinator  [facility]

## 2018-01-24 NOTE — PROGRESS NOTES
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  At that time, he was treated with    R-CHOP but developed Cytoxan-induced pneumonitis.  His history is very complex and detailed in prior notes from 2008 and 2013.  The patient has been in remission of his diffuse large B-cell lymphoma ever since the initial therapy; however, he had follicular lymphoma component relapse on several occasions.  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  He again attained a complete remission and has been followed.  He tends to minimize his symptoms and comes to the appointments only infrequently.  In the past month he noted increased abdominal distention and lower appetite.  He for the last week is unable to eat much and drinks very little water.  He is more weak.  For the last week he noted his bowels are soft and a small amount.  He goes to the bathroom frequently and the color is also unusually light.  He also noted night sweats, but no fevers.  He has been worse for the last week with profound fatigue and darker and less frequent urination.  He had been seen in urgent care, where a CT scan was obtained.  We have results of the CT scan in our system.  It showed a large abdominal mass mostly involving the pancreas and the pancreatic tissue, multiple enlarged mesenteric lymph nodes, and thickening of the intestines and colon all the way through the transverse colon with a thick bowel wall.  This was reviewed with Dr. French and the patient underwent colonoscopy last week with a transcolonic biopsy of the lymphoid tissue.  The results are now back.  The biopsy had confirmed the lymphoma.  The cytology showed morphologic and immunophenotypic features favoring large B-cell lymphoma  transformed.  Atypical lymphoid cells are filling the lamina propria between the colonic crypts.  They have diffuse staining for MYC, lack BCL2, and they have a high proliferation index by Ki-67.  The differential includes double-hit lymphoma as well as Burkitt lymphoma.  The FISH studies to evaluate for the BCL2, BCL6 and c-MYC rearrangement have been ordered and I discussed with Dr. Wisdom to run these studies STAT.  The flow cytometry showed CD10-positive kappa monotypic B-cells with expression of CD10, CD19, CD20 and CD38, and lacking CD5.       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  He has been feeling more poorly for the last 2 days.      REVIEW OF SYSTEMS:  The review of systems revealed that he has no neurological symptoms, no weakness in his arms or legs, and no facial or cranial nerve weakness.  He denies headaches.  Vision is okay.  He has no cough or chest pains and no shortness of breath.  He is still on torsemide daily for his CHF symptoms but has been drinking very poorly and eating almost nothing.  He has bowel distention and intermittent pain.  He has not been taking any pain medications, though.  He denies swelling.      PHYSICAL EXAMINATION:   GENERAL:  He is an ill-appearing, older man and he really does not look well.   VITAL SIGNS:  His vital signs are stable.  Heart rate is 84.  Oxygenation is 96%.  His blood pressure is 104/68.  He is afebrile.    HEENT:  He has anicteric sclerae.  Clear oropharynx.   NECK:  Supple without peripheral adenopathy.   CHEST:  Clear to auscultation bilaterally.   HEART:  Heart tones are regular, S1, S2.   ABDOMEN:  His abdomen is distended.  There are two masses, one in the epigastric/mid-abdominal area and one in the right lower colon area.  These masses are palpable and are nontender.  No inguinal adenopathy.     EXTREMITIES:  Without swelling.  No deformities.      LABORATORY DATA:  I reviewed the laboratory results.  He does have acute renal failure with a  creatinine of 2.33 and a uric acid of 14.  Otherwise, the CBC is consistent with hemoconcentration but without major cytopenias and his liver enzymes are almost normal.      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  The biopsy is showing aggressive B-cell lymphoma, possibly Burkitt's.  We are awaiting FISH staining.  At this time, he is ill with acute renal failure, hyperuricemia, spontaneous tumor lysis, and mild abdominal obstruction.  This will all require inpatient admission to the hospital.  The treatment options were discussed with the patient.  We will start with steroids and rituximab given his renal failure and try to stabilize the situation, but he will need some multi-agent chemotherapy.  My recommendation if the creatinine improves is to proceed with methotrexate, cytarabine and rituximab as part of the hyper-CVAD combination, but other options can be considered.  Certainly he cannot get Cytoxan anymore or ifosfamide given the very close chemical structure and Cytoxan-induced pneumonitis.  He also has heart failure and was previously intolerant to Adriamycin, so anthracycline is unlikely to be safe.  We could add vincristine, cytarabine and possibly methotrexate if his kidney improved.  I discussed the plan with Dr. Fox, who will take over.  Unfortunately, [place] 10 days and I explained to the patient that he will be in good care with the team on 7D.     I spent extra 30 minutes to coordinate the care and counseling.      Daisy Ernandez MD  Associate Professor of Medicine  [phone]

## 2018-01-25 NOTE — PLAN OF CARE
Problem: Patient Care Overview  Goal: Plan of Care/Patient Progress Review  Outcome: No Change  Whole body pet ct done.low bs due to npo for long time.  ? Chemo tonight.afeb.vss. Fall precaution. Need stool cx for d ciff for diarrhea.1 SBA with activity.

## 2018-01-25 NOTE — PLAN OF CARE
Problem: Chemotherapy Effects (Adult)  Goal: Signs and Symptoms of Listed Potential Problems Will be Absent, Minimized or Managed (Chemotherapy Effects)  Signs and symptoms of listed potential problems will be absent, minimized or managed by discharge/transition of care (reference Chemotherapy Effects (Adult) CPG).   Outcome: No Change  VSS. Afebrile. NPO after midnight for PET scan at 11 am today. Renal US needs to be reordered for today. NS @ 75 mL/hr to R) PICC. Warm pack applied to upper arm for tenderness from PICC. Blood glucose 63 & 60. Independent in room. Voiding spontaneously. Wife at bedside. Will continue w/POC.

## 2018-01-25 NOTE — PLAN OF CARE
"Problem: Chemotherapy Effects (Adult)  Goal: Signs and Symptoms of Listed Potential Problems Will be Absent, Minimized or Managed (Chemotherapy Effects)  Signs and symptoms of listed potential problems will be absent, minimized or managed by discharge/transition of care (reference Chemotherapy Effects (Adult) CPG).     Admission questions done. VSS. Afebrile. No pain or nausea. Appetite poor. Able to ambulate per self to the bathroom. Urine specimen finally sent @ 2130 for UA/UC. Patient kept forgetting to leave a sample and also is having frequent bouts of diarrhea (providers are aware of this). Given pull-ups to wear, as he is not always able to get to the toilet fast enough. Had cardiology and nephrology consults this shift. Echo performed and picc line placed in room. Portable x-ray done to confirm placement. See OK for picc line use per provider order. Waiting for transport at this time for patient to go to imaging for a renal US. Patient declined scheduled Q 8 hour lab draw at 1600, stating he was too dehydrated to have it done and wanted to have IVF's running for a few hours first. Labs also delayed waiting for IV pump equipment and provider urgency to have rasburicase infused as soon as possible. Then patient decided to wait until picc line was placed instead of having a peripheral IV needle stick. Next lab draw due at 2300. Patient will be NPO after midnight for scheduled PET scan tomorrow @ 1100.     Patient and his wife are very anxious. Expressed their frustration with not having a \"private room to themselves.\" Wife will be spending the night. Able to supply her with a recliner chair, although wife stated \"they said I could have a bed like his [a hospital bed like the patient has]. Both of them making repeated requests and frequent questions. Given much support and time from this nurse to explain to them that there is a process and a period of waiting time for 1) equipment to be ordered from supply and " "to be delivered to the unit; 2) that the picc line needs to be inserted in a sterile manner by a trained vascular access nurse; 3) that his labs and creatinine need to be stable before starting chemo treatment; 4) importance and reasons for lab draws and scans; 5) each step by step process in his plan of care, ect.     Addendum @ [id number]: Transport came to get patient for his renal US but patient refused to go. Stated \"I don't know this Dr Oropeza that ordered it. I don't know why he wants it. He doesn't know me.\" This nurse paged Dr Oropeza and spoke to him. Relayed to patient that Dr Thomas recommended the US before starting chemotherapy and that Dr Oropeza put the order in for her. By the time patient agreed to go, transport and imaging were done for the night. Order was cancelled and will need to be reordered in the morning.   "

## 2018-01-25 NOTE — PROGRESS NOTES
"[facility], [place]    Hematology/Oncology Progress Note    Date of Service (when I saw the patient): 01/25/2018     Assessment & Plan   Tristen Beckwith is a 73 year old male with history of DLBCL (1994) and follicular lymphoma (2013) who now presents with recurrent DLBLC. He is found to have acute on chronic kidney injury with hyperuricemia. He was admitted on 1/24/18 for further evaluation and management prior to anticipated chemotherapy.      HEME:   #Recurrent DLBCL.   #Hyperuricemia, concern for TLS.   Presented with symptoms of epigastric/abdominal pain, bloating, and early satiety. CT A/P w/ IV contrast (1/16/18) demonstrated new marked enlargement of head and proximal body of pancreas with surrounding edema and diffuse wall thickening throughout the colon; findings concerning for lymphomatous involvement. He underwent colonoscopy with bx on 1/19; this demonstrated diffuse large B-cell lymphoma, germinal center subtype. FISH for BCL2, BCL6 and MYC pending. Met with Dr. Ernandez prior to admission. Currently working to optimize kidney function prior to considering chemotherapy. Will need to avoid cytoxan (h/o interstitial pneumonitis) and adriamycin (cardiomyopathy). Plan to start steroids and Rituxan as \"pre-phase\"after initial workup complete, likely today after PET scan complete.   - will get PET/CT scan today  - ECHO (1/24) demonstrated EF 46%y, start allopurinol tomorrow  - PICC line placed  - uric acid 14.7 on admission --> 4.8 today s/p rasburicase on 1/24. Will start Allopurinol today  - continue IVFs, pt is tolerating 75 cc/hr and wishes to trial 100 cc/hr; will do so cautiously given cardiomyopathy  - follow TLS labs q8hr. Phos mildly elevated on 1/24 PM labs, but back to WNL today.  - obtain baseline coags (not drawn yesterday, so will re-order for today)     CV:  #CAD s/p stenting.  #H/o HTN, HLD  #Ischemic cardiomyopathy.  History of large anterior wall MI in 2000 s/p " stenting of LAD. Resultant chronic systolic heart failure 2/2 ischemia. ECHO in 11/2017 demonstrated EF 48%. Most recent BNP was 569 (1/2/18 in care everywhere). Repeat ECHO prior to anticipated treatment demonstrates EF 46% (1/24/18).  - appreciate Cardiology consult to optimize cardiac status in setting of recurrent disease  - continue ASA at this time until Plt count <50K   - continue PTA metoprolol  - holding lipitor at this time (mildly elevated AST)  - holding lisinopril given current renal dysfunction  - hold torsemide again given renal dysfunction at this time     RENAL:   #Acute on chronic kidney injury. Appears baseline (4998-5038) ranged 1.1-1.2 with GRF >60. More recently since 01/2017 has been 1.3-1.6 (GRF 50s to >60). Of note, did get IV contrast with CT on 1/16/18. Creatinine 2.31 on admission, likely exacerbated by hyperuricemia/TLS and dehydration (given poor PO intake).   - IVFs as above  - s/p rasburicase (1/24)  - UA with 1 hyaline cast  - trend with BMP q8hrs at this time  - hold/limit nephrotoxic meds as able  - appreciate Nephrology consult to optimize management prior to anticipated chemotherapy  - recommendation was made for Renal US, but we will evaluate the kidneys with PET imaging today    GI:  #Abdominal bloating, distention, early satiety. 2/2 lymphomatous involvement. Anticipate improvement in symptoms after   - add simethicone PRN     #Watery, mucous-like stools. Pt is s/p colonscopy on 1/19. Anticipate ongoing symptoms are due to lymphomatous involvement of the colon. Would like to avoid any bowel meds (anti-motility and stimulating laxatives) at this time as pt is also at risk for bowel perforation, especially as we begin treatment, due to lymphoma involvement.   - although less likely, will r/o infection and send C.diff and enteric panel testing    ENDO:  #Hypoglycemia. BGs 60s overnight, 53 on AM labs this AM. Likely 2/2 poor PO intake throughout 1/24 followed by NPO status  overnight (1/24-1/25) in anticipation of PET scan. S/p D50 x once, glucose gel. Concern for impacting read of PET/CT (and thus did not start D5 in fluids) but needed to treat hypoglycemia. After further discussion between Dr. Fox and Radiology team, decision made to proceed with PET/CT scan as scheduled this AM.   - monitor BG closely throughout today with hypoglycemia protocol in place  - will resume regular diet as tolerated after PET/CT scan     FEN  - IVFs as above  - monitor lytes, no current repletion protocol in place given BING. Will give K-Dur 40 mEq x once today for hypokalemia.  - regular diet as tolerated, then NPO for PET/CT at midnight     PPx  - continue ASA as above, no other current pharmacologic VTE ppx  - monitor bowels after recent colonoscopy  - will add GI ppx once start steroids     Pt seen and discussed with Dr. Fox.      Abigail Ryan PA-C  Heme/Onc  [phone]    Interval History   No acute events overnight, afebrile. He had difficulty with low BG overnight and this AM. BG 53-60s. He received D50 x once, then oral glucose gel. BG 80 on last assess this AM. He is asymptomatic. No sweats, feeling mentally clear. His biggest concern continues to be abdominal bloating/fullness and frequent watery/mucousy stools. No current nausea, no vomiting. He describes his stools as watery with strings of fecal matter. No report of hematochezia. He feels he is tolerating the fluids running at 75 cc/hr and wishes to trial up to 100 cc/hr to see if this has any further effect on his kidney function.    Physical Exam   Temp: 96.2  F (35.7  C) Temp src: Oral BP: 119/60 Pulse: 77   Resp: 20 SpO2: 96 % O2 Device: None (Room air)    Vitals:    01/24/18 1144   Weight: 59.8 kg (131 lb 14.4 oz)     Vital Signs with Ranges  Temp:  [95.5  F (35.3  C)-98.1  F (36.7  C)] 96.2  F (35.7  C)  Pulse:  [76-83] 77  Resp:  [18-20] 20  BP: ()/(55-64) 119/60  SpO2:  [96 %-99 %] 96 %  I/O last 3 completed  shifts:  In: 530 [I.V.:530]  Out: -   Constitutional: Thin, pleasant male seen lying up in bed, up ad myron in room. He is alert, interactive. Appears younger than stated age.   HEENT: NC/AT. OP pink and dry. No lesions or thrush.   Respiratory: Breathing even and non-labored on RA. Able to sit up for exam. Lungs CTA b/l, no wheezing or crackles. No cough appreciated.   Cardiovascular: RRR, no murmur. No peripheral edema.   GI: Normal to hyperactive BS. Abd mildly distended with firm area in epigastrium/RUQ but remains soft/not rigid and no guarding. Tender in epigastrium. Palpable edge of liver appreciated, no splenomegaly appreciated.   Skin: Clean, dry, intact.   Musculoskeletal: Extremities thin, grossly normal in appearance.   Neurologic: Alert and oriented. Speech normal. Grossly nonfocal.  Neuropsychiatric: Calm, mentation appears normal, affect congruent.          Medications     NaCl 75 mL/hr at 01/25/18 0856     - MEDICATION INSTRUCTIONS -         allopurinol  300 mg Oral Daily     metoprolol succinate (TOPROL-XL) 24 hr tablet 100 mg  100 mg Oral Daily     aspirin EC  161 mg Oral Daily     sodium chloride (PF)  10 mL Intracatheter Q7 Days       Data   Results for orders placed or performed during the hospital encounter of 01/24/18 (from the past 24 hour(s))   Cardiology General Adult IP Consult: Patient to be seen: Routine within 24 hrs; Call back #: [phone] or [phone]; pt with h/o heart failture and CAD; request assist with optimizing cardiac status during pre-phase chemotherapy for relapsed DLBCL; Cons...    Narrative    Sujit Ulloa MD     1/24/2018  9:55 PM       Cardiology Inpatient Consultation  January 24, 2018    Reason for Consult:  A cardiology consult was requested by Abigail Ryan PA-C from   the Heme/Onc service to provide clinical guidance regarding heart   failure management.    HPI:   Tristen Beckwith is a 73 year old male with a past medical history   significant for hypertension,  hyperlipidemia, coronary artery   disease s/p anterior wall MI with stenting of the LAD in 2000,   chronic systolic heart failure secondary to ischemic   cardiomyopathy LVEF 48%, TIA in 2012, DLBCL diagnosed in 1994   status-post chemo and radiation, and history of follicular   lymphoma in 2013 achieved CR after 6 cycles of   Bendamustine/Rituxan who was admitted from oncology clinic on   1/24 with relapsed DLBCL and possible tumor lysis syndrome.   Patient states that for the past 2 weeks he has been experiencing   abdominal pain and distention, poor PO intake, diarrhea, weakness   and fatigue. He also reports 12-15 lb weight loss. He was   evaluated at urgent care on 1/16 where he underwent abdominal CT   which was concerning for relapsed disease. He subsequently had a   colonoscopy on 1/19 which demonstrated a large mass in the   transverse colon with biopsy consistent with DLBCL. Labs today   show elevated creatinine of 2.3, high uric acid and LDH   concerning for TLS. Given BING his cardiac medications are   currently being held. Cardiology has been consulted for heart   failure management in the setting of possible relapsed lymphoma   and possible tumor lysis syndrome.      Mr. Beckwith follows with Dr. Arana at [facility]   [place]. As stated previously, his cardiac history is notable for   coronary artery disease s/p large anterior wall MI in 2000   requiring stenting of the LAD. He has done well over the years   without recurrent chest pain or further coronary events. He had   an episode of syncope in 2015 and underwent nuclear myocardial   perfusion which demonstrated a large fixed perfusion defect in   the mid anterior and apical wall; however, no inducible ischemia.   He first noted heart failure symptoms in 2016 while visiting   family in [place]. He was started on torsemide and spironolactone   with improvement in symptoms. His spironolactone was discontinued   1 year ago due to renal  dysfunction. He has continued to take   torsemide alternating between 10 and 20 mg daily. Other cardiac   medications include ASA 81 mg, atorvastatin 40 mg, metoprolol   succinate 100 mg daily and lisinopril 40 mg daily. He reports   chronic exertional dyspnea, which has worsened over the past few   months. Previously able to walk on the treadmill > 1 mile, now   dyspneic and fatigued with performing ADL's. He denies heart   failure symptoms such as orthopnea, PND, edema or weight gain. No   recent chest pain, palpitations, lightheadedness or syncope.     Review of Systems:    Complete review of systems was performed and negative except per   HPI.    PMH:  Past Medical History:   Diagnosis Date     Coronary artery disease      H/O percutaneous transluminal coronary angioplasty      History of acute anterior wall myocardial infarction      Active Problems:  Patient Active Problem List    Diagnosis Date Noted     DLBCL (diffuse large B cell lymphoma) (H) 01/24/2018     Priority: Medium     Follicular lymphoma (H) 10/28/2014     Priority: Medium     Hyponatremia 03/26/2014     Priority: Medium     Transient cerebral ischemia 12/26/2012     Priority: Medium     Diagnosis updated by automated process. Provider to review and   confirm.       Essential hypertension 12/26/2012     Priority: Medium     Problem list name updated by automated process. Provider to   review       Unsteadiness 10/13/2012     Priority: Medium     Nodular lymphoma of extranodal and/or solid organ site (H)   06/18/2010     Priority: Medium     updating diagnosis code for icd10 cutover       Social History:  Social History   Substance Use Topics     Smoking status: Former Smoker     Smokeless tobacco: Never Used     Alcohol use Not on file     Family History:  No family history on file.    Medications:    [START ON 1/25/2018] allopurinol  300 mg Oral Daily     rasburicase (ELITEK) infusion  6 mg Intravenous Once         - MEDICATION INSTRUCTIONS -        NaCl         Physical Exam:  Temp:  [95.5  F (35.3  C)-98  F (36.7  C)] 95.5  F (35.3  C)  Pulse:  [76-84] 76  Resp:  [18-20] 18  BP: ()/(56-68) 92/56  SpO2:  [96 %-99 %] 99 %  No intake or output data in the 24 hours ending 01/24/18 1342  GEN: pleasant, no acute distress  HEENT: no icterus  CV: RRR, normal s1/s2, no murmurs/rubs/s3/s4, no heave. JVP   normal.   CHEST: clear to ausculation bilaterally, no rales or wheezing  ABD: soft, non-tender, normal active bowel sounds  EXTR: pulses intact. No clubbing, cyanosis or edema.   NEURO: alert oriented, speech fluent/appropriate, motor grossly   nonfocal    Diagnostics:  All labs and imaging were reviewed, of note:         Lab Results   Component Value Date     01/24/2018    Lab Results   Component Value Date    CHLORIDE 94 01/24/2018    Lab Results   Component Value Date    BUN 56 01/24/2018      Lab Results   Component Value Date    POTASSIUM 3.7 01/24/2018    Lab Results   Component Value Date    CO2 23 01/24/2018    Lab Results   Component Value Date    CR 2.31 01/24/2018        Lab Results   Component Value Date    WBC 7.1 01/24/2018    HGB 14.2 01/24/2018    HCT 41.2 01/24/2018    MCV 91 01/24/2018     01/24/2018     Lab Results   Component Value Date     (H) 01/24/2018    ALT 24 01/24/2018    ALKPHOS 84 01/24/2018    BILITOTAL 0.9 01/24/2018    BILICONJ 0.0 03/27/2014     Echocardiogram 1/24/17:  Interpretation Summary  Mildly (EF 45-50%) reduced left ventricular function is present.   Traced at  46%.  Presumed ischemic CM. Apical wall akinesis is present.  Global right ventricular function is normal.  The inferior vena cava was normal in size with preserved   respiratory  variability.  Trivial pericardial effusion is present.  No significant change from prior.     Left Ventricle  Left ventricular wall thickness is normal. Left ventricular size   is normal.  Left ventricular diastolic function is indeterminate. Mildly (EF    45-50%)  reduced left ventricular function is present. Apical wall   akinesis is present.  Apical septal akinesis. No thrombus seen.     Right Ventricle  The right ventricle is normal size. Global right ventricular   function is  normal.     Atria  Both atria appear normal.     Mitral Valve  Severe mitral annular calcification is present. Trace mitral   insufficiency is  present.        Aortic Valve  The aortic valve is tricuspid. Mild to moderate aortic valve   sclerosis is  present. Mild aortic insufficiency is present.     Tricuspid Valve  Trace tricuspid insufficiency is present. The right ventricular   systolic  pressure is approximated at 19.7 mmHg plus the right atrial   pressure.     Pulmonic Valve  Trace pulmonic insufficiency is present.     Vessels  The aorta root is normal. The inferior vena cava was normal in   size with  preserved respiratory variability.     Pericardium  Trivial pericardial effusion is present.    Echocardiogram done on November 7, 2017  1. Left ventricular chamber size is normal. Normal left   ventricular  wall thickness.  Left ventricular ejection fraction is visually estimated at 48%.  2. Analysis of regional left ventricular function reveals: apical  akinesis is present.  3. Normal right ventricle size and normal global function.  4. Pulmonary artery systolic pressure estimate is 49mmHg above   RAP.  (Elevated).  Compared with the previous report dated December 2016, there has   been  no significant change in the LV function and in the LV wall   motion  abnormality.    STRESS NUCLEAR PERFUSION SCAN 8/2015:  Summary  Stress results:  There was no chest pain during stress.  ECG conclusions:  The stress ECG was negative for ischemia.  Perfusion imaging:  There was a large, severe, fixed myocardial perfusion defect  of the mid anterior and entire apical wall distribution  including the apical cap.  Gated SPECT:  The calculated left ventricular ejection fraction was 41 %.  Left  ventricular ejection fraction was moderately decreased  by visual estimate.  There was severely reduced myocardial thickening and motion  of the anterior and apical walls of the left ventricle.  Impressions  Abnormal study after pharmacologic vasodilation. There was a  large infarct and a minimal amount of ischemia in the  territory of the left anterior descending coronary artery.  Findings suggest single vessel coronary artery disease.  Left ventricular systolic function was reduced, with  regional wall motion abnormalities. Based on this study,  the annual cardiovascular mortality rate is high (greater  than 3%).  Previous study comparison  No previous study is available for comparison.  CORONARY ANGIOGRAM 2000:  His last coronary angiogram was done in 2000 or 2001 when he had   his angioplasty in left anterior descending coronary artery. His   left anterior descending coronary artery was 100% occluded which   was stented. He was noted to have 70% lesion in the posterior   lateral branch of right coronary artery, 50% lesion in the   circumflex coronary artery and 50% lesion in the right coronary   artery.  His ejection fraction at that time was 35% with anterior   akinesia.    Assessment and Recommendation:  Tristen Beckwith is a 73 year old male with a past medical history   significant for coronary artery disease s/p stenting of the LAD   and ischemic cardiomyopathy with LVEF 48% who was admitted today   from oncology clinic with relapsed DLBCL and concern for tumor   lysis syndrome. His cardiac medications are being held due to   elevated creatinine of 2.3 which is up from baseline of 1.19.   Cardiology has been consulted for heart failure management.     1. Coronary artery disease: History of large anterior wall MI in   2000 s/p stenting of the LAD. No recurrent chest pain or further   coronary events. Nuclear MPI in 2015 showed large fixed perfusion   defect in the mid anterior and apical wall, no inducible    ischemia. Currently stable without anginal symptoms. Does report   reduced exertional capacity over the past 2-3 months which is   likely related to relapsed lymphoma.   -- Continue ASA 81 mg daily until platelets drop below 50,000  -- When liver function improves resume statin therapy    2. Chronic systolic heart failure secondary to ischemic   cardiomyopathy: Echo in 11/2017 showed LVEF 48% with apical wall   akinesis. Echo today shows similar function with a LVEF of 45-50%   and apical wall akinesis. He reports NYHA class III symptoms at   baseline. Recently has noticed worsening exertional dyspnea;   however, no orthopnea, PND or lower extremity swelling. He is   euvolemic on exam, IVC is normal on echo and weight is 12 lbs   below baseline of 140 lbs.   -- Continue metoprolol succinate 100 mg daily  -- Hold torsemide and lisinopril in the setting of BING and volume   depletion  -- Recommend judicious use of IV fluids, no more than 75 cc/hour  -- Monitor closely for signs and symptoms of heart failure  -- When renal function improves resume torsemide 20 mg daily and   lisinopril 2.5 mg daily  -- May eventually require higher doses of diuretics to keep net   even while receiving large volumes of IVF with chemotherapy  -- Will continue to follow peripherally     I have discussed the above with Dr. Ulloa.    Thank you for consulting the cardiovascular services at the   [facility]. Please do not hesitate to   call us with any questions.     NAPOLEON Jacome, CNP  [facility] Cardiology Consult Team  Pager [phone] or [phone]    I interviewed and examined the patient with the cardiovascular   consult team.  I agree with the assessment and plan as   documented.    Sujit Ulloa MD, PhD  Professor of Medicine  Division of Cardiology  Sujit Ulloa MD, PhD     Nephrology General Adult IP Consult: pt with relapsed DLBCL and acute on chronic kidney injury, likely 2/2 hyperuricemia in  context of rel disease. Request Renal team assit with optimizing/following renal function in anticipation of chemotherapy; ...    Narrative    Nita Gaines MD     2018  4:50 PM  Nephrology consultation  Date of service: 2018  Attending physician: Dr. Ayala      Patient: Tristen Beckwith      : 1945      MRN: [id number]    Consult Question   BING,          History of Present Illness:   Tristen Beckwith is a 73 year old male with a medical history   significant for follicular lymphoma in complete remission after 6   cycles bendamustine/rituxan by PET/CT and BMBx; as well as   hypertension, coronary artery disease (anterior wall myocardial   infarction in  now s/p angioplasty and stent placement) and   ischemic cardiomyopathy (last LVEF 48%) with congestive heart   failure (ACC stage C NYHA class III); admitted to the hospital   with from Oncology clinic with concerns for relapse of DLBCL.    Patient is in the unit with his wife.    He states that over a week ago, he developed increased abdominal   fullness, bloating and early satiety. These symptoms progressed   with consequent reduction in his oral intake. He states that he   has been unable to drink over 8 ounces of fluid in a day. He went   into an Urgent care center and a CT abdomen (2018)was   obtained given his symptoms. He followed up with his Oncologist   who recommended a colonoscopy given CT findings. This was done on   2018. Since his colonoscopy, he state that he has had   spurts of diarrhea, over 10 times per day, and this has gone on   for a week. No vomiting, fever or chills have been noted. He   takes torsemide daily (20 mg - 10  mg on alternate days, but   states that he has taken 20 mg consecutively on a few occasions)   and Lisinopril. He also takes aspirin 182 mg daily, but stopped   this prior to his colonoscopy. He denies any other NSAID use. His   last exposure to contrast was over a week ago. No recent    antibiotic use. He denies any urinary symptoms at the moment.   Review of his chart reveal a baseline creatinine of 1.3 - 1.6. No   chest pain, no worsening shortness of breath or orthopnea has   been noted. His weight was 63.8 kg a year ago.  He was seen by his Oncologist today and was admitted given   concerns of relapsed DLBCL as well as tumor lysis syndrome          Review of Symptoms:   10-point ROS reviewed, & found negative w/ exceptions noted in   the HPI.          Past Medical History:     Past Medical History:   Diagnosis Date     Coronary artery disease      H/O percutaneous transluminal coronary angioplasty      History of acute anterior wall myocardial infarction        Past Surgical History:   Procedure Laterality Date     COLONOSCOPY N/A 1/19/2018    Procedure: COLONOSCOPY;  colonscopy;  Surgeon: Cristofer French MD;  Location:  GI            Allergies:     Allergies   Allergen Reactions     Cyclophosphamide Other (See Comments)     interstitial pneumonitis     Sulfa Drugs Rash             Outpatient Medications:       Current Facility-Administered Medications on File Prior to   Encounter:  sodium chloride (PF) 0.9% PF flush 62 mL   iopamidol (ISOVUE-370) 76% solution 86 mL   barium sulfate (READI-CAT) oral suspension 2.1% 450 mL   fluorodeoxyglucose F-18 (FDG) radioisotope injection 10.23 mCi     Current Outpatient Prescriptions on File Prior to Encounter:  torsemide (DEMADEX) 20 MG tablet Take 20 mg by mouth daily   aspirin 325 MG tablet Take 1/2 tablet daily   Nutritional Supplements (SOY PROTEIN SHAKE) POWD Take 1 dose. by   mouth as needed Every other day   METOPROLOL SUCCINATE PO Take 100 mg by mouth daily.     lisinopril (PRINIVIL,ZESTRIL) 40 MG tablet Take 1 tablet by mouth   daily.             Family History:   No family history on file.          Social History:     Social History     Social History     Marital status:      Spouse name: N/A     Number of children: N/A     Years of  "education: N/A     Occupational History     Not on file.     Social History Main Topics     Smoking status: Former Smoker     Smokeless tobacco: Never Used     Alcohol use Not on file     Drug use: Not on file     Sexual activity: Not on file     Other Topics Concern     Not on file     Social History Narrative             Physical Exam:   BP 92/56 (BP Location: Left arm)  Pulse 76  Temp 95.5  F (35.3    C) (Oral)  Resp 18  Ht 1.727 m (5' 8\")  Wt 59.8 kg (131 lb   14.4 oz)  SpO2 99%  BMI 20.06 kg/m2  Temp (24hrs), Av.8  F (36  C), Min:95.5  F (35.3  C), Max:98    F (36.7  C)      General:  Alert, not in respiratory or painful distress,   chronically ill looking, not toxic looking, afebrile, not pale,   dehydrated,   HEENT: anicteric sclera, PERRL, EOMI, oral mucosa dry  CV: RRR, nl S1/S2, no S3/S4 appreciated, no m/r/g;    Lungs: CTAB, no wheezing/crackles, no cough  Abd: full, BS+, generally soft, with mass in epigastrum, no   significant tenderness    Ext: WWP, no BLE edema  Skin: no rashes, cyanosis, or jaundice  Neuro: AOx3, No focal neurologic deficits          Data:   Labs (all laboratory studies reviewed by me):   ROUTINE ICU LABS (Last four results)  CMP  Recent Labs  Lab 18  0900      POTASSIUM 3.7   CHLORIDE 94   CO2 23   ANIONGAP 16*   GLC 72   BUN 56*   CR 2.31*   GFRESTIMATED 28*   GFRESTBLACK 34*   MILY 8.9   MAG 1.8   PHOS 4.5   PROTTOTAL 6.8   ALBUMIN 3.4   BILITOTAL 0.9   ALKPHOS 84   *   ALT 24     CBC  Recent Labs  Lab 18  0900   WBC 7.1   RBC 4.51   HGB 14.2   HCT 41.2   MCV 91   MCH 31.5   MCHC 34.5   RDW 13.9        INRNo lab results found in last 7 days.  Arterial Blood GasNo lab results found in last 7 days.    Imaging (all imaging studies reviewed by me):    CT ABD/PEL  2018  IMPRESSION:    1. New marked enlargement of the head and proximal body of the   pancreas with diffuse surrounding edema and stranding, causing   narrowing of the " confluence of the splenic and superior   mesenteric veins and to a lesser degree the portal vein. Given   the patient's history of prior lymphoma, the findings are   worrisome for lymphomatous involvement of the pancreas. The   findings are atypical for simple pancreatitis.  2. There is a 2 cm collection of oral contrast and air lateral to   the junction of the third and fourth portions of the duodenum.   This is favored to represent a duodenal diverticulum although   contained perforation of the duodenum in this location with   secondary pancreatitis would not be excluded.  3. Diffuse wall thickening throughout the colon is compatible   with colitis. Differential considerations would include   lymphomatous involvement of the colon, infectious or inflammatory   colitis. Given the mesenteric vein compression and mild wall   thickening involving some loops of small bowel, ischemic colitis   would not be excluded.  4. There are scattered upper normal-sized mesenteric nodes. No   significant retroperitoneal adenopathy.  5. Focal enlargement of the left common femoral vein. Recommend   venous ultrasound to exclude thrombosis    COLONOSCOPY  01/19/2018  Findings:        The perianal and digital rectal examinations were normal.   Pertinent        negatives include normal sphincter tone, no palpable rectal   lesions and        normal prostate (size, shape, and consistency).        A submucosal partially obstructing large mass was found in   the distal        transverse colon. The mass was non-circumferential. No   bleeding was        present. Biopsies were taken with a cold forceps for   histology.        The exam was otherwise without abnormality.        retroflexion not done due to discomfort     Pathology:  CYTOLOGIC INTERPRETATION:     Colon, Transverse Mass, fine needle aspiration:     - Diffuse large B-cell lymphoma, germinal center subtype see   comment     COMMENT:   The morphologic and immunophenotypic features are  "favored to   represent a   large B-cell lymphoma, transformed   from the patient's previously diagnosed follicular lymphoma   ([id number],   [id number]). The biopsy is minute   (about 1.5 mm) and partially crushed with atypical lymphoid cells   filling   the lamina propria between the   colonic crypts. There is diffuse staining for MYC, lack of BCL2,   and a   high proliferation index by ki-67. The   differential also includes a \"double-hit\" lymphoma as well as a   Burkitt   lymphoma. FISH studies to evaluate for   BCL2, BCL6, and MYC rearrangements have been ordered on the   paraffin-embedded biopsy tissue and the results   will be reported in an addendum. Since paraffin-embedded tissue   is not   always ideal for FISH studies, rebiopsy   with additional fresh tissue submitted to cytogenetics may be   beneficial.               Assessment/Plan:   Tristen Beckwith is a 73 year old male with a medical history   significant for follicular lymphoma in complete remission after 6   cycles bendamustine/rituxan by PET/CT and BMBx; as well as   hypertension, coronary artery disease (anterior wall myocardial   infarction in 2001 now s/p angioplasty and stent placement) and   ischemic cardiomyopathy (last LVEF 48%) with congestive heart   failure (ACC stage C NYHA class III); admitted to the hospital   with from Oncology clinic with concerns for relapse of DLBCL.   Nephrology consulted for management of BING and hyperuricemia.    #. Acute kidney injury:  Given presentation with elevated creatinine. Baseline creatinine   1.3 - 1.6. He admits to poor PO intake, as well as diarrhea for   the last week, in addition to using Torsemide daily. He is   dehydrated on physical examination, and so this presentation will   suggest a pre-renal cause of BING. Other considerations include   contrast induced nephropathy (increased risk given likely reduced   EABV, history of CKD) as he has not had any creatinine levels   checked after that CT abdomen. " Also with the elevated uric acid   today, there is also a concern for Crystal induced nephropathy.   He denies any flank pain, but endorses pain in his right   hypochondriac region. He denies any difficulty passing urine and   has not noticed any blood or darkened urine. Given lymphoma   history, he is also at risk for membranous nephropathy    #. Hyperuricemia:  Presents with uric acid level of 14.7. Initial concerns for tumor   lysis syndrome, but normal calcium, phosphorus, and potassium   make this unlikely. He also has new BING, so contrast induced   nephropathy is considered (see above).   Elevated uric acid likely due to ongoing malignancy. However,   will work on ruling out any evidence of crystal nephropathy and   watch for evidence of TLS.   Per Oncology team, considering Rasburicase.    #. Volume status:  Volume depleted (low EABV) based on history and physical   examination.    #. Acid base status:  No significant abnormalities detected.     #. Electrolytes:  Potassium, phosphorus, and calcium within normal limits. Mild   hypernatremia, and suspecting hypovolemia as the cause    #. Chronic kidney injury:  Stage 3 CKD, likely secondary to hypertension vs membranous   nephropathy. Now presenting with BING.  Will manage BING as detailed above.     RECOMMENDATIONS:  -- Continue excellent cares by primary team  -- Obtain Urinalysis with microscopy (assess for casts, protein,   crystals)  -- Bilateral renal ultrasound (to assess for uric acid stones,   hydropnephrosis)  -- Agree with Rasburicase  -- Agree with IVF (reduced rate given CHF)  -- Daily BMP  -- Daily weights, I/O  -- Monitor UOP  -- Monitor for TLS  -- Continue supportive cares  -- Nephrology service will continue to follow         Thank you for this consult.      Patient seen and discussed with Dr. Ayala, who is in agreement   with my assessment and recommedations. Please, see staff addendum   for changes/additions to the plan.    Nita  MD Eder  Internal Medicine - Nephrology   PGY-3  [phone]     ECHO COMPLETE WITH OPTISON    Narrative    [id number]  [facility]73  [id number]  [id number]^ELANA^CAROL^ROMELIA           [facility],[place]  Echocardiography Laboratory  [address] [place] [address]     Name: SANDRINE PABLO  MRN: [id number]  : 1945  Study Date: 2018 02:57 PM  Age: 73 yrs  Gender: Male  Patient Location: [facility], [place]  Reason For Study: Chemotherapy  History: Chemotherapy  Ordering Physician: CAROL HUITRON  Referring Physician: CELINE GIL  Performed By: Leigh Martinez RDCS     BSA: 1.7 m2  Height: 68 in  Weight: 131 lb  BP: 92/56 mmHg  _____________________________________________________________________________  __        Procedure  Complete Portable Echo Adult. Contrast Optison. Optison (NDC #4820-2746-70)  given intravenously. Patient was given 6 ml mixture of 3 ml Optison and 6 ml  saline. 3 ml wasted.  _____________________________________________________________________________  __        Interpretation Summary  Mildly (EF 45-50%) reduced left ventricular function is present. Traced at  46%.  Presumed ischemic CM. Apical wall akinesis is present.  Global right ventricular function is normal.  The inferior vena cava was normal in size with preserved respiratory  variability.  Trivial pericardial effusion is present.  No significant change from prior.  _____________________________________________________________________________  __        Left Ventricle  Left ventricular wall thickness is normal. Left ventricular size is normal.  Left ventricular diastolic function is indeterminate. Mildly (EF 45-50%)  reduced left ventricular function is present. Apical wall akinesis is present.  Apical septal akinesis. No thrombus seen.     Right Ventricle  The right ventricle is normal size. Global right ventricular function is  normal.     Atria  Both atria appear normal.     Mitral Valve  Severe  mitral annular calcification is present. Trace mitral insufficiency is  present.        Aortic Valve  The aortic valve is tricuspid. Mild to moderate aortic valve sclerosis is  present. Mild aortic insufficiency is present.     Tricuspid Valve  Trace tricuspid insufficiency is present. The right ventricular systolic  pressure is approximated at 19.7 mmHg plus the right atrial pressure.     Pulmonic Valve  Trace pulmonic insufficiency is present.     Vessels  The aorta root is normal. The inferior vena cava was normal in size with  preserved respiratory variability.     Pericardium  Trivial pericardial effusion is present.     _____________________________________________________________________________  __  MMode/2D Measurements & Calculations  IVSd: 0.74 cm  LVIDd: 4.4 cm  LVIDs: 3.3 cm  LVPWd: 0.78 cm  FS: 24.2 %  EDV(Teich): 88.6 ml  ESV(Teich): 45.8 ml     LV mass(C)d: 103.4 grams  LV mass(C)dI: 60.6 grams/m2  Ao root diam: 2.5 cm  LA dimension: 3.8 cm  asc Aorta Diam: 3.4 cm  LA/Ao: 1.5  LVOT diam: 1.9 cm  LVOT area: 2.8 cm2  LA Volume (BP): 57.4 ml  LA Volume Index (BP): 33.6 ml/m2  RWT: 0.35        Doppler Measurements & Calculations  MV E max rose: 67.9 cm/sec  MV A max rose: 99.4 cm/sec  MV E/A: 0.68     MV max PG: 3.5 mmHg  MV mean P.0 mmHg  MV V2 VTI: 35.2 cm  MVA(VTI): 1.5 cm2  MV P1/2t max rose: 64.7 cm/sec  MV P1/2t: 68.2 msec  MVA(P1/2t): 3.2 cm2  MV dec slope: 278.0 cm/sec2  MV dec time: 0.24 sec  Ao V2 max: 114.0 cm/sec  Ao max P.0 mmHg  Ao V2 mean: 86.0 cm/sec  Ao mean PG: 3.0 mmHg  Ao V2 VTI: 20.5 cm  MANUEL(I,D): 2.6 cm2  MANUEL(V,D): 2.5 cm2  AI P1/2t: 454.8 msec  LV V1 max PG: 3.9 mmHg  LV V1 max: 98.6 cm/sec  LV V1 VTI: 18.5 cm  SV(LVOT): 52.5 ml  SI(LVOT): 30.7 ml/m2  PA acc time: 0.05 sec  TR max rose: 217.0 cm/sec  TR max P.7 mmHg  MANUEL Index (cm2/m2): 1.5  E/E' av.2  Lateral E/e': 7.2  Medial E/e': 11.3         _____________________________________________________________________________  __        Report approved by: Jonathan Haynes 01/24/2018 04:06 PM      XR Chest Port 1 View    Narrative    Exam: XR CHEST PORT 1 VW, 1/24/2018 9:30 PM    Indication: RN placed PICC - verify tip placement;     Comparison: 3/26/2014    Findings:   Right upper extremity PICC tip projects over the high right atrium.  Possible right basilar chest tube versus radiopaque object external to  the patient projecting over the right lower lung. The  cardiomediastinal silhouette and pulmonary vasculature are within  normal limits. Thoracic aortic atherosclerotic calcification. The  costophrenic angles are collimated off the image. Hazy right basilar  opacities. No pneumothorax.      Impression    Impression:   1. Right upper extremity PICC tip projects over the high right atrium.  2. Hazy right basilar opacity likely represents a layering pleural  effusion.    I have personally reviewed the examination and initial interpretation  and I agree with the findings.    JR HENNESSY MD   UA with Microscopic   Result Value Ref Range    Color Urine Yellow     Appearance Urine Clear     Glucose Urine Negative NEG^Negative mg/dL    Bilirubin Urine Negative NEG^Negative    Ketones Urine Negative NEG^Negative mg/dL    Specific Gravity Urine 1.010 1.003 - 1.035    Blood Urine Negative NEG^Negative    pH Urine 5.0 5.0 - 7.0 pH    Protein Albumin Urine 10 (A) NEG^Negative mg/dL    Urobilinogen mg/dL Normal 0.0 - 2.0 mg/dL    Nitrite Urine Negative NEG^Negative    Leukocyte Esterase Urine Negative NEG^Negative    Source Midstream Urine     WBC Urine 2 0 - 2 /HPF    RBC Urine <1 0 - 2 /HPF    Transitional Epi <1 0 - 1 /HPF    Mucous Urine Present (A) NEG^Negative /LPF    Hyaline Casts 1 0 - 2 /LPF   Urine Culture Aerobic Bacterial   Result Value Ref Range    Specimen Description Midstream Urine     Special Requests Specimen received in preservative      Culture Micro Culture in progress    Basic metabolic panel   Result Value Ref Range    Sodium 132 (L) 133 - 144 mmol/L    Potassium 3.3 (L) 3.4 - 5.3 mmol/L    Chloride 93 (L) 94 - 109 mmol/L    Carbon Dioxide 20 20 - 32 mmol/L    Anion Gap 19 (H) 3 - 14 mmol/L    Glucose 63 (L) 70 - 99 mg/dL    Urea Nitrogen 63 (H) 7 - 30 mg/dL    Creatinine 2.63 (H) 0.66 - 1.25 mg/dL    GFR Estimate 24 (L) >60 mL/min/1.7m2    GFR Estimate If Black 29 (L) >60 mL/min/1.7m2    Calcium 8.3 (L) 8.5 - 10.1 mg/dL   Lactate Dehydrogenase   Result Value Ref Range    Lactate Dehydrogenase 2435 (H) 85 - 227 U/L   Phosphorus   Result Value Ref Range    Phosphorus 5.1 (H) 2.5 - 4.5 mg/dL   Uric acid   Result Value Ref Range    Uric Acid 4.8 3.5 - 7.2 mg/dL   Glucose by meter   Result Value Ref Range    Glucose 60 (L) 70 - 99 mg/dL   CBC with platelets differential   Result Value Ref Range    WBC 5.6 4.0 - 11.0 10e9/L    RBC Count 3.83 (L) 4.4 - 5.9 10e12/L    Hemoglobin 11.8 (L) 13.3 - 17.7 g/dL    Hematocrit 35.6 (L) 40.0 - 53.0 %    MCV 93 78 - 100 fl    MCH 30.8 26.5 - 33.0 pg    MCHC 33.1 31.5 - 36.5 g/dL    RDW 14.2 10.0 - 15.0 %    Platelet Count 146 (L) 150 - 450 10e9/L    Diff Method Automated Method     % Neutrophils 61.3 %    % Lymphocytes 15.6 %    % Monocytes 19.4 %    % Eosinophils 3.1 %    % Basophils 0.4 %    % Immature Granulocytes 0.2 %    Nucleated RBCs 0 0 /100    Absolute Neutrophil 3.4 1.6 - 8.3 10e9/L    Absolute Lymphocytes 0.9 0.8 - 5.3 10e9/L    Absolute Monocytes 1.1 0.0 - 1.3 10e9/L    Absolute Eosinophils 0.2 0.0 - 0.7 10e9/L    Absolute Basophils 0.0 0.0 - 0.2 10e9/L    Abs Immature Granulocytes 0.0 0 - 0.4 10e9/L    Absolute Nucleated RBC 0.0    Magnesium   Result Value Ref Range    Magnesium 1.7 1.6 - 2.3 mg/dL   Hepatic panel   Result Value Ref Range    Bilirubin Direct 0.3 (H) 0.0 - 0.2 mg/dL    Bilirubin Total 0.8 0.2 - 1.3 mg/dL    Albumin 2.7 (L) 3.4 - 5.0 g/dL    Protein Total 5.5 (L) 6.8 - 8.8 g/dL     Alkaline Phosphatase 66 40 - 150 U/L    ALT 20 0 - 70 U/L    AST 98 (H) 0 - 45 U/L   Basic metabolic panel   Result Value Ref Range    Sodium 134 133 - 144 mmol/L    Potassium 3.1 (L) 3.4 - 5.3 mmol/L    Chloride 96 94 - 109 mmol/L    Carbon Dioxide 19 (L) 20 - 32 mmol/L    Anion Gap 19 (H) 3 - 14 mmol/L    Glucose 53 (L) 70 - 99 mg/dL    Urea Nitrogen 62 (H) 7 - 30 mg/dL    Creatinine 2.46 (H) 0.66 - 1.25 mg/dL    GFR Estimate 26 (L) >60 mL/min/1.7m2    GFR Estimate If Black 31 (L) >60 mL/min/1.7m2    Calcium 8.1 (L) 8.5 - 10.1 mg/dL   Phosphorus   Result Value Ref Range    Phosphorus 4.3 2.5 - 4.5 mg/dL   Uric acid   Result Value Ref Range    Uric Acid 0.3 (L) 3.5 - 7.2 mg/dL   Lactate Dehydrogenase   Result Value Ref Range    Lactate Dehydrogenase 2236 (H) 85 - 227 U/L   Hepatitis B Surface Antibody   Result Value Ref Range    Hepatitis B Surface Antibody 10.72 (H) <8.00 m[IU]/mL   Hepatitis B surface antigen   Result Value Ref Range    Hep B Surface Agn Nonreactive NR^Nonreactive   Hepatitis C antibody   Result Value Ref Range    Hepatitis C Antibody Nonreactive NR^Nonreactive   Glucose by meter   Result Value Ref Range    Glucose 81 70 - 99 mg/dL   Glucose by meter   Result Value Ref Range    Glucose 60 (L) 70 - 99 mg/dL   Glucose by meter   Result Value Ref Range    Glucose 80 70 - 99 mg/dL   Glucose by meter   Result Value Ref Range    Glucose 91 70 - 99 mg/dL

## 2018-01-25 NOTE — PROGRESS NOTES
Nephrology Progress Note  01/25/2018         Assessment & Recommendations:   Tristen Beckwith is a 73 year old year old male    Acute kidney injury: on CKD 3 with baseline creatinine of 1.6, presented with Cr of 2.3, related to pre renal vs ATN, poor PO intake and ongoing diarrhea, IVF was given last night, Cr now 2.4, now we are leaning towards ATN due to his slow recovery after IV fluid and his urine analysis specific gravity also shows 1010 which means his kidney is not concentrating or diluting, but we still think that this cannot be recover very slowly will monitor his kidney function daily.  No indication for RRT, it is okay to start steroid and rituximab for treatment of lymphoma.    Hyperuricemia: Uric acid improved after rasburicase.    Blood pressure and volume: Due to ongoing fluid loss with diarrhea continue IV fluids replacement.    Electrolyte and acid-base: Hyponatremia resolved, hypokalemia improved.  Anion gap metabolic acidosis bicarb is 19 no signs of sepsis or lactic acidosis but there is ongoing loss of bicarb due to diarrhea.  Continue to get worse we will consider bicarb replacement    Anemia: Hemoglobin 11.8,    DLBCL: Recurrent, PET/CT shows evidence of extensive lymphomatous infiltration, managed by oncology team.        recommendations were communicated to primary team via in-person    Seen and discussed with Dr. Jamie Zaragoza MD   [phone]    Interval History :   In the last 24 hours Tristen Beckwith feels fine with no acute distress, still feels likes dehydrated and ongoing diarrhea and poor appetite with early satiety.  He received IV fluid, his kidney function and creatinine are stable.  review of Systems:   I reviewed the following systems:  GI: Poor no appetite.  nausea or vomiting, still with diarrhea.   Neuro: No confusion  Constitutional: No fever or chills  CV: No dyspnea or edema.  No chest pain.    Physical Exam:   I/O last 3 completed shifts:  In: 1150  "[I.V.:1150]  Out: -    /64 (BP Location: Left arm)  Pulse 77  Temp 96.3  F (35.7  C) (Oral)  Resp 18  Ht 1.727 m (5' 8\")  Wt 59.8 kg (131 lb 14.4 oz)  SpO2 96%  BMI 20.06 kg/m2     GENERAL APPEARANCE: NAD, alert and oriented ×3 looks cachectic  EYES: No scleral icterus, pupils equal  HENT: Dry mouth and mucous membrane without ulcers or lesions  PULM: lungs basal crackles in right lung to auscultation,  bilaterally, equal air movement, no clubbing  CV: regular rhythm, normal rate, no rub     -JVD not elevated     -edema none  GI: soft, mild tender over epigastrium, mild distended, bowel sounds are present  INTEGUMENT: no cyanosis, no rash  NEURO: No asterixis   Access none    Labs:   All labs reviewed by me  Electrolytes/Renal -   Recent Labs   Lab Test  01/25/18   1427  01/25/18   0605  01/25/18   0010  01/24/18   0900 03/26/14   2209   NA  136  134  132*  133   < >  119*   POTASSIUM  3.7  3.1*  3.3*  3.7   < >   --    CHLORIDE  98  96  93*  94   < >   --    CO2  19*  19*  20  23   < >   --    BUN  57*  62*  63*  56*   < >   --    CR  2.20*  2.46*  2.63*  2.31*   < >   --    GLC  64*  53*  63*  72   < >   --    MILY  8.3*  8.1*  8.3*  8.9   < >   --    MAG   --   1.7   --   1.8   --   1.8   PHOS  3.5  4.3  5.1*  4.5   < >   --     < > = values in this interval not displayed.       CBC -   Recent Labs   Lab Test  01/25/18   0605 01/24/18   0900 05/02/17   1750   WBC  5.6  7.1  6.3   HGB  11.8*  14.2  13.0*   PLT  146*  180  136*       LFTs -   Recent Labs   Lab Test  01/25/18   0605  01/24/18   0900 05/02/17   1750   ALKPHOS  66  84  100   BILITOTAL  0.8  0.9  0.5   ALT  20  24  27   AST  98*  112*  32   PROTTOTAL  5.5*  6.8  6.8   ALBUMIN  2.7*  3.4  3.5       Iron Panel - No lab results found.      Imaging:  All imaging studies reviewed by me.     Current Medications:    allopurinol  300 mg Oral Daily     metoprolol succinate (TOPROL-XL) 24 hr tablet 100 mg  100 mg Oral Daily     aspirin EC  161 " mg Oral Daily     sodium chloride (PF)  10 mL Intracatheter Q7 Days       dextrose 5% and 0.9% NaCl with potassium chloride 20 mEq 100 mL/hr at 01/25/18 1705     - MEDICATION INSTRUCTIONS -       Adam Zaragoza MD

## 2018-01-25 NOTE — PROGRESS NOTES
CLINICAL NUTRITION SERVICES - REASSESSMENT NOTE     Nutrition Prescription    RECOMMENDATIONS FOR MDs/PROVIDERS TO ORDER:  Provider to switch IVF's to non-dextrose solution with PN start as discussed.    Malnutrition Status:    Severe malnutrition in the context of acute on chronic illness      Recommendations already ordered by Registered Dietitian (RD):  Order lab add on to check TG  Entered TPN change order to begin CPN, goal volume 1200 ml/day with initial 90 g Dex daily (306 kcal, GIR 1.0), 90 g AA daily (360 kcal), and 250 ml 20% IV lipids x 5 days per week.  --ONLY once pt receives ~100% of initial continuous PN volume with K+/Mg++/Phos WNL, advance PN dex by 30 g every 1-2 days (pending lytes/Glu and Pharm D/RD discretion) to goal of 250 g Dex (850 kcal) to increase provisions to 1567 kcals  (26 kcal/kg/day), 1.5 g PRO/kg/day, GIR 2.9 with 22 % kcals from Fat.    Future/Additional Recommendations:  None at this time     EVALUATION OF THE PROGRESS TOWARD GOALS   Diet: changed to NPO yesterday evening d/t pt now requiring bowel rest.    Intake: Poor since admit on 1/24  - Pt reports his has probably been consuming about 50% of his usual intake for the past several weeks PTA d/t poor appetite, abd pain, bloating, early satiety.    NEW FINDINGS   Provider consult received for Pharmacy/Nutrition to start & manage PN d/t need for bowel rest due to extensive lymphoma involvement of the abdomen    Weight: 59.7 kg (131 lbs) today. Pt reports his usual wt is around 140-141 lbs. Pt states he has always been thin for his height.    Labs:  K+, Mg and PO4 WNL, however, potential for refeeding to occur d/t poor po intakes over the past several weeks PTA.   Also, per discussion with Provider, informed that d/t pt's large tumor burden, he is at risk for tumor lysis syndrome which may cause fluctuations in pt electrolytes s well. Hence, no lytes replacement protocol ordered at this time. Provider to order replacement as  needed.     Medication:  IVF's of D5% at 100 ml/hr. Due to risk for refeeding syndrome, will need to switch to non dextrose solution. Provider informed.    ASSESSED NUTRITION NEEDS (adjusted based on pt's usual body wt is low for ht at baseline)  Estimated Energy Needs: 9570-6266 kcals/day (25-30 kcals/kg )  Justification: Maintenance    MALNUTRITION  % Intake: </= 50% for >/= 5 days (severe)  % Weight Loss: Weight loss does not meet criteria  Subcutaneous Fat Loss: Facial region: Moderate, Upper arm, Lower arm and Thoracic/intercostal: Severe (Upper extremities only observed)  Muscle Loss: Temporal, Facial & jaw region; Moderate, Thoracic region (clavicle, acromium bone, deltoid, trapezius, pectoral), Upper arm (bicep, tricep) and Lower arm  (forearm): Severe (Upper extremities only observed)  Fluid Accumulation/Edema: None noted  Malnutrition Diagnosis: Severe malnutrition in the context of acute on chronic illness    Previous Goals   1. Diet adv over next 24 hrs and pt able to meet at least 75% of estimated nutritional needs per calorie counts x 3 days.    Evaluation: Unable to evaluate    2. Wt not to decline < 59 kg    Evaluation: Met    Previous Nutrition Diagnosis  Inadequate oral intake related to h/o epigastric/abdomen pain, bloating and early satiety as evidenced by consuming at least < 75% of po intakes over past several weeks and wt loss of >4% loss x past 7 weeks.    Evaluation: No longer applicable, nutrition diagnosis changed below    CURRENT NUTRITION DIAGNOSIS  Inadequate protein-energy intake related to inability to consume oral intake d/t need for bowel rest d/t disease state and PN therapy ordered, but not yet initiated as evidenced by diet changed to NPO and no PN intakes received at this time.      INTERVENTIONS  Implementation  Collaboration with Provider regarding PN order and concern for abnormal electrolytes and hyperglycemia. Decision made to use compounded PN formula to allow for  adjusting macro and micronutrients   Nutrition education - Provided education to pt pn plan for PN therapy.  Parenteral Nutrition/IV Fluids - Initiate    Goals  Total avg nutritional intake to meet a minimum of 25 kcal/kg and 1.2 g PRO/kg daily (per dosing wt 60 kg).    Monitoring/Evaluation  Progress toward goals will be monitored and evaluated per protocol.    Debby Mccauley RD,LD  Pager [phone]

## 2018-01-26 NOTE — PLAN OF CARE
Problem: Patient Care Overview  Goal: Plan of Care/Patient Progress Review  Outcome: No Change  Monitoring TLS lab. ivf changed to sodium bicrab with d5w. bs 437 at 1330 lab. Recheck 131 at 1440 PLEASE RECHECK BS AT 1700 TODAY. NPO.will start tpn tonight( see todays pa/md note).diarrhea cont. Fall precaution due to weakness.?star chemo when pt is stable.

## 2018-01-26 NOTE — PLAN OF CARE
Problem: Patient Care Overview  Goal: Plan of Care/Patient Progress Review  Outcome: No Change    VSS. Mild abdominal pain but pt refusing intervention. Frequent diarrhea; cdiff negative; enteric panel still pending. Hypglycemic episode down to 64; apple juice given. Started on D5 NS with 20K @100 and blood sugars slowly improved through the evening; last blood sugar was 112. Team came to discuss plan and options. Made NPO. First dose of methylprednisolone given. Watching closely for bowel perforation and signs of TLS. Patient's wife and son at bedside. Continue with plan of care.

## 2018-01-26 NOTE — PROGRESS NOTES
"[facility], [place]    Hematology/Oncology Progress Note    Date of Service (when I saw the patient): 01/26/2018     Assessment & Plan   Tristen Beckwith is a 73 year old male with history of DLBCL (1994) and follicular lymphoma (2013) who now presents with recurrent DLBLC. He is found to have acute on chronic kidney injury with hyperuricemia. He was admitted on 1/24/18 for further evaluation and management prior to anticipated chemotherapy.      HEME:   #Recurrent DLBCL.   Presented with symptoms of epigastric/abdominal pain, bloating, and early satiety. CT A/P w/ IV contrast (1/16/18) demonstrated new marked enlargement of head and proximal body of pancreas with surrounding edema and diffuse wall thickening throughout the colon; findings concerning for lymphomatous involvement. He underwent colonoscopy with bx on 1/19; this demonstrated diffuse large B-cell lymphoma, germinal center subtype. FISH for BCL2, BCL6 and MYC pending. Met with Dr. Ernandez prior to admission. Initially working to optimize kidney function prior to considering chemotherapy as well as to complete staging with PET/CT imaging. Will need to avoid cytoxan (h/o interstitial pneumonitis) and adriamycin (cardiomyopathy).   - repeat ECHO (1/24) demonstrated EF 46%  - PICC line placed  - s/p PET/CT scan (1/25). This demonstrated diffuse hypermetabolic lymphoma infiltration throughout peritoneal cavity. Given extensiveness of disease and risk for bowel perforation, as well as high risk of TLS and further kidney injury, plan is to start \"pre-phase\" treatment slowly.   - initiated steroids with methylprednisolone 100mg IV on 1/25. Tolerating well so far. Will plan for total of 5 days; ordered daily thru 1/29/18  - pending tolerance and lab trend, will likely plan for Rituxan around 1/29  - further chemotherapy regimen to be determined pending tolerance of pre-phase treatment    #Hyperuricemia, concern for TLS.   - uric acid " 14.7 on admission, now downtrended s/p rasburicase on 1/24 (results may still be affected by rasburicase pending lab collection process). Continue Allopurinol.   - continue IVFs. Note, pt is tolerating rate of 100 cc/hr but will adjust MIVF rate once TPN starts on 12/26 PM  - continue tp follow TLS labs q8hr at this time    #Mild coagulopathy. INR mildly prolonged at 1.2. Likely 2/2 nutritional deficiency. PTT and fibrinogen WNL. No bleeding  - discussed adding Vit K to TPN (starting 1/26 PM)  - monitor coags with next check on 1/27, then INR q M/Th (pending trend can be monitoring more frequently if needed)     CV:  #CAD s/p stenting.  #H/o HTN, HLD  #Ischemic cardiomyopathy.  History of large anterior wall MI in 2000 s/p stenting of LAD. Resultant chronic systolic heart failure 2/2 ischemia. ECHO in 11/2017 demonstrated EF 48%. Most recent BNP was 569 (1/2/18 in care everywhere). Repeat ECHO prior to anticipated treatment demonstrates EF 46% (1/24/18).  - appreciate Cardiology consult to optimize cardiac status in setting of recurrent disease  - continue ASA at this time until Plt count <50K   - continue PTA metoprolol  - holding lipitor at this time (mild AST elevation and deciding plan for chemotherapy; add back when able)  - holding lisinopril given current renal dysfunction  - hold torsemide again given renal dysfunction at this time     RENAL:   #Acute on chronic kidney injury. Appears baseline ([id number]) ranged 1.1-1.2 with GRF >60. More recently since 01/2017 has been 1.3-1.6 (GRF 50s to >60). Of note, did get IV contrast with CT on 1/16/18. Creatinine 2.31 on admission, likely exacerbated by hyperuricemia/TLS and dehydration (given poor PO intake). Cr now downtrending; 1.92 today.  - IVFs as above  - s/p rasburicase (1/24)  - UA with 1 hyaline cast  - lymphoma does not appear to be involving/affecting the kidneys based on PET/CT  - continue to trend with BMP q8hrs at this time, especially with continuing  slow pre-phase treatment  - hold/limit nephrotoxic meds as able  - appreciate Nephrology consult to optimize management pending trend of Cr with pre-phase treatment/chemotherapy  - strict I/Os    #Metabolic acidosis. 2/2 GI losses with watery diarrhea. Bicarb has been downtrending.   - d/w Renal team and will add bicarb to fluids, continue to reassess    GI:  #Abdominal bloating, distention, early satiety. 2/2 lymphomatous involvement. Anticipate improvement in symptoms after   - simethicone PRN     #Watery, mucous-like stools. Pt is s/p colonscopy on 1/19. Anticipate ongoing symptoms are due to lymphomatous involvement of the colon. Would like to avoid any bowel meds (anti-motility and stimulating laxatives) at this time as pt is also at risk for bowel perforation, especially as we begin treatment, due to lymphoma involvement.   - although less likely, did r/o infection with C.diff (negative) and enteric panel (negative)    #Mild transaminitis. AST mildly elevated. On PET/CT there does not appear to be any substantial involvement in the liver.   - trend hepatic panel q48hrs.     ENDO:  #Hypoglycemia. RESOLVED. Overnight 1/24-1/25, BGs were 53-60s. Likely 2/2 poor PO intake throughout 1/24 followed by NPO status overnight (1/24-1/25) in anticipation of PET scan. S/p D50 x once, glucose gel.   - now on D5 based fluids until TPN start on 12/26  - monitor BG with steroids now onboard (1/25-1/29)  ADDENDUM: afternoon BMP check reported  (checked after steroid, also may be have been compromised by D5 in line). Repeat check . Will remove D5 in fluids now with plan to start TPN.     FEN  - adjust IVFs to include bicarb as above. Will adjust IVFs again with start of TPN (which will run at 50 cc/hr)  - monitor lytes, no current repletion protocol in place given BING. Monitor closely with start of TPN, concern for possible refeeding. Add standing lyte repletion pending trend of Cr.  - now NPO for bowel rest (ok for  "ice chips, small sips water) in setting of significant abdominal involvement and risk for bowel perforation with start of therapy     PPx/Misc:  - continue ASA as above  - add Lovenox ppx at this time, unless pt can increase ambulation  - add GI ppx with steroids now on board  - will place PT consult, so team is aware of pt. He will likely decline, but may be beneficial for PT to check in with patient given more prolonged admission anticipated with having to start pre-phase therapy slowly.      Pt seen and discussed with Dr. Fox.      Abigail Ryan PA-C  Heme/Onc  [phone]    Interval History   Afebrile, no acute events overnight. States he did have a bout of severe nausea at approximately 2 AM but did not throw up. Relieved with compazine. He thinks that his abdominal \"tension\" is maybe a little better. Still has the epigastric pain/tenderness but no increase in pain. Is now NPO, taking ice chips occasionally. Stools continue to be watery. Denies SOB or difficulty breathing, no edema. Appears to be tolerating current fluid rate (100 cc/hr) at this time. Mentions easy tiredness (i.e. With getting up back and forth to bathroom). We discussed PT consult, which he does not think he needs but will place so that team is aware of pt/can follow. Encouraged him to get out of bed, sit in chair, try to walk around.    Physical Exam   Temp: 95.6  F (35.3  C) Temp src: Axillary BP: 143/75   Heart Rate: 79 Resp: 18 SpO2: 97 % O2 Device: None (Room air)    Vitals:    01/24/18 1144 01/26/18 0900   Weight: 59.8 kg (131 lb 14.4 oz) 59.7 kg (131 lb 9.6 oz)     Vital Signs with Ranges  Temp:  [95.6  F (35.3  C)-96.7  F (35.9  C)] 95.6  F (35.3  C)  Heart Rate:  [73-80] 79  Resp:  [16-20] 18  BP: (121-150)/(64-85) 143/75  SpO2:  [95 %-97 %] 97 %  I/O last 3 completed shifts:  In: 1923.33 [I.V.:1923.33]  Out: 200 [Urine:200]  Constitutional: Thin, pleasant male seen reclined in bed. He is alert, interactive. Appears " younger than stated age.   HEENT: NC/AT. OP pink and dry.  Respiratory: Breathing even and non-labored on RA. Able to sit up for exam. Lungs CTA b/l, no wheezing or crackles. No cough appreciated.   Cardiovascular: RRR, no murmur. No peripheral edema.   GI: Normal to less-hyperactive BS today. Abd mildly distended with firmer area in epigastrium/RUQ, though may be slightly softer this AM. No rigidity. Remains tender in epigastrium and RUQ; he also reports some tenderness in RLQ this AM. No guarding. Palpable edge of liver appreciated, no splenomegaly appreciated.   Skin: Clean, dry, intact.   Musculoskeletal: Extremities thin, grossly normal in appearance.   Neurologic: Alert and oriented. Speech normal. Grossly nonfocal.  Neuropsychiatric: Calm, mentation appears normal, affect congruent.        Medications     IV infusion builder WITH LARGE additive list       - MEDICATION INSTRUCTIONS -         artificial saliva  5-10 mL Swish & Spit 4x Daily     methylPREDNISolone  100 mg Intravenous Daily     allopurinol  300 mg Oral Daily     metoprolol succinate (TOPROL-XL) 24 hr tablet 100 mg  100 mg Oral Daily     aspirin EC  161 mg Oral Daily     sodium chloride (PF)  10 mL Intracatheter Q7 Days       Data   Results for orders placed or performed during the hospital encounter of 01/24/18 (from the past 24 hour(s))   PET Oncology Whole Body    Narrative    Combined Report of:    PET and CT on  1/25/2018 12:43 PM :    1. PET of the neck, chest, abdomen, and pelvis.  2. PET CT Fusion for Attenuation Correction and Anatomical  Localization:    3. Diagnostic CT scan of the chest, abdomen, and pelvis without  intravenous contrast for interpretation.  3. CT of the chest, abdomen and pelvis obtained for diagnostic  interpretation.  4. 3D MIP and PET-CT fused images were processed on an independent  workstation and archived to PACS and reviewed by a radiologist.    Technique:    1. PET: The patient received 10.21 mCi of F-18-FDG;  the serum glucose  was 82 prior to administration, body weight was 59.8 kg. Images were  evaluated in the axial, sagittal, and coronal planes as well as the  rotational whole body MIP. Images were acquired from the Vertex to the  Feet.    UPTAKE WAS MEASURED AT 77 MINUTES.     2. CT: Volumetric acquisition for clinical interpretation of the  chest, abdomen, and pelvis acquired at 3 mm sections . The chest,  abdomen, and pelvis were evaluated at 5 mm sections in bone, soft  tissue, and lung windows.      The patient did not receive intravenous contrast. There did receive  oral contrast.    High resolution images of the neck were obtained with multiple oblique  projection reformats, and dictated in a separate report.    3. 3D MIP and PET-CT fused images were processed on an independent  workstation and archived to PACS and reviewed by a radiologist.    INDICATION: DLBCL, no contrast due to BING;     ADDITIONAL INFORMATION OBTAINED FROM EMR: History of diffuse large  B-cell lymphoma in 1994, follicular lymphoma in 2013, now with  recurrent diffuse large B-cell lymphoma.    COMPARISON: Outside CT 1/16/2018    FINDINGS:     HEAD/NECK:  Please see separate report.    CHEST:  Extensive soft tissue thickening in the epicardial fat pad with marked  associated hypermetabolism (series 8 image 48). There is contiguous  extension of markedly hypermetabolic asymmetric soft tissue masslike  infiltration into the right costal cartilage at multiple levels,  especially the fourth through sixth right-sided costal cartilage  (series 8 image 44).    There are several enlarged hypermetabolic lymph nodes including 2.5 x  1.2 cm lymph node adjacent to the superior vena cava (series 8 image  22), which has an SUV max of 9.2, and 1.2 cm short axis diameter  subcarinal node (series 8 image 35), which has an SUV max of 7.7.    Heart is mildly enlarged. Moderate-severe coronary artery  calcifications. Mitral annular calcifications. Right-sided  PICC tip  within the right atrium. Aneurysmal dilatation of the ascending  thoracic aorta up to 4.2 cm, previously 4.0 cm on 10/27/2014.  Pulmonary artery is normal in caliber. The central airways patent.  Large right-sided pleural effusion with associated atelectasis in the  right lung. Scattered calcified granulomas. Scattered tiny nodules  which are not definitely calcified, for example 2 mm right upper lobe  nodule (series 6 image 48).    ABDOMEN AND PELVIS:  Extensive markedly hypermetabolic soft tissue infiltration throughout  the peritoneal cavity, including relatively hypermetabolic masslike  thickening in the central mesentery extending from the pancreatic head  to the aortic bifurcation. Overall these findings do not appear to be  significantly changed 1/16/2018. There is relative sparing of the  retroperitoneum although this is certainly involved. Soft tissue  thickening and abnormal FDG uptake extends along the right spermatic  cord.    The liver, adrenals there is not appear to be any substantial  involvement of the liver, spleen, adrenals or kidneys. The colon and  especially the small bowel are to large degree obscured, but are  normal in caliber.    LOWER EXTREMITIES:   No abnormal masses or hypermetabolic lesions.    BONES:   There are no suspicious lytic or blastic osseous lesions.  There is no  abnormal FDG uptake in the skeleton. Moderate ascites. The asymmetric  soft tissue thickening and hypermetabolism, involving, as described  above the costal cartilage at multiple levels on the right, as well as  the soft tissues of the right lateral chest wall between the ribs. The  ribs themselves do not appear to be involved. No evidence of marrow  involvement elsewhere. Presumed bone marrow biopsy changes in the  pelvis. Old right lateral 10th rib fracture. Degenerative endplate  changes at L4-L5.      Impression    IMPRESSION:   In this patient with recurrent diffuse large B-cell lymphoma there  is  evidence of extensive lymphomatous infiltration:  1. Diffuse markedly hypermetabolic soft tissue thickening throughout  the peritoneal cavity including masslike thickening in the central  mesentery, with extension into the retroperitoneum and right spermatic  cord. This is compatible with diffuse lymphomatous infiltration.  2. Markedly hypermetabolic soft tissue thickening involving the  epicardial fat-pad extending to involve the costal cartilage at  multiple levels.  3. Hypermetabolic mediastinal lymph nodes consistent with lymphomatous  involvement.  4. Hypermetabolic soft tissue thickening involving the right costal  cartilage at multiple levels and the right lateral chest wall in the  rib spaces and pleura. The ribs themselves do not appear to be  involved.   5. There is no evidence of bone marrow involvement.  6. Large right-sided pleural effusion.  7. For findings in the head and neck, please see separate report.    I have personally reviewed the examination and initial interpretation  and I agree with the findings.    BRIAN CASTRO MD   CT Soft Tissue Neck w/o Contrast    Narrative    PET CT fusion examination 1/25/2018 1:28 PM  1. Neck CT without contrast  2. PET study of the neck  3. PET CT fusion study of the neck    History:  History of diffuse large B-cell lymphoma in 1994, follicular  lymphoma in 2013, now with recurrent diffuse large B-cell lymphoma.    Comparison: Neck CT 10/27/2014    Technique: Please refer to the accompanying whole body PET-CT for  report of the dose and whole body PET-CT findings.  Regarding the neck, axial images were obtained after nonionic  intravenous contrast administration, with sagittal and coronal  reconstructions performed. Neck CT images were reviewed in bone, soft  tissue, and lung windows, with review of the fused PET-CT images as  well in multiple planes.    Findings:  Confluent hypoattenuation in the periventricular white matter of the  brain, nonspecific  and probably related to chronic small vessel  ischemic disease. Normal variant cavum septum pellucidum.    Evaluation of the mucosal space demonstrates no abnormality or  abnormal metabolic uptake on PET CT in the nasopharynx, oropharynx,  hypopharynx or the glottis. The tongue base appears normal. The major  salivary glands and thyroid gland appear normal.    Nonenlarged, hypermetabolic cervical lymph nodes, for example 0.7 x  0.8 cm right level 4 lymph node (series 1 image 92), which has an SUV  max of 6.0    Reversal of the normal cervical lordosis, with multilevel degenerative  changes with moderate spinal canal narrowing at the between C4-5 and  C6-C7. The visualized paranasal sinuses and mastoid air cells are  clear.     For findings in the visualized chest see separate report.      Impression    Impression:   In this patient with recurrent diffuse large B-cell lymphoma:  1. A few small hypermetabolic low cervical lymph nodes suspicious for  lymphomatous involvement.  2. Please refer to the whole body PET CT performed as a separate  report, for the findings of the remainder of the body.    I have personally reviewed the examination and initial interpretation  and I agree with the findings.    BRIAN CASTRO MD   Basic metabolic panel   Result Value Ref Range    Sodium 136 133 - 144 mmol/L    Potassium 3.7 3.4 - 5.3 mmol/L    Chloride 98 94 - 109 mmol/L    Carbon Dioxide 19 (L) 20 - 32 mmol/L    Anion Gap 18 (H) 3 - 14 mmol/L    Glucose 64 (L) 70 - 99 mg/dL    Urea Nitrogen 57 (H) 7 - 30 mg/dL    Creatinine 2.20 (H) 0.66 - 1.25 mg/dL    GFR Estimate 30 (L) >60 mL/min/1.7m2    GFR Estimate If Black 36 (L) >60 mL/min/1.7m2    Calcium 8.3 (L) 8.5 - 10.1 mg/dL   Phosphorus   Result Value Ref Range    Phosphorus 3.5 2.5 - 4.5 mg/dL   Uric acid   Result Value Ref Range    Uric Acid <0.2 (L) 3.5 - 7.2 mg/dL   Lactate Dehydrogenase   Result Value Ref Range    Lactate Dehydrogenase 2452 (H) 85 - 227 U/L   Glucose  by meter   Result Value Ref Range    Glucose 79 70 - 99 mg/dL   Glucose by meter   Result Value Ref Range    Glucose 83 70 - 99 mg/dL   Glucose by meter   Result Value Ref Range    Glucose 87 70 - 99 mg/dL   Glucose by meter   Result Value Ref Range    Glucose 94 70 - 99 mg/dL   Clostridium difficile toxin B PCR   Result Value Ref Range    Specimen Description Feces     C Diff Toxin B PCR Negative NEG^Negative   Enteric Bacteria and Virus Panel by ANKIT Stool   Result Value Ref Range    Campylobacter group by ANKIT Not Detected NDET^Not Detected    Salmonella species by ANKIT Not Detected NDET^Not Detected    Shigella species by ANKIT Not Detected NDET^Not Detected    Vibrio group by ANKIT Not Detected NDET^Not Detected    Rotavirus A by ANKIT Not Detected NDET^Not Detected    Shiga toxin 1 gene by ANKIT Not Detected NDET^Not Detected    Shiga toxin 2 gene by ANKIT Not Detected NDET^Not Detected    Norovirus I and II by ANKIT Not Detected NDET^Not Detected    Yersinia enterocolitica by ANKIT Not Detected NDET^Not Detected    Enteric pathogen comment       Testing performed by multiplexed, qualitative PCR using the [employer] [facility] Enteric   Pathogens Nucleic Acid Test. Results should not be used as the sole basis for diagnosis,   treatment, or other patient management decisions.     Glucose by meter   Result Value Ref Range    Glucose 108 (H) 70 - 99 mg/dL   INR   Result Value Ref Range    INR 1.21 (H) 0.86 - 1.14   Partial thromboplastin time   Result Value Ref Range    PTT 28 22 - 37 sec   Fibrinogen activity   Result Value Ref Range    Fibrinogen 265 200 - 420 mg/dL   Basic metabolic panel   Result Value Ref Range    Sodium 136 133 - 144 mmol/L    Potassium 4.2 3.4 - 5.3 mmol/L    Chloride 101 94 - 109 mmol/L    Carbon Dioxide 16 (L) 20 - 32 mmol/L    Anion Gap 19 (H) 3 - 14 mmol/L    Glucose 112 (H) 70 - 99 mg/dL    Urea Nitrogen 56 (H) 7 - 30 mg/dL    Creatinine 2.03 (H) 0.66 - 1.25 mg/dL    GFR Estimate 32 (L) >60 mL/min/1.7m2     GFR Estimate If Black 39 (L) >60 mL/min/1.7m2    Calcium 8.1 (L) 8.5 - 10.1 mg/dL   Phosphorus   Result Value Ref Range    Phosphorus 3.8 2.5 - 4.5 mg/dL   Uric acid   Result Value Ref Range    Uric Acid <0.2 (L) 3.5 - 7.2 mg/dL   Lactate Dehydrogenase   Result Value Ref Range    Lactate Dehydrogenase 2441 (H) 85 - 227 U/L   Glucose by meter   Result Value Ref Range    Glucose 150 (H) 70 - 99 mg/dL   CBC with platelets differential   Result Value Ref Range    WBC 3.3 (L) 4.0 - 11.0 10e9/L    RBC Count 4.04 (L) 4.4 - 5.9 10e12/L    Hemoglobin 12.4 (L) 13.3 - 17.7 g/dL    Hematocrit 38.2 (L) 40.0 - 53.0 %    MCV 95 78 - 100 fl    MCH 30.7 26.5 - 33.0 pg    MCHC 32.5 31.5 - 36.5 g/dL    RDW 14.3 10.0 - 15.0 %    Platelet Count 154 150 - 450 10e9/L    Diff Method Automated Method     % Neutrophils 76.0 %    % Lymphocytes 17.1 %    % Monocytes 6.0 %    % Eosinophils 0.0 %    % Basophils 0.0 %    % Immature Granulocytes 0.9 %    Nucleated RBCs 0 0 /100    Absolute Neutrophil 2.5 1.6 - 8.3 10e9/L    Absolute Lymphocytes 0.6 (L) 0.8 - 5.3 10e9/L    Absolute Monocytes 0.2 0.0 - 1.3 10e9/L    Absolute Eosinophils 0.0 0.0 - 0.7 10e9/L    Absolute Basophils 0.0 0.0 - 0.2 10e9/L    Abs Immature Granulocytes 0.0 0 - 0.4 10e9/L    Absolute Nucleated RBC 0.0    Magnesium   Result Value Ref Range    Magnesium 1.8 1.6 - 2.3 mg/dL   Basic metabolic panel   Result Value Ref Range    Sodium 140 133 - 144 mmol/L    Potassium 4.1 3.4 - 5.3 mmol/L    Chloride 105 94 - 109 mmol/L    Carbon Dioxide 15 (L) 20 - 32 mmol/L    Anion Gap 20 (H) 3 - 14 mmol/L    Glucose 156 (H) 70 - 99 mg/dL    Urea Nitrogen 57 (H) 7 - 30 mg/dL    Creatinine 1.92 (H) 0.66 - 1.25 mg/dL    GFR Estimate 35 (L) >60 mL/min/1.7m2    GFR Estimate If Black 42 (L) >60 mL/min/1.7m2    Calcium 8.0 (L) 8.5 - 10.1 mg/dL   Phosphorus   Result Value Ref Range    Phosphorus 4.4 2.5 - 4.5 mg/dL   Uric acid   Result Value Ref Range    Uric Acid <0.2 (L) 3.5 - 7.2 mg/dL   Lactate  Dehydrogenase   Result Value Ref Range    Lactate Dehydrogenase 2431 (H) 85 - 227 U/L   Triglycerides   Result Value Ref Range    Triglycerides 194 (H) <150 mg/dL

## 2018-01-26 NOTE — PLAN OF CARE
Problem: Chemotherapy Effects (Adult)  Goal: Signs and Symptoms of Listed Potential Problems Will be Absent, Minimized or Managed (Chemotherapy Effects)  Signs and symptoms of listed potential problems will be absent, minimized or managed by discharge/transition of care (reference Chemotherapy Effects (Adult) CPG).   Outcome: No Change  VSS. NPO. Compazine x 1 for nausea/dry heaves. D5 NS w/20K @ 100mL/hr infusing to R) PICC. Frequent diarrhea, c-diff neg & enteric panel neg. Blood glucose 150. Closely watching for signs of bowel perforation & TLS. SBA to bathroom. Dry mouth, paged moonlighter for maybe trying Biotene. Wife & son at bedside. Will continue w/POC.

## 2018-01-26 NOTE — PROGRESS NOTES
Nephrology Progress Note      Tristne Beckwith MRN# [id number]   Age: 73 year old YOB: 1945           Assessment and Plan:   Assessment:  Tristen Beckwith is a 73 year old male with a medical history significant for follicular lymphoma in complete remission after 6 cycles bendamustine/rituxan by PET/CT and BMBx; as well as hypertension, coronary artery disease (anterior wall myocardial infarction in 2001 now s/p angioplasty and stent placement) and ischemic cardiomyopathy (last LVEF 48%) with congestive heart failure (ACC stage C NYHA class III); admitted to the hospital with from Oncology clinic with concerns for relapse of DLBCL.   Nephrology consulted for management of BING and hyperuricemia.  His presentation is related to poor intake, ongoing diarrhea, torsemide use, with likely progression to ATN given most recent urine SG. He has continued to improve.         #. Acute kidney injury:  See consult note for diagnostic reasoning. He has remained stable.   At this point, no indication for renal replacement therapy, he has continued to improved     #. Hyperuricemia- improved:  Presented with uric acid level of 14.7. Received Rasburicase on admission. Uric acid levels low.      #. Volume status:  Still looks volume depleted (low EABV), as he continues to look dehydrated on physical examination with poor PO intake, and diarrhea. Net positive I/O of 1L yesterday, but numerous watery stools not quantified.        #. Acid base status:  Bicarb of 15 today initially, with corrected anion gap (for albumin) of 23.25. Delta gap of 0.9 signifies a combination of NAGMA (diarrhea, hyperalimentation) and high AGMA (possible starvation ketosis and BING). Improved and corrected with bicarb infusion     #. Electrolytes:  Potassium, phosphorus, and calcium (corrects to 9 for albumin of 2.7) within normal limits.       #. Chronic kidney injury:  Stage 3 CKD, likely secondary to hypertension.    Will manage BING as detailed  above.     #. Recurrent DLBCL  PET CT reviewed and noted. On allopurinol and methylprednisolone.  Management deferred to Oncology team.            RECOMMENDATIONS:  -- Continue excellent cares by primary team   -- Recommended NaHCO 150 mEQ on D5W at 75 cc/hr    Acidosis improved  -- Daily BMP  -- Daily weights, I/O  -- Monitor UOP  -- Monitor for TLS  -- Continue supportive cares  -- Oncology issues to be managed by the Oncology team.   -- Given improvement and return of kidney function to baseline, Nephrology service will sign off.            Patient seen and discussed with Dr. Ayala, who is in agreement with my assessment and recommedations. Please, see staff addendum for changes/additions to the plan.     Nita Gaines MD  Internal Medicine - Nephrology   PGY-3  [phone]        Interval History:   Nursing notes reviewed and noted.  Today, he feels well. States that he continues to have up to 20 watery bowel movements daily. No new chest pain, fevers or dyspnea. He states that his abdominal fullness and discomfort has improved.   He denies any new issues .     4 point ROS including Respiratory, CV, GI and , other than that noted in the HPI,  is negative          Medications:     Current Facility-Administered Medications   Medication     artificial saliva (BIOTENE DRY MOUTHWASH) liquid 5-10 mL     methylPREDNISolone sodium succinate (solu-MEDROL) injection 100 mg     D5W 1,000 mL with sodium bicarbonate 100 mEq/L infusion     glucose 40 % gel 15-30 g    Or     dextrose 50 % injection 25-50 mL    Or     glucagon injection 1 mg     simethicone (MYLICON) chewable tablet  mg     Medication Instruction     allopurinol (ZYLOPRIM) tablet 300 mg     ondansetron (ZOFRAN) injection 8 mg    Or     ondansetron (ZOFRAN-ODT) ODT tab 8 mg    Or     ondansetron (ZOFRAN) tablet 8 mg     prochlorperazine (COMPAZINE) tablet 5-10 mg    Or     prochlorperazine (COMPAZINE) injection 5-10 mg     acetaminophen (TYLENOL)  "tablet 650 mg     lidocaine (LMX4) kit     metoprolol succinate (TOPROL-XL) 24 hr tablet 100 mg     aspirin EC EC tablet 161 mg     sodium chloride (PF) 0.9% PF flush 10-20 mL     sodium chloride (PF) 0.9% PF flush 10 mL     Facility-Administered Medications Ordered in Other Encounters   Medication     sodium chloride (PF) 0.9% PF flush 62 mL     iopamidol (ISOVUE-370) 76% solution 86 mL     barium sulfate (READI-CAT) oral suspension 2.1% 450 mL     fluorodeoxyglucose F-18 (FDG) radioisotope injection 10.23 mCi             Physical Exam:   Vitals were reviewed  Blood pressure 143/75, pulse 77, temperature 95.6  F (35.3  C), temperature source Axillary, resp. rate 18, height 1.727 m (5' 8\"), weight 59.7 kg (131 lb 9.6 oz), SpO2 97 %.    Intake/Output Summary (Last 24 hours) at 01/26/18 1028  Last data filed at 01/26/18 0543   Gross per 24 hour   Intake          1923.33 ml   Output              200 ml   Net          1723.33 ml     Vitals:    01/24/18 1144 01/26/18 0900   Weight: 59.8 kg (131 lb 14.4 oz) 59.7 kg (131 lb 9.6 oz)       Physical Exam:    General: Alert, not in respiratory or painful distress, cachectic, afebrile.    CV: RRR, nl S1/S2, no S3/S4 appreciated, no m/r/g; intact & symmetric 3+ pulses    Lungs: CTAB, no wheezing/crackles, no cough  Abd: Full, not tender,    Ext: WWP, no BLE edema  Skin: no rashes, cyanosis, or jaundice  Neuro:  AOx3, CN II-XII intact and symmetric, No focal neurologic deficits           Data:   ROUTINE LABS (Last four results)  CMP  Recent Labs  Lab 01/26/18  0547 01/25/18  2131 01/25/18  1427 01/25/18  0605  01/24/18  0900    136 136 134  < > 133   POTASSIUM 4.1 4.2 3.7 3.1*  < > 3.7   CHLORIDE 105 101 98 96  < > 94   CO2 15* 16* 19* 19*  < > 23   ANIONGAP 20* 19* 18* 19*  < > 16*   * 112* 64* 53*  < > 72   BUN 57* 56* 57* 62*  < > 56*   CR 1.92* 2.03* 2.20* 2.46*  < > 2.31*   GFRESTIMATED 35* 32* 30* 26*  < > 28*   GFRESTBLACK 42* 39* 36* 31*  < > 34*   MILY 8.0* " 8.1* 8.3* 8.1*  < > 8.9   MAG 1.8  --   --  1.7  --  1.8   PHOS 4.4 3.8 3.5 4.3  < > 4.5   PROTTOTAL  --   --   --  5.5*  --  6.8   ALBUMIN  --   --   --  2.7*  --  3.4   BILITOTAL  --   --   --  0.8  --  0.9   ALKPHOS  --   --   --  66  --  84   AST  --   --   --  98*  --  112*   ALT  --   --   --  20  --  24   < > = values in this interval not displayed.  CBC  Recent Labs  Lab 01/26/18  0547 01/25/18  0605 01/24/18  0900   WBC 3.3* 5.6 7.1   RBC 4.04* 3.83* 4.51   HGB 12.4* 11.8* 14.2   HCT 38.2* 35.6* 41.2   MCV 95 93 91   MCH 30.7 30.8 31.5   MCHC 32.5 33.1 34.5   RDW 14.3 14.2 13.9    146* 180     INR  Recent Labs  Lab 01/25/18  2131   INR 1.21*     Arterial Blood GasNo lab results found in last 7 days.  I&O's: I/O last 3 completed shifts:  In: 1923.33 [I.V.:1923.33]  Out: 200 [Urine:200]

## 2018-01-27 NOTE — PROGRESS NOTES
"[facility], [place]    Hematology/Oncology Progress Note    Date of Service (when I saw the patient): 01/27/2018     Assessment & Plan   Tristen Beckwith is a 73 year old male with history of DLBCL (1994) and follicular lymphoma (2013) who now presents with recurrent DLBLC. He is found to have acute on chronic kidney injury with hyperuricemia. He was admitted on 1/24/18 for further evaluation and management prior to anticipated chemotherapy.      HEME:   #Recurrent DLBCL.   Presented with symptoms of epigastric/abdominal pain, bloating, and early satiety. CT A/P w/ IV contrast (1/16/18) demonstrated new marked enlargement of head and proximal body of pancreas with surrounding edema and diffuse wall thickening throughout the colon; findings concerning for lymphomatous involvement. He underwent colonoscopy with bx on 1/19; this demonstrated diffuse large B-cell lymphoma, germinal center subtype. FISH for BCL2, BCL6 and MYC pending. Met with Dr. Ernandez prior to admission. Initially working to optimize kidney function prior to considering chemotherapy as well as to complete staging with PET/CT imaging. Will need to avoid cytoxan (h/o interstitial pneumonitis) and adriamycin (cardiomyopathy).   - repeat ECHO (1/24) demonstrated EF 46%  - PICC line placed  - s/p PET/CT scan (1/25). This demonstrated diffuse hypermetabolic lymphoma infiltration throughout peritoneal cavity. Given extensiveness of disease and risk for bowel perforation, as well as high risk of TLS and further kidney injury, plan is to start \"pre-phase\" treatment slowly.   - initiated steroids with methylprednisolone 100mg IV on 1/25. Tolerating well so far. Will plan for total of 5 days; ordered daily thru 1/29/18  - pending tolerance and lab trend, will likely plan for Rituxan around 1/29  - further chemotherapy regimen to be determined pending tolerance of pre-phase treatment    #Hyperuricemia, concern for TLS.   - uric acid " 14.7 on admission, now downtrended s/p rasburicase on 1/24 (results may still be affected by rasburicase pending lab collection process). Continue Allopurinol.   - continue tp follow TLS labs q8hr at this time  -TPN started, have discontinued IVF  -Full liquid diet    #Mild coagulopathy. INR mildly prolonged at 1.2. Likely 2/2 nutritional deficiency. PTT and fibrinogen WNL. No bleeding  - discussed adding Vit K to TPN (starting 1/26 PM)  - monitor coags with next check on 1/27, then INR q M/Th (pending trend can be monitoring more frequently if needed)     CV:  #CAD s/p stenting.  #H/o HTN, HLD  #Ischemic cardiomyopathy.  History of large anterior wall MI in 2000 s/p stenting of LAD. Resultant chronic systolic heart failure 2/2 ischemia. ECHO in 11/2017 demonstrated EF 48%. Most recent BNP was 569 (1/2/18 in care everywhere). Repeat ECHO prior to anticipated treatment demonstrates EF 46% (1/24/18).  - appreciate Cardiology consult to optimize cardiac status in setting of recurrent disease  - continue ASA at this time until Plt count <50K   - continue PTA metoprolol  - holding lipitor at this time (mild AST elevation and deciding plan for chemotherapy; add back when able)  - holding lisinopril given current renal dysfunction  - hold torsemide again given renal dysfunction at this time     RENAL:   #Acute on chronic kidney injury. Appears baseline (6835-0950) ranged 1.1-1.2 with GRF >60. More recently since 01/2017 has been 1.3-1.6 (GRF 50s to >60). Of note, did get IV contrast with CT on 1/16/18. Creatinine 2.31 on admission, likely exacerbated by hyperuricemia/TLS and dehydration (given poor PO intake). Cr now downtrending; 1.59 today.  - s/p rasburicase (1/24)  - UA with 1 hyaline cast  - lymphoma does not appear to be involving/affecting the kidneys based on PET/CT  - continue to trend with BMP q8hrs at this time, especially with continuing slow pre-phase treatment  - hold/limit nephrotoxic meds as able  -  appreciate Nephrology consult to optimize management pending trend of Cr with pre-phase treatment/chemotherapy  - strict I/Os  -TPN started 1/26    #Metabolic acidosis. 2/2 GI losses with watery diarrhea. Bicarb has been downtrending.   -d/c bicarb gtt     GI:  #Abdominal bloating, distention, early satiety. 2/2 lymphomatous involvement.   - simethicone PRN     #Watery, mucous-like stools. Pt is s/p colonscopy on 1/19. Anticipate ongoing symptoms are due to lymphomatous involvement of the colon. Would like to avoid any bowel meds (anti-motility and stimulating laxatives) at this time as pt is also at risk for bowel perforation, especially as we begin treatment, due to lymphoma involvement.   - although less likely, did r/o infection with C.diff (negative) and enteric panel (negative)    #Mild transaminitis. AST mildly elevated. On PET/CT there does not appear to be any substantial involvement in the liver.   - trend hepatic panel q48hrs.     ENDO:  #Hypoglycemia. RESOLVED. Overnight 1/24-1/25, BGs were 53-60s. Likely 2/2 poor PO intake throughout 1/24 followed by NPO status overnight (1/24-1/25) in anticipation of PET scan. S/p D50 x once, glucose gel.   - monitor BG with steroids now onboard (1/25-1/29)    FEN  - d/c IVF and bicarb gtt today  - monitor lytes, no current repletion protocol in place given BING. Monitor closely with start of TPN, concern for possible refeeding. Add standing lyte repletion pending trend of Cr.  - given tolerating chemotherapy so far, will advance diet to full liquids today     PPx/Misc:  - continue ASA as above  - add Lovenox ppx at this time, unless pt can increase ambulation  - add GI ppx with steroids now on board  - will place PT consult, so team is aware of pt. He will likely decline, but may be beneficial for PT to check in with patient given more prolonged admission anticipated with having to start pre-phase therapy slowly.      Pt seen and discussed with Dr. Fox.      Divina  MD Heidi  Heme/Onc PGY5  01/27/2018        Interval History   Afebrile, no acute events overnight.  Having a significant amount of diarrhea that is bothersome to him.  He states that his abdominal distention and pain is significantly improved however.  No nausea.  He feels very dry and thirsty and would like to try advancing his diet.  He is very reluctant to ambulate and feels that 4 times a day is too much.  Denies cough, shortness of breath, fever.      Physical Exam   Temp: 96.4  F (35.8  C) Temp src: Axillary BP: 146/85 Pulse: 78 Heart Rate: 68 Resp: 16 SpO2: 96 % O2 Device: None (Room air)    Vitals:    01/24/18 1144 01/26/18 0900   Weight: 59.8 kg (131 lb 14.4 oz) 59.7 kg (131 lb 9.6 oz)     Vital Signs with Ranges  Temp:  [96.3  F (35.7  C)-96.8  F (36  C)] 96.4  F (35.8  C)  Pulse:  [78] 78  Heart Rate:  [68-78] 68  Resp:  [16-18] 16  BP: (138-150)/(75-85) 146/85  SpO2:  [95 %-97 %] 96 %  I/O last 3 completed shifts:  In: 1179.32 [P.O.:100; I.V.:512.92]  Out: -   Constitutional: Thin, pleasant male seen reclined in bed. He is alert, interactive.   HEENT: NC/AT. OP pink and dry.  Respiratory: Breathing even and non-labored on RA. Able to sit up for exam. Lungs CTA b/l, no wheezing or crackles.  Cardiovascular: RRR, no murmur. No peripheral edema.   GI:  Normoactive bowel sounds, no distention, no guarding, soft and nontender.  Skin: Clean, dry, intact.   Musculoskeletal: Extremities thin, grossly normal in appearance.   Neurologic: Alert and oriented. Speech normal. Grossly nonfocal.  Neuropsychiatric: Calm, mentation appears normal, affect congruent.        Medications     IV fluid REPLACEMENT ONLY       parenteral nutrition - ADULT compounded formula 50 mL/hr at 01/27/18 0659     - MEDICATION INSTRUCTIONS -         artificial saliva  5-10 mL Swish & Spit 4x Daily     methylPREDNISolone  100 mg Intravenous Daily     famotidine  20 mg Intravenous Q24H     enoxaparin  40 mg Subcutaneous Q24H     lipids   250 mL Intravenous Once per day on Mon Tue Wed Thu Fri     lipids  250 mL Intravenous Once     allopurinol  300 mg Oral Daily     metoprolol succinate (TOPROL-XL) 24 hr tablet 100 mg  100 mg Oral Daily     aspirin EC  161 mg Oral Daily     sodium chloride (PF)  10 mL Intracatheter Q7 Days       Data   Results for orders placed or performed during the hospital encounter of 01/24/18 (from the past 24 hour(s))   Basic metabolic panel   Result Value Ref Range    Sodium 138 133 - 144 mmol/L    Potassium 3.9 3.4 - 5.3 mmol/L    Chloride 100 94 - 109 mmol/L    Carbon Dioxide 20 20 - 32 mmol/L    Anion Gap 18 (H) 3 - 14 mmol/L    Glucose 437 (H) 70 - 99 mg/dL    Urea Nitrogen 49 (H) 7 - 30 mg/dL    Creatinine 1.67 (H) 0.66 - 1.25 mg/dL    GFR Estimate 41 (L) >60 mL/min/1.7m2    GFR Estimate If Black 49 (L) >60 mL/min/1.7m2    Calcium 6.8 (L) 8.5 - 10.1 mg/dL   Phosphorus   Result Value Ref Range    Phosphorus 3.2 2.5 - 4.5 mg/dL   Uric acid   Result Value Ref Range    Uric Acid <0.2 (L) 3.5 - 7.2 mg/dL   Lactate Dehydrogenase   Result Value Ref Range    Lactate Dehydrogenase 2083 (H) 85 - 227 U/L   Glucose by meter   Result Value Ref Range    Glucose 131 (H) 70 - 99 mg/dL   Glucose by meter   Result Value Ref Range    Glucose 135 (H) 70 - 99 mg/dL   Glucose by meter   Result Value Ref Range    Glucose 134 (H) 70 - 99 mg/dL   Basic metabolic panel   Result Value Ref Range    Sodium 143 133 - 144 mmol/L    Potassium 4.1 3.4 - 5.3 mmol/L    Chloride 107 94 - 109 mmol/L    Carbon Dioxide 16 (L) 20 - 32 mmol/L    Anion Gap 19 (H) 3 - 14 mmol/L    Glucose 142 (H) 70 - 99 mg/dL    Urea Nitrogen 53 (H) 7 - 30 mg/dL    Creatinine 1.67 (H) 0.66 - 1.25 mg/dL    GFR Estimate 41 (L) >60 mL/min/1.7m2    GFR Estimate If Black 49 (L) >60 mL/min/1.7m2    Calcium 7.8 (L) 8.5 - 10.1 mg/dL   Phosphorus   Result Value Ref Range    Phosphorus 3.4 2.5 - 4.5 mg/dL   Uric acid   Result Value Ref Range    Uric Acid 0.4 (L) 3.5 - 7.2 mg/dL   Lactate  Dehydrogenase   Result Value Ref Range    Lactate Dehydrogenase 2222 (H) 85 - 227 U/L   Calcium ionized   Result Value Ref Range    Calcium Ionized 4.1 (L) 4.4 - 5.2 mg/dL   Glucose by meter   Result Value Ref Range    Glucose 160 (H) 70 - 99 mg/dL   Glucose by meter   Result Value Ref Range    Glucose 125 (H) 70 - 99 mg/dL   CBC with platelets differential   Result Value Ref Range    WBC 7.9 4.0 - 11.0 10e9/L    RBC Count 3.97 (L) 4.4 - 5.9 10e12/L    Hemoglobin 12.1 (L) 13.3 - 17.7 g/dL    Hematocrit 37.8 (L) 40.0 - 53.0 %    MCV 95 78 - 100 fl    MCH 30.5 26.5 - 33.0 pg    MCHC 32.0 31.5 - 36.5 g/dL    RDW 14.6 10.0 - 15.0 %    Platelet Count 152 150 - 450 10e9/L    Diff Method Automated Method     % Neutrophils 79.5 %    % Lymphocytes 12.7 %    % Monocytes 7.3 %    % Eosinophils 0.0 %    % Basophils 0.1 %    % Immature Granulocytes 0.4 %    Nucleated RBCs 0 0 /100    Absolute Neutrophil 6.3 1.6 - 8.3 10e9/L    Absolute Lymphocytes 1.0 0.8 - 5.3 10e9/L    Absolute Monocytes 0.6 0.0 - 1.3 10e9/L    Absolute Eosinophils 0.0 0.0 - 0.7 10e9/L    Absolute Basophils 0.0 0.0 - 0.2 10e9/L    Abs Immature Granulocytes 0.0 0 - 0.4 10e9/L    Absolute Nucleated RBC 0.0    Magnesium   Result Value Ref Range    Magnesium 2.1 1.6 - 2.3 mg/dL   Basic metabolic panel   Result Value Ref Range    Sodium 143 133 - 144 mmol/L    Potassium 4.0 3.4 - 5.3 mmol/L    Chloride 105 94 - 109 mmol/L    Carbon Dioxide 21 20 - 32 mmol/L    Anion Gap 17 (H) 3 - 14 mmol/L    Glucose 118 (H) 70 - 99 mg/dL    Urea Nitrogen 59 (H) 7 - 30 mg/dL    Creatinine 1.59 (H) 0.66 - 1.25 mg/dL    GFR Estimate 43 (L) >60 mL/min/1.7m2    GFR Estimate If Black 52 (L) >60 mL/min/1.7m2    Calcium 8.0 (L) 8.5 - 10.1 mg/dL   Phosphorus   Result Value Ref Range    Phosphorus 3.1 2.5 - 4.5 mg/dL   Uric acid   Result Value Ref Range    Uric Acid 0.4 (L) 3.5 - 7.2 mg/dL   Lactate Dehydrogenase   Result Value Ref Range    Lactate Dehydrogenase 2401 (H) 85 - 227 U/L   INR    Result Value Ref Range    INR 1.21 (H) 0.86 - 1.14   Partial thromboplastin time   Result Value Ref Range    PTT 30 22 - 37 sec   Fibrinogen activity   Result Value Ref Range    Fibrinogen 218 200 - 420 mg/dL   Hepatic panel   Result Value Ref Range    Bilirubin Direct 0.1 0.0 - 0.2 mg/dL    Bilirubin Total 0.4 0.2 - 1.3 mg/dL    Albumin 2.7 (L) 3.4 - 5.0 g/dL    Protein Total 5.6 (L) 6.8 - 8.8 g/dL    Alkaline Phosphatase 60 40 - 150 U/L    ALT 24 0 - 70 U/L    AST 87 (H) 0 - 45 U/L   Calcium ionized   Result Value Ref Range    Calcium Ionized 4.2 (L) 4.4 - 5.2 mg/dL   Lactic acid   Result Value Ref Range    Lactic Acid 11.0 (HH) 0.4 - 2.0 mmol/L

## 2018-01-27 NOTE — PLAN OF CARE
Problem: Patient Care Overview  Goal: Plan of Care/Patient Progress Review  Outcome: No Change  Please check TLS labs. Full liq diet now. Pt was c/o abd bloating/distention after small full liq diet.tpn civi. bs 134 at 1100. Next bs check at 1700 today.up walking to b/r with sba. Family is with pt. Fall orecaution.

## 2018-01-27 NOTE — PLAN OF CARE
"Problem: Patient Care Overview  Goal: Plan of Care/Patient Progress Review  7D-PT: DEFER: PT consult received and appreciated. Extensive time spent in tangential conversation with pt - pt adamantly refusing PT, stating services are \"not warranted\". Significant time spent in education on role/rationale for PT during prolonged hospitalization, pt not receptive. Pt stating focus will be \"on cardiac and renal function not physical therapy\". CXL PT. Will complete PT order, but would be willing to re-initiate if/when pt is agreeable.       "

## 2018-01-27 NOTE — PLAN OF CARE
Problem: Patient Care Overview  Goal: Plan of Care/Patient Progress Review  Outcome: No Change  AVSS, trending high BP's. Afebrile. Denies SOB, nausea. Reports abdominal discomfort is comfortably manageable with rest. Briefs for incontinence, 1 episode tonight. Na Bicarb drip decreased from 50 to 25 mL/hr. TPN and lipids infusing. Continue monitoring and with POC.

## 2018-01-27 NOTE — PLAN OF CARE
"Problem: Patient Care Overview  Goal: Plan of Care/Patient Progress Review  Outcome: No Change  ADM: 1/24 remote large cell lymphoma 1994 treated with [facility]OP with interstitial pneumonitis from Cytoxan then obtained CR with [facility]. Follicular lymphoma in 2013 with BR and CR. Now with recent weight loss, abdominal fullness and CT imaging 1/16/2018 with pancreatic fullness and colon thickening and biopsy now showing Diffuse Large B Cell germinal center subtype. Admitted 1/24/2018  due to tumor lysis, BING, and treatment initiation.  /75 (BP Location: Left arm)  Pulse 78  Temp 96.7  F (35.9  C) (Oral)  Resp 16  Ht 1.727 m (5' 8\")  Wt 59.7 kg (131 lb 9.6 oz)  SpO2 97%  BMI 20.01 kg/m2  Neuro: WDL  Cardiac: WDL  Respiratory: WDL  GI/: Diarrhea- unknown cause, all test are negative  Diet/ appetite: NPO w/ icechips, tolerating well  Activity: up w/ SBA. Wife at bedside helps Pt  Pain: C/o abd distention  Skin: WDL  LDAs: R PICC infusing Sodium bicarb @25ml/hr, TPN& Lipids  Blood sugar: stable    Plan:Trending TLS labs        "

## 2018-01-28 NOTE — PLAN OF CARE
"Problem: Chemotherapy Effects (Adult)  Goal: Signs and Symptoms of Listed Potential Problems Will be Absent, Minimized or Managed (Chemotherapy Effects)  Signs and symptoms of listed potential problems will be absent, minimized or managed by discharge/transition of care (reference Chemotherapy Effects (Adult) CPG).     VSS. Afebrile. Abdominal pain has been manageable as long as he does not eat or drink anything. Does tolerate water in small amounts. Continues with loose stools per norm. Difficult to obtain strict I & O's as patient does not save his urine or stools for measuring. Lipids and TPN infusing. BS @ 1830 was 138. Next one due in 6 hours. Ambulated in the hallways x 2 with his wife and son who are here visiting. Continues to voice that he does not \"want or need physical therapy\" and that he will \"walk more.\" TLS labs being drawn Q 8 hours.       "

## 2018-01-28 NOTE — PLAN OF CARE
Problem: Patient Care Overview  Goal: Plan of Care/Patient Progress Review  Outcome: No Change  Patient continues to watch his labs and requesting numbers or printouts. Denies pain and nausea but limits intake to ensure comfort,Poor intake and output accuracy d/t diarrhea and unable to save urine.Questions on grease board for team from patient and family members .TPN Lipids started- glucose at approximately [phone].Continue with POC,

## 2018-01-28 NOTE — PLAN OF CARE
Problem: Patient Care Overview  Goal: Plan of Care/Patient Progress Review  Outcome: No Change  C/o abd bloating/discomfort after full liq intake.diarrhea cont. Given anti diarreha meds givenx1. Please asses needs of meds for diarrhea. Up walking in [place] without assist. Pt is fall precaution.need SBA with acitivty. 500ml ivf bolus given as ordered. tpn at 50ml/hr.

## 2018-01-28 NOTE — PROGRESS NOTES
"[facility], [place]    Hematology/Oncology Progress Note    Date of Service (when I saw the patient): 01/28/2018     Assessment & Plan   Tristen Beckwith is a 73 year old male with history of DLBCL (1994) and follicular lymphoma (2013) who now presents with recurrent DLBLC. He is found to have acute on chronic kidney injury with hyperuricemia. He was admitted on 1/24/18 for further evaluation and management prior to anticipated chemotherapy.      HEME:   #Recurrent DLBCL.   Presented with symptoms of epigastric/abdominal pain, bloating, and early satiety. CT A/P w/ IV contrast (1/16/18) demonstrated new marked enlargement of head and proximal body of pancreas with surrounding edema and diffuse wall thickening throughout the colon; findings concerning for lymphomatous involvement. He underwent colonoscopy with bx on 1/19; this demonstrated diffuse large B-cell lymphoma, germinal center subtype. FISH for BCL2, BCL6 and MYC pending. Met with Dr. Ernandez prior to admission. Initially working to optimize kidney function prior to considering chemotherapy as well as to complete staging with PET/CT imaging. Will need to avoid cytoxan (h/o interstitial pneumonitis) and adriamycin (cardiomyopathy).   - repeat ECHO (1/24) demonstrated EF 46%  - PICC line placed  - s/p PET/CT scan (1/25). This demonstrated diffuse hypermetabolic lymphoma infiltration throughout peritoneal cavity. Given extensiveness of disease and risk for bowel perforation, as well as high risk of TLS and further kidney injury, plan is to start \"pre-phase\" treatment slowly.   - initiated steroids with methylprednisolone 100mg IV on 1/25. Tolerating well so far. Will plan for total of 5 days; ordered daily thru 1/29/18  - pending tolerance and lab trend, will likely plan for Rituxan around 1/29  - further chemotherapy regimen to be determined pending tolerance of pre-phase treatment - tentatively planning for Tuesday    #Hyperuricemia, " concern for TLS.   - uric acid 14.7 on admission, now downtrended s/p rasburicase on 1/24 (results may still be affected by rasburicase pending lab collection process). Continue Allopurinol.   - continue tp follow TLS labs BID at this time  -TPN started, have discontinued IVF  -Full liquid diet    #Mild coagulopathy. INR mildly prolonged at 1.2. Likely 2/2 nutritional deficiency. PTT and fibrinogen WNL. No bleeding  - discussed adding Vit K to TPN (starting 1/26 PM)  - monitor coags with next check on 1/27, then INR q M/Th (pending trend can be monitoring more frequently if needed)     CV:  #CAD s/p stenting.  #H/o HTN, HLD  #Ischemic cardiomyopathy.  History of large anterior wall MI in 2000 s/p stenting of LAD. Resultant chronic systolic heart failure 2/2 ischemia. ECHO in 11/2017 demonstrated EF 48%. Most recent BNP was 569 (1/2/18 in care everywhere). Repeat ECHO prior to anticipated treatment demonstrates EF 46% (1/24/18).  - appreciate Cardiology consult to optimize cardiac status in setting of recurrent disease  - continue ASA at this time until Plt count <50K   - continue PTA metoprolol  - holding lipitor at this time (mild AST elevation and deciding plan for chemotherapy; add back when able)  - holding lisinopril given current renal dysfunction  - hold torsemide again given renal dysfunction at this time     RENAL:   #Acute on chronic kidney injury. Appears baseline (0511-0076) ranged 1.1-1.2 with GRF >60. More recently since 01/2017 has been 1.3-1.6 (GRF 50s to >60). Of note, did get IV contrast with CT on 1/16/18. Creatinine 2.31 on admission, likely exacerbated by hyperuricemia/TLS and dehydration (given poor PO intake). Cr now downtrending  - s/p rasburicase (1/24)  - UA with 1 hyaline cast  - lymphoma does not appear to be involving/affecting the kidneys based on PET/CT  - continue to trend with BMP q8hrs at this time, especially with continuing slow pre-phase treatment  - hold/limit nephrotoxic meds  as able  - appreciate Nephrology consult to optimize management pending trend of Cr with pre-phase treatment/chemotherapy  - strict I/Os  -TPN started 1/26    #Metabolic acidosis. 2/2 GI losses with watery diarrhea. Bicarb now stable.    GI:  #Abdominal bloating, distention, early satiety. 2/2 lymphomatous involvement.   - simethicone PRN     #Watery, mucous-like stools. Pt is s/p colonscopy on 1/19. Anticipate ongoing symptoms are due to lymphomatous involvement of the colon. Would like to avoid any bowel meds (anti-motility and stimulating laxatives) at this time as pt is also at risk for bowel perforation, especially as we begin treatment, due to lymphoma involvement.   - although less likely, did r/o infection with C.diff (negative) and enteric panel (negative)  -imodium prn    #Mild transaminitis. AST mildly elevated. On PET/CT there does not appear to be any substantial involvement in the liver.   - trend hepatic panel q48hrs.     ENDO:  #Hypoglycemia. RESOLVED. Overnight 1/24-1/25, BGs were 53-60s. Likely 2/2 poor PO intake throughout 1/24 followed by NPO status overnight (1/24-1/25) in anticipation of PET scan. S/p D50 x once, glucose gel.   - monitor BG with steroids now onboard (1/25-1/29)    FEN  -on full liquids, not tolerating very well, continue as tolerated  - monitor lytes, no current repletion protocol in place given BING. Monitor closely with start of TPN, concern for possible refeeding. Add standing lyte repletion pending trend of Cr.     PPx/Misc:  - continue ASA as above  - added Lovenox given refusal to ambulate frequently or work with PT  - add GI ppx with steroids now on board  - placed PT consult, patient is refusing     Pt seen and discussed with Dr. Fox.      Divina Gordon MD  Heme/Onc PGY5  01/28/2018        Interval History   Afebrile, no acute events overnight.  Refusing to work with physical therapy, stating he does not think she needs that.  Had some abdominal pain after eating a  full liquid diet.  Still having significant diarrhea.  Otherwise tolerating steroids well. Many questions today about future plan of chemotherapy.      Physical Exam   Temp: 97.1  F (36.2  C) Temp src: Oral BP: 159/89 Pulse: 77 Heart Rate: 80 Resp: 18 SpO2: 95 % O2 Device: None (Room air)    Vitals:    01/24/18 1144 01/26/18 0900 01/27/18 0900   Weight: 59.8 kg (131 lb 14.4 oz) 59.7 kg (131 lb 9.6 oz) 60.4 kg (133 lb 3.2 oz)     Vital Signs with Ranges  Temp:  [95.7  F (35.4  C)-97.1  F (36.2  C)] 97.1  F (36.2  C)  Pulse:  [77] 77  Heart Rate:  [70-82] 80  Resp:  [16-18] 18  BP: (142-159)/(73-91) 159/89  SpO2:  [95 %-100 %] 95 %  I/O last 3 completed shifts:  In: 1462 [I.V.:50]  Out: -   Constitutional: Thin, pleasant male seen reclined in bed. He is alert, interactive.   HEENT: NC/AT. OP pink and dry.  Respiratory: Breathing even and non-labored on RA. Able to sit up for exam. Lungs CTA b/l, no wheezing or crackles.  Cardiovascular: RRR, no murmur. No peripheral edema.   GI:  Normoactive bowel sounds, no distention, no guarding, soft and nontender.  Skin: Clean, dry, intact.   Musculoskeletal: Extremities thin, grossly normal in appearance.   Neurologic: Alert and oriented. Speech normal. Grossly nonfocal.  Neuropsychiatric: Calm, mentation appears normal, affect congruent.        Medications     parenteral nutrition - ADULT compounded formula 50 mL/hr at 01/27/18 2027     IV fluid REPLACEMENT ONLY       - MEDICATION INSTRUCTIONS -         artificial saliva  5-10 mL Swish & Spit 4x Daily     methylPREDNISolone  100 mg Intravenous Daily     famotidine  20 mg Intravenous Q24H     enoxaparin  40 mg Subcutaneous Q24H     lipids  250 mL Intravenous Once per day on Mon Tue Wed Thu Fri     lipids  250 mL Intravenous Once     allopurinol  300 mg Oral Daily     metoprolol succinate (TOPROL-XL) 24 hr tablet 100 mg  100 mg Oral Daily     aspirin EC  161 mg Oral Daily     sodium chloride (PF)  10 mL Intracatheter Q7 Days        Data   Results for orders placed or performed during the hospital encounter of 01/24/18 (from the past 24 hour(s))   Glucose by meter   Result Value Ref Range    Glucose 134 (H) 70 - 99 mg/dL   Basic metabolic panel   Result Value Ref Range    Sodium 143 133 - 144 mmol/L    Potassium 4.0 3.4 - 5.3 mmol/L    Chloride 103 94 - 109 mmol/L    Carbon Dioxide 19 (L) 20 - 32 mmol/L    Anion Gap 20 (H) 3 - 14 mmol/L    Glucose 118 (H) 70 - 99 mg/dL    Urea Nitrogen 57 (H) 7 - 30 mg/dL    Creatinine 1.47 (H) 0.66 - 1.25 mg/dL    GFR Estimate 47 (L) >60 mL/min/1.7m2    GFR Estimate If Black 57 (L) >60 mL/min/1.7m2    Calcium 8.4 (L) 8.5 - 10.1 mg/dL   Phosphorus   Result Value Ref Range    Phosphorus 2.9 2.5 - 4.5 mg/dL   Uric acid   Result Value Ref Range    Uric Acid 0.2 (L) 3.5 - 7.2 mg/dL   Lactate Dehydrogenase   Result Value Ref Range    Lactate Dehydrogenase 2493 (H) 85 - 227 U/L   Glucose by meter   Result Value Ref Range    Glucose 138 (H) 70 - 99 mg/dL   Basic metabolic panel   Result Value Ref Range    Sodium 142 133 - 144 mmol/L    Potassium 4.2 3.4 - 5.3 mmol/L    Chloride 102 94 - 109 mmol/L    Carbon Dioxide 18 (L) 20 - 32 mmol/L    Anion Gap 21 (H) 3 - 14 mmol/L    Glucose 129 (H) 70 - 99 mg/dL    Urea Nitrogen 63 (H) 7 - 30 mg/dL    Creatinine 1.39 (H) 0.66 - 1.25 mg/dL    GFR Estimate 50 (L) >60 mL/min/1.7m2    GFR Estimate If Black 61 >60 mL/min/1.7m2    Calcium 8.3 (L) 8.5 - 10.1 mg/dL   Phosphorus   Result Value Ref Range    Phosphorus 3.4 2.5 - 4.5 mg/dL   Uric acid   Result Value Ref Range    Uric Acid 0.4 (L) 3.5 - 7.2 mg/dL   Lactate Dehydrogenase   Result Value Ref Range    Lactate Dehydrogenase 2728 (H) 85 - 227 U/L   Glucose by meter   Result Value Ref Range    Glucose 156 (H) 70 - 99 mg/dL   CBC with platelets differential   Result Value Ref Range    WBC 7.8 4.0 - 11.0 10e9/L    RBC Count 3.77 (L) 4.4 - 5.9 10e12/L    Hemoglobin 11.7 (L) 13.3 - 17.7 g/dL    Hematocrit 35.9 (L) 40.0 - 53.0 %     MCV 95 78 - 100 fl    MCH 31.0 26.5 - 33.0 pg    MCHC 32.6 31.5 - 36.5 g/dL    RDW 14.7 10.0 - 15.0 %    Platelet Count 138 (L) 150 - 450 10e9/L    Diff Method Automated Method     % Neutrophils 81.6 %    % Lymphocytes 8.3 %    % Monocytes 9.2 %    % Eosinophils 0.0 %    % Basophils 0.0 %    % Immature Granulocytes 0.9 %    Nucleated RBCs 0 0 /100    Absolute Neutrophil 6.4 1.6 - 8.3 10e9/L    Absolute Lymphocytes 0.7 (L) 0.8 - 5.3 10e9/L    Absolute Monocytes 0.7 0.0 - 1.3 10e9/L    Absolute Eosinophils 0.0 0.0 - 0.7 10e9/L    Absolute Basophils 0.0 0.0 - 0.2 10e9/L    Abs Immature Granulocytes 0.1 0 - 0.4 10e9/L    Absolute Nucleated RBC 0.0    Magnesium   Result Value Ref Range    Magnesium 2.3 1.6 - 2.3 mg/dL   Basic metabolic panel   Result Value Ref Range    Sodium 140 133 - 144 mmol/L    Potassium 4.0 3.4 - 5.3 mmol/L    Chloride 100 94 - 109 mmol/L    Carbon Dioxide 21 20 - 32 mmol/L    Anion Gap 19 (H) 3 - 14 mmol/L    Glucose 114 (H) 70 - 99 mg/dL    Urea Nitrogen 62 (H) 7 - 30 mg/dL    Creatinine 1.29 (H) 0.66 - 1.25 mg/dL    GFR Estimate 55 (L) >60 mL/min/1.7m2    GFR Estimate If Black 66 >60 mL/min/1.7m2    Calcium 8.4 (L) 8.5 - 10.1 mg/dL   Phosphorus   Result Value Ref Range    Phosphorus 3.1 2.5 - 4.5 mg/dL   Uric acid   Result Value Ref Range    Uric Acid 0.6 (L) 3.5 - 7.2 mg/dL   Lactate Dehydrogenase   Result Value Ref Range    Lactate Dehydrogenase 2562 (H) 85 - 227 U/L

## 2018-01-29 NOTE — PLAN OF CARE
Problem: Patient Care Overview  Goal: Plan of Care/Patient Progress Review  Outcome: No Change  Pt afebrile. BPs elevated ?R/T rituxan (see chemo note). Otherwise, pt sleeping much of day R/T rituxan premeds (benadryl). C/o abd pain. Received tramadol with some relief. Probably more relief from having BMs.   Continues on TPN. Taking minimal full liquid po. Wife at bedside, very supportive.

## 2018-01-29 NOTE — PROGRESS NOTES
Chemo note:  D/I: pt received tylenol and benadryl as premeds. Also receiving solumedrol as part of treatment protocol. Brisk blood return from PICC . Rituxan initiated at 50cc/hr.    A/P: Pt c/o abd pain. 's /100's. Rituxan stopped. NP notified. No other symptoms. Rituxan restarted at 25cc/hr. Pain resolved with tramadol and having a BM. BP came down to 150's/80's. Rituxan increased back up to 50cc/hr. BP back up to 160's/100's. NP notified. Pt received hydralazine 10mg po. Rituxan remains at 50cc/hr. Premeds will be repeated as infusion time lengthy due to BP.  Pt c/o nausea. Received compazine with some relief. Continue to monitor BP and  for side effects.

## 2018-01-29 NOTE — PLAN OF CARE
Problem: Patient Care Overview  Goal: Plan of Care/Patient Progress Review  Outcome: No Change  Patient had 2 loose stools but declined imodium over night-reports will take it later in day. Patient  eager to start rituxan.Continue with POC

## 2018-01-29 NOTE — PLAN OF CARE
Problem: Patient Care Overview  Goal: Plan of Care/Patient Progress Review  Afebrile. Vitals are stable. Patient had 3 BMs this shift. Imodium 2 mg given x 2. TPN running @ 50 ml/hr. Blood sugars checked per order. Wife at bedside. Continue to monitor.

## 2018-01-29 NOTE — PROGRESS NOTES
DATE/TIME  (DOT-TD, DOT-NOW) CHEMO CHECK ACTIVITY (REGIMEN & DOSE CHECK, DAY, DOSE #, NAME OF CHEMO #1)  CHEMO DRUG #2  CHEMO DRUG #3 NAME OF RN #1 (USE DOT-ME HERE) NAME OF RN#2 (2ND RN TO LOG IN SEPARATELY)   1/28/2018  8:46 PM  Rituxan regimen protocol double check   Joaquin Freedman     1/29/2018  9:21 AM   rituxan   Shayna Cruz     1/30/2018  3:57 PM   R-DHAX protocol double check   Suresh Aldana     1/30/2018  6:19 PM Oxaliplatin dose double check   ( Suresh Aldana    1/31/2018  9:02 AM   Dose 1 cytarabine    Shayna Davis   (trell)   1/31/2018   4:16 PM  Dose #2 Cytarabine    Alma Soto

## 2018-01-29 NOTE — PROGRESS NOTES
"[facility], [place]    Hematology/Oncology Progress Note    Date of Service (when I saw the patient): 01/29/2018     Assessment & Plan   Tristen Beckwith is a 73 year old man with history of DLBCL (1994) and follicular lymphoma (2013) who now presents with recurrent DLBLC. He is found to have acute on chronic kidney injury with hyperuricemia. He was admitted on 1/24/18 for further evaluation and management prior to anticipated chemotherapy.     Plan today:  -continue MP through today  -give rituxan   -tramadol prn pain  -imodium prn diarrhea     HEME:   #Recurrent DLBCL  Presented with symptoms of epigastric/abdominal pain, bloating, and early satiety. CT A/P w/ IV contrast (1/16/18) demonstrated new marked enlargement of head and proximal body of pancreas with surrounding edema and diffuse wall thickening throughout the colon; findings concerning for lymphomatous involvement. He underwent colonoscopy with bx on 1/19; this demonstrated diffuse large B-cell lymphoma, germinal center subtype. FISH for BCL2, BCL6 and MYC pending. Met with Dr. Ernandez prior to admission. Initially working to optimize kidney function prior to considering chemotherapy as well as to complete staging with PET/CT imaging. Will need to avoid cytoxan (h/o interstitial pneumonitis) and adriamycin (cardiomyopathy).   - repeat ECHO (1/24) demonstrated EF 46%  - PICC line placed  - s/p PET/CT scan (1/25). This demonstrated diffuse hypermetabolic lymphoma infiltration throughout peritoneal cavity. Given extensiveness of disease and risk for bowel perforation, as well as high risk of TLS and further kidney injury, plan is to start \"pre-phase\" treatment slowly.   - initiated steroids with methylprednisolone 100mg IV on 1/25. Tolerating well so far. Will plan for total of 5 days; ordered daily thru 1/29/18  - started rituxan today 1/29 - start at 50ml/hr and work way up slowly if tolerating  - further chemotherapy regimen to " be determined pending tolerance of pre-phase treatment - tentatively planning for Tuesday     #Hyperuricemia, concern for TLS  Uric acid 14.7 on admission, now wnl s/p rasburicase on 1/24  - continue allopurinol  - continue to follow TLS labs BID at this time  - TPN started, have discontinued IVF  - Full liquid diet    #Mild coagulopathy  Likely 2/2 nutritional deficiency.   - discussed adding Vit K to TPN (starting 1/26 PM)  - monitor coags q M/Th (pending trend can be monitoring more frequently if needed)      CV:  #CAD s/p stenting  #H/o HTN, HLD  #Ischemic cardiomyopathy  History of large anterior wall MI in 2000 s/p stenting of LAD. Resultant chronic systolic heart failure 2/2 ischemia. ECHO in 11/2017 demonstrated EF 48%. Most recent BNP was 569 (1/2/18 in care everywhere). Repeat ECHO prior to anticipated treatment demonstrates EF 46% (1/24/18).  - appreciate Cardiology consult to optimize cardiac status in setting of recurrent disease  - continue ASA at this time until Plt count <50K   - continue PTA metoprolol  - holding lipitor at this time (mild AST elevation and deciding plan for chemotherapy; add back when able)  - holding lisinopril given current renal dysfunction  - hold torsemide again given renal dysfunction at this time     RENAL:   #Acute on chronic kidney injury  Appears baseline (1489-7928) ranged 1.1-1.2 with GRF >60. More recently since 01/2017 has been 1.3-1.6 (GRF 50s to >60). Of note, did get IV contrast with CT on 1/16/18. Creatinine 2.31 on admission, likely exacerbated by hyperuricemia/TLS and dehydration (given poor PO intake). Cr now downtrending  - s/p rasburicase (1/24)  - UA with 1 hyaline cast  - lymphoma does not appear to be involving/affecting the kidneys based on PET/CT  - continue to trend with BMP q8hrs at this time, especially with continuing slow pre-phase treatment  - hold/limit nephrotoxic meds as able  - appreciate Nephrology consult to optimize management pending trend  of Cr with pre-phase treatment/chemotherapy  - strict I/Os  - TPN started 1/26    #Metabolic acidosis  2/2 GI losses with watery diarrhea. Bicarb now stable.    GI:  #Abdominal bloating, distention, early satiety  2/2 lymphomatous involvement.   - simethicone and tramadol PRN discomfort or pain    #Watery, mucous-like stools.   Pt is s/p colonscopy on 1/19. Anticipate ongoing symptoms are due to lymphomatous involvement of the colon. Would like to avoid any bowel meds (anti-motility and stimulating laxatives) at this time as pt is also at risk for bowel perforation, especially as we begin treatment, due to lymphoma involvement.   - although less likely, did r/o infection with C.diff (negative) and enteric panel (negative)  - imodium prn    #Mild transaminitis.   AST mildly elevated. On PET/CT there does not appear to be any substantial involvement in the liver.   - trend hepatic panel q48hrs    ENDO:  #Hypoglycemia - RESOLVED  Overnight 1/24-1/25, BGs were 53-60s. Likely 2/2 poor PO intake throughout 1/24 followed by NPO status overnight (1/24-1/25) in anticipation of PET scan. S/p D50 x once, glucose gel.   - monitor BG with steroids now on board (1/25-1/29)    FEN  - on full liquids, not tolerating very well, continue as tolerated  - monitor lytes, no current repletion protocol in place given BING. Monitor closely with start of TPN, concern for possible refeeding. Add standing lyte repletion pending trend of Cr.     PPx/Misc:  - continue ASA as above  - added Lovenox ppx  - add GI ppx with steroids now on board  - placed PT consult, patient is declining to participate     Amparo Ontiveros DNP, APRN, CNP  Hematology/Oncology  Pager: [phone]    Interval History   Afebrile, no acute events overnight. Drowsy this AM after getting benadryl pre-med for rituxan. Reports abdominal discomfort. Minimal PO intake with some GI sx, bloating, pain. Having loose stools, using imodium for this.    Physical Exam   Temp: 95.1  F  (35.1  C) Temp src: Axillary BP: 154/89 Pulse: 90 Heart Rate: 94 Resp: 22 SpO2: 94 % O2 Device: None (Room air)    Vitals:    01/27/18 0900 01/28/18 0824 01/29/18 0735   Weight: 60.4 kg (133 lb 3.2 oz) 60.6 kg (133 lb 11.2 oz) 61.3 kg (135 lb 3.2 oz)     Vital Signs with Ranges  Temp:  [95.1  F (35.1  C)-97.6  F (36.4  C)] 95.1  F (35.1  C)  Pulse:  [89-90] 90  Heart Rate:  [76-94] 94  Resp:  [18-22] 22  BP: (144-171)/() 154/89  SpO2:  [94 %-98 %] 94 %  I/O last 3 completed shifts:  In: 620 [P.O.:200; I.V.:20]  Out: -   Constitutional: Pleasant man, thin, seen resting in bed in NAD.  Alert, interactive.   HEENT: NC/AT. MMM.  Respiratory: Breathing even and non-labored on RA. Able to sit up for exam. Lungs CTA b/l, no wheezing or crackles.  Cardiovascular: RRR, no murmur. No peripheral edema.   GI:  Normoactive bowel sounds. Abd soft, non-distended, mildly diffusely tender.   Skin: Clean, dry, intact.   Musculoskeletal: Extremities thin, o/w grossly normal in appearance.   Neurologic: Alert and oriented. Speech normal. No focal deficits.   Neuropsychiatric: Calm, mentation appears normal, affect congruent.        Medications     parenteral nutrition - ADULT compounded formula       - MEDICATION INSTRUCTIONS -       NaCl       parenteral nutrition - ADULT compounded formula 50 mL/hr at 01/28/18 5755     IV fluid REPLACEMENT ONLY       - MEDICATION INSTRUCTIONS -         artificial saliva  5-10 mL Swish & Spit 4x Daily     famotidine  20 mg Intravenous Q24H     enoxaparin  40 mg Subcutaneous Q24H     lipids  250 mL Intravenous Once per day on Mon Tue Wed Thu Fri     allopurinol  300 mg Oral Daily     metoprolol succinate (TOPROL-XL) 24 hr tablet 100 mg  100 mg Oral Daily     aspirin EC  161 mg Oral Daily     sodium chloride (PF)  10 mL Intracatheter Q7 Days       Data   Results for orders placed or performed during the hospital encounter of 01/24/18 (from the past 24 hour(s))   Basic metabolic panel   Result  Value Ref Range    Sodium 137 133 - 144 mmol/L    Potassium 4.2 3.4 - 5.3 mmol/L    Chloride 97 94 - 109 mmol/L    Carbon Dioxide 20 20 - 32 mmol/L    Anion Gap 20 (H) 3 - 14 mmol/L    Glucose 114 (H) 70 - 99 mg/dL    Urea Nitrogen 60 (H) 7 - 30 mg/dL    Creatinine 1.21 0.66 - 1.25 mg/dL    GFR Estimate 59 (L) >60 mL/min/1.7m2    GFR Estimate If Black 71 >60 mL/min/1.7m2    Calcium 8.5 8.5 - 10.1 mg/dL   Lactate Dehydrogenase   Result Value Ref Range    Lactate Dehydrogenase 2640 (H) 85 - 227 U/L   Phosphorus   Result Value Ref Range    Phosphorus 3.4 2.5 - 4.5 mg/dL   Uric acid   Result Value Ref Range    Uric Acid 0.3 (L) 3.5 - 7.2 mg/dL   Glucose by meter   Result Value Ref Range    Glucose 134 (H) 70 - 99 mg/dL   CBC with platelets differential   Result Value Ref Range    WBC 5.8 4.0 - 11.0 10e9/L    RBC Count 3.71 (L) 4.4 - 5.9 10e12/L    Hemoglobin 11.8 (L) 13.3 - 17.7 g/dL    Hematocrit 34.7 (L) 40.0 - 53.0 %    MCV 94 78 - 100 fl    MCH 31.8 26.5 - 33.0 pg    MCHC 34.0 31.5 - 36.5 g/dL    RDW 14.5 10.0 - 15.0 %    Platelet Count 116 (L) 150 - 450 10e9/L    Diff Method Automated Method     % Neutrophils 82.2 %    % Lymphocytes 11.4 %    % Monocytes 5.5 %    % Eosinophils 0.0 %    % Basophils 0.2 %    % Immature Granulocytes 0.7 %    Nucleated RBCs 0 0 /100    Absolute Neutrophil 4.8 1.6 - 8.3 10e9/L    Absolute Lymphocytes 0.7 (L) 0.8 - 5.3 10e9/L    Absolute Monocytes 0.3 0.0 - 1.3 10e9/L    Absolute Eosinophils 0.0 0.0 - 0.7 10e9/L    Absolute Basophils 0.0 0.0 - 0.2 10e9/L    Abs Immature Granulocytes 0.0 0 - 0.4 10e9/L    Absolute Nucleated RBC 0.0    Comprehensive metabolic panel   Result Value Ref Range    Sodium 138 133 - 144 mmol/L    Potassium 4.2 3.4 - 5.3 mmol/L    Chloride 99 94 - 109 mmol/L    Carbon Dioxide 20 20 - 32 mmol/L    Anion Gap 20 (H) 3 - 14 mmol/L    Glucose 119 (H) 70 - 99 mg/dL    Urea Nitrogen 58 (H) 7 - 30 mg/dL    Creatinine 1.14 0.66 - 1.25 mg/dL    GFR Estimate 63 >60  mL/min/1.7m2    GFR Estimate If Black 76 >60 mL/min/1.7m2    Calcium 8.4 (L) 8.5 - 10.1 mg/dL    Bilirubin Total 0.5 0.2 - 1.3 mg/dL    Albumin 2.8 (L) 3.4 - 5.0 g/dL    Protein Total 5.4 (L) 6.8 - 8.8 g/dL    Alkaline Phosphatase 61 40 - 150 U/L    ALT 26 0 - 70 U/L    AST 86 (H) 0 - 45 U/L   Glucose by meter   Result Value Ref Range    Glucose 118 (H) 70 - 99 mg/dL   CBC with platelets differential   Result Value Ref Range    WBC 5.9 4.0 - 11.0 10e9/L    RBC Count 3.83 (L) 4.4 - 5.9 10e12/L    Hemoglobin 11.6 (L) 13.3 - 17.7 g/dL    Hematocrit 36.3 (L) 40.0 - 53.0 %    MCV 95 78 - 100 fl    MCH 30.3 26.5 - 33.0 pg    MCHC 32.0 31.5 - 36.5 g/dL    RDW 14.7 10.0 - 15.0 %    Platelet Count 132 (L) 150 - 450 10e9/L    Diff Method Manual Differential     % Neutrophils 90.3 %    % Lymphocytes 4.4 %    % Monocytes 4.4 %    % Eosinophils 0.0 %    % Basophils 0.0 %    % Myelocytes 0.9 %    Absolute Neutrophil 5.3 1.6 - 8.3 10e9/L    Absolute Lymphocytes 0.3 (L) 0.8 - 5.3 10e9/L    Absolute Monocytes 0.3 0.0 - 1.3 10e9/L    Absolute Eosinophils 0.0 0.0 - 0.7 10e9/L    Absolute Basophils 0.0 0.0 - 0.2 10e9/L    Absolute Myelocytes 0.1 (H) 0 10e9/L    RBC Morphology Normal     Platelet Estimate Confirming automated cell count    Magnesium   Result Value Ref Range    Magnesium 2.4 (H) 1.6 - 2.3 mg/dL   INR   Result Value Ref Range    INR 1.18 (H) 0.86 - 1.14   Hepatic panel   Result Value Ref Range    Bilirubin Direct 0.2 0.0 - 0.2 mg/dL    Bilirubin Total 0.5 0.2 - 1.3 mg/dL    Albumin 2.7 (L) 3.4 - 5.0 g/dL    Protein Total 5.2 (L) 6.8 - 8.8 g/dL    Alkaline Phosphatase 57 40 - 150 U/L    ALT 24 0 - 70 U/L    AST 79 (H) 0 - 45 U/L   Triglycerides   Result Value Ref Range    Triglycerides 241 (H) <150 mg/dL   Basic metabolic panel   Result Value Ref Range    Sodium 140 133 - 144 mmol/L    Potassium 4.1 3.4 - 5.3 mmol/L    Chloride 99 94 - 109 mmol/L    Carbon Dioxide 20 20 - 32 mmol/L    Anion Gap 22 (H) 3 - 14 mmol/L     Glucose 108 (H) 70 - 99 mg/dL    Urea Nitrogen 59 (H) 7 - 30 mg/dL    Creatinine 1.15 0.66 - 1.25 mg/dL    GFR Estimate 63 >60 mL/min/1.7m2    GFR Estimate If Black 76 >60 mL/min/1.7m2    Calcium 8.4 (L) 8.5 - 10.1 mg/dL   Lactate Dehydrogenase   Result Value Ref Range    Lactate Dehydrogenase 2403 (H) 85 - 227 U/L   Phosphorus   Result Value Ref Range    Phosphorus 3.6 2.5 - 4.5 mg/dL   Uric acid   Result Value Ref Range    Uric Acid 1.0 (L) 3.5 - 7.2 mg/dL   Prealbumin   Result Value Ref Range    Prealbumin 11 (L) 15 - 45 mg/dL

## 2018-01-30 NOTE — PLAN OF CARE
Problem: Patient Care Overview  Goal: Plan of Care/Patient Progress Review  Outcome: No Change  Pt afebrile, vss. BP improved today. Lactic acid level 7.9. Provider notified. Rapid response called. But pt quite stable. Received rituxan yesterday with large tumor burden. Vs monitored, and pt recieved 1L IVF (over 8hrs-cardiac hx). Lactic acid redraw 5.9. Provider notified again. Pt remains stable. Pt having small loose stools. 1 brown with blood streaks. 1 brown without blood. Hgb stable. Pt reports abd pain improved today over yesterday. No pain meds requested. Up walking in [place] with SBA. Chemotherapy this evening.

## 2018-01-30 NOTE — PLAN OF CARE
Problem: Chemotherapy Effects (Adult)  Goal: Signs and Symptoms of Listed Potential Problems Will be Absent, Minimized or Managed (Chemotherapy Effects)  Signs and symptoms of listed potential problems will be absent, minimized or managed by discharge/transition of care (reference Chemotherapy Effects (Adult) CPG).   Outcome: No Change  Afebrile, VSS. Pt was lethargic and slept majority of shift related to Benadryl administered for Rituxan infusion. Pt had medium bloody stool at 1700. Stool was watery, fibrous and bright red. Pt denied increase in abd pain and VS remained stable afterwards. Moonlighter was notified and CBC was rechecked as well as a colorectal surgery consult.  Pt's spouse informed this RN later that pt had additional small diarrhea with no blood however staff did not witness. Rituxan finished at 1030. Plan to consider further chemotherapy tomorrow.

## 2018-01-30 NOTE — PROGRESS NOTES
"Colorectal Progress Note    ID 73 year old with diffuse b cell lymphoma involving peritoneum, one episdoe BRBPR last night, consult for bowel perf    S: NAEO. Ongoing low volume loose stool, somewhat red/bloody with wiping    O:  /74 (BP Location: Left arm)  Pulse 90  Temp 97.5  F (36.4  C) (Oral)  Resp 20  Ht 1.727 m (5' 8\")  Wt 61.3 kg (135 lb 3.2 oz)  SpO2 96%  BMI 20.56 kg/m2    NAD, alert, oriented  NLB on RA  Soft, non-tender, non-distended  WWP    A/P: 73 year old with lymphoma heavily involving bowel/peritoneum started on rituximab yesterday, one episode of BRBPR with ongoing low volume hematochezia with reassuring abdominal exam, vitals, no overt signs of perforation at this time  - Trend Hgb  - Decisions regarding Rituximab pending morning labs, primary team to manage    Seen and discussed with Dr. Wall, to be discussed with staff    Geovanna Marin MD  PGY 1 CRS  "

## 2018-01-30 NOTE — PROGRESS NOTES
[facility] [place]    Sepsis Evaluation Progress Note    Date of Service: 01/30/2018    I was called to see Tristen Beckwith due to elevated lactic acid. He is not known to have an infection.     Physical Exam    Vital Signs:  Temp: 95.1  F (35.1  C) Temp src: Axillary BP: 149/85   Heart Rate: 67 Resp: 18 SpO2: 97 % O2 Device: None (Room air)      Lab:  Lactic Acid   Date Value Ref Range Status   01/30/2018 5.9 (HH) 0.7 - 2.0 mmol/L Final     Comment:     A critical value was identified while performing another test.  Critical value   called to and read back by  DREW JOLLEY AT 1154 01/30/18 BY          The patient is at baseline mental status.    The rest of their physical exam is significant for mild hypertension, mild abdominal bloating/tenderness.    Assessment and Plan    The SIRS and exam findings are likely due to diffuse large B cell lymphoma and cytotoxic therapy, there is no sign of sepsis at this time.    Disposition: The patient will remain on the current unit. We will continue to monitor this patient closely.    NAPOLEON Donohue CNP

## 2018-01-30 NOTE — CONSULTS
Colon and Rectal Surgery Consultation Note  [facility]    Tristen Beckwith MRN# [id number]   Age: 73 year old YOB: 1945     Date of Admission:  1/24/2018    Reason for consult: BRB       Requesting physician: ED       Level of consult: One-time consult to assist in determining a diagnosis and to recommend an appropriate treatment plan           Assessment:   73 year old with diffuse periotneal/bowel lymphoma with one episode of BRBPR, concern for development of obstruction, perforation 2/2 cancer without any operative interventions available.         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 The patient has been in remission of his diffuse large B-cell lymphoma ever since the initial therapy; however, he had follicular lymphoma component relapse on several occasions.  He was seen at an OSH for night sweats, fatigue. It showed a large abdominal mass mostly involving the pancreas and the pancreatic tissue, multiple enlarged mesenteric lymph nodes, and thickening of the intestines and colon all the way through the transverse colon with a thick bowel wall. He underwent colonoscopy last week with a transcolonic biopsy of the lymphoid tissue with Dr. French which confirmed lymphoma. He is at risk for obstruction or perforation due to the extensive nature of this disease.            Past Medical History:     Past Medical History:   Diagnosis Date     Coronary artery disease      H/O percutaneous transluminal coronary angioplasty      History of acute anterior wall myocardial infarction              Past  "Surgical History:     Past Surgical History:   Procedure Laterality Date     COLONOSCOPY N/A 1/19/2018    Procedure: COLONOSCOPY;  colonscopy;  Surgeon: Cristofer French MD;  Location:  GI             Social History:     Social History     Social History     Marital status:      Spouse name: N/A     Number of children: N/A     Years of education: N/A     Occupational History     Not on file.     Social History Main Topics     Smoking status: Former Smoker     Smokeless tobacco: Never Used     Alcohol use Not on file     Drug use: Not on file     Sexual activity: Not on file     Other Topics Concern     Not on file     Social History Narrative             Family History:   No family history on file.          Allergies:      Allergies   Allergen Reactions     Cyclophosphamide Other (See Comments)     interstitial pneumonitis     Sulfa Drugs Rash             Medications:     Current Facility-Administered Medications on File Prior to Encounter:  sodium chloride (PF) 0.9% PF flush 62 mL   iopamidol (ISOVUE-370) 76% solution 86 mL   barium sulfate (READI-CAT) oral suspension 2.1% 450 mL   fluorodeoxyglucose F-18 (FDG) radioisotope injection 10.23 mCi     Current Outpatient Prescriptions on File Prior to Encounter:  torsemide (DEMADEX) 20 MG tablet Take 20 mg by mouth daily   aspirin 325 MG tablet Take 1/2 tablet daily   Nutritional Supplements (SOY PROTEIN SHAKE) POWD Take 1 dose. by mouth as needed Every other day   METOPROLOL SUCCINATE PO Take 100 mg by mouth daily.     lisinopril (PRINIVIL,ZESTRIL) 40 MG tablet Take 1 tablet by mouth daily.             Review of Systems:     Unable to assess secondary to patient drowsiness        Physical Exam:   /85 (BP Location: Left arm)  Pulse 90  Temp 96.3  F (35.7  C) (Axillary)  Resp 22  Ht 1.727 m (5' 8\")  Wt 61.3 kg (135 lb 3.2 oz)  SpO2 95%  BMI 20.56 kg/m2  General: Asleep, rousable, just received Benadryl per his wife at bedside  Resp: NLB on " RA  Cardiac: RRR  Abdomen: Soft, nondistended. Tender to deep palpation, worst in RLQ  Extremities: No LE edema or obvious joint abnormalities  Skin: Warm and dry, no jaundice or rash              Data:     Images reviewed in EPIC      Discussed with Dr. French who agrees with this plan.

## 2018-01-30 NOTE — PROGRESS NOTES
"[facility], [place]    Hematology/Oncology Progress Note    Date of Service (when I saw the patient): 01/30/2018     Assessment & Plan   Tristen Beckwith is a 73 year old man with history of DLBCL (1994) and follicular lymphoma (2013) who now presents with recurrent DLBLC. He is found to have acute on chronic kidney injury with hyperuricemia. He was admitted on 1/24/18 for further evaluation and management prior to anticipated chemotherapy.     Plan today:  -HOLD lovenox. Continue to monitor stools for blood.  -check TLS labs bid  -check hgb again this afternoon  -plan for chemo to start later today or tomorrow      HEME:   #Recurrent DLBCL  Presented with symptoms of epigastric/abdominal pain, bloating, and early satiety. CT A/P w/ IV contrast (1/16/18) demonstrated new marked enlargement of head and proximal body of pancreas with surrounding edema and diffuse wall thickening throughout the colon; findings concerning for lymphomatous involvement. He underwent colonoscopy with bx on 1/19; this demonstrated diffuse large B-cell lymphoma, germinal center subtype. FISH for BCL2, BCL6 and MYC pending. Met with Dr. Ernandez prior to admission. Initially working to optimize kidney function prior to considering chemotherapy as well as to complete staging with PET/CT imaging. Will need to avoid cytoxan (h/o interstitial pneumonitis) and adriamycin (cardiomyopathy).   - repeat ECHO (1/24) demonstrated EF 46%  - PICC line placed  - s/p PET/CT scan (1/25). This demonstrated diffuse hypermetabolic lymphoma infiltration throughout peritoneal cavity. Given extensiveness of disease and risk for bowel perforation, as well as high risk of TLS and further kidney injury, plan is to start \"pre-phase\" treatment slowly.   - initiated steroids with methylprednisolone 100mg IV on 1/25 and completed 1/29  - received rituxan 1/29, infused very slowly d/t mild reaction (HTN, clammy, nausea)  - further chemotherapy " regimen to be determined pending tolerance of pre-phase treatment    #Lactic acidosis without metabolic acidosis or sepsis.  2/2 lymphoma and cytotoxic therapy. Lactate actually trending down since first checked on 1/27 (11). Give IV fluids today and continue to monitor for new or concerning sx.     #Hyperuricemia, concern for TLS  Uric acid 14.7 on admission, now wnl s/p rasburicase on 1/24  - continue allopurinol  - continue to follow TLS labs BID at this time  - TPN started, have discontinued IVF  - Full liquid diet    #Mild coagulopathy  Likely 2/2 nutritional deficiency.   - discussed adding Vit K to TPN (starting 1/26 PM)  - monitor coags q M/Th (pending trend can be monitoring more frequently if needed)     #Hematochezia  Last episode on 1/29 PM. No blood in stools since then. Hgb stable. CRS eval'ed and no intervention at this time. Continue to monitor stools and hgb.      CV:  #CAD s/p stenting  #H/o HTN, HLD  #Ischemic cardiomyopathy  History of large anterior wall MI in 2000 s/p stenting of LAD. Resultant chronic systolic heart failure 2/2 ischemia. ECHO in 11/2017 demonstrated EF 48%. Most recent BNP was 569 (1/2/18 in care everywhere). Repeat ECHO prior to anticipated treatment demonstrates EF 46% (1/24/18).  - appreciate Cardiology consult to optimize cardiac status in setting of recurrent disease  - continue ASA at this time until Plt count <50K   - continue PTA metoprolol  - holding lipitor at this time (mild AST elevation and deciding plan for chemotherapy; add back when able)  - holding lisinopril given current renal dysfunction  - hold torsemide again given renal dysfunction at this time  - judicious use of IV fluids (do not run at rate more than 125cc/hr per patient)     RENAL:   #Acute on chronic kidney injury  Appears baseline (3489-6212) ranged 1.1-1.2 with GRF >60. More recently since 01/2017 has been 1.3-1.6 (GRF 50s to >60). Of note, did get IV contrast with CT on 1/16/18. Creatinine 2.31  on admission, likely exacerbated by hyperuricemia/TLS and dehydration (given poor PO intake). Cr now downtrending.  - s/p rasburicase (1/24)  - UA with 1 hyaline cast  - lymphoma does not appear to be involving/affecting the kidneys based on PET/CT  - continue to trend with BMP q8hrs at this time, especially with continuing slow pre-phase treatment  - hold/limit nephrotoxic meds as able  - appreciate Nephrology consult to optimize management pending trend of Cr with pre-phase treatment/chemotherapy  - strict I/Os  - TPN started 1/26    #Metabolic acidosis  2/2 GI losses with watery diarrhea. Bicarb now stable.    GI:  #Abdominal bloating, distention, early satiety  2/2 lymphomatous involvement.   - simethicone and tramadol PRN discomfort or pain    #Watery, mucous-like stools.   Pt is s/p colonscopy on 1/19. Anticipate ongoing symptoms are due to lymphomatous involvement of the colon. Would like to avoid any bowel meds (anti-motility and stimulating laxatives) at this time as pt is also at risk for bowel perforation, especially as we begin treatment, due to lymphoma involvement.   - although less likely, did r/o infection with C.diff (negative) and enteric panel (negative)  - imodium prn    #Mild transaminitis.   AST mildly elevated. On PET/CT there does not appear to be any substantial involvement in the liver.   - trend hepatic panel q48hrs    ENDO:  #Hypoglycemia - RESOLVED  Overnight 1/24-1/25 BGs were 53-60s. Likely 2/2 poor PO intake throughout 1/24 followed by NPO status overnight (1/24-1/25) in anticipation of PET scan. S/p D50 x once, glucose gel.   - monitor BG with steroids now on board (1/25-1/29)    FEN  - on full liquids, tolerating minimal PO intake, continue as tolerated  - monitor lytes, no current repletion protocol in place given BING. Monitor closely with start of TPN, concern for possible refeeding. Add standing lyte repletion pending trend of Cr.     PPx/Misc:  - continue ASA as above  - added  Lovenox ppx -- on HOLD for now with GIB  - add GI ppx with steroids now on board  - patient declined PT consult     Amparo Ontiveros DNP, APRN, CNP  Hematology/Oncology  Pager: [phone]    Interval History   Had 1 episode bloody stool last night. Hgb stable. Has had several BMs since then with no blood. No other e/o bleeding. Feeling better this AM. Feels that abdomen is softer, less tender. No fevers, sweats, chills, SOB. Tolerating small amounts of full liquids.     Physical Exam   Temp: 95.1  F (35.1  C) Temp src: Axillary BP: 149/85   Heart Rate: 67 Resp: 18 SpO2: 97 % O2 Device: None (Room air)    Vitals:    01/28/18 0824 01/29/18 0735 01/30/18 0945   Weight: 60.6 kg (133 lb 11.2 oz) 61.3 kg (135 lb 3.2 oz) 61.6 kg (135 lb 12.9 oz)     Vital Signs with Ranges  Temp:  [95  F (35  C)-97.5  F (36.4  C)] 95.1  F (35.1  C)  Heart Rate:  [67-93] 67  Resp:  [16-22] 18  BP: (135-163)/() 149/85  SpO2:  [95 %-100 %] 97 %  I/O last 3 completed shifts:  In: 3223.73 [P.O.:480; I.V.:690; IV Piggyback:640]  Out: -   Constitutional: Pleasant man, thin, seen resting in bed in NAD. Alert, interactive.   HEENT: NC/AT. MMM.  Respiratory: Breathing even and non-labored on RA. Able to sit up for exam. Lungs CTA b/l, no wheezing or crackles.  Cardiovascular: RRR, no murmur. No peripheral edema.   GI: Normoactive bowel sounds. Hyperactive in RUQ. Abd softer today, mildly distended, mildly diffusely tender.   Skin: Clean, dry, intact.   Musculoskeletal: Extremities thin, o/w grossly normal in appearance.   Neurologic: Alert and oriented. Speech normal. No focal deficits.   Neuropsychiatric: Calm, mentation appears normal, affect congruent.        Medications     NaCl       parenteral nutrition - ADULT compounded formula       parenteral nutrition - ADULT compounded formula 50 mL/hr at 01/29/18 2024     IV fluid REPLACEMENT ONLY       - MEDICATION INSTRUCTIONS -         sodium chloride 0.9%  1,000 mL Intravenous Once      artificial saliva  5-10 mL Swish & Spit 4x Daily     famotidine  20 mg Intravenous Q24H     lipids  250 mL Intravenous Once per day on Mon Tue Wed Thu Fri     allopurinol  300 mg Oral Daily     metoprolol succinate (TOPROL-XL) 24 hr tablet 100 mg  100 mg Oral Daily     aspirin EC  161 mg Oral Daily     sodium chloride (PF)  10 mL Intracatheter Q7 Days       Data   Results for orders placed or performed during the hospital encounter of 01/24/18 (from the past 24 hour(s))   EKG 12-lead, tracing only   Result Value Ref Range    Interpretation ECG Click View Image link to view waveform and result    Colorectal Surgery Adult IP Consult: Patient to be seen: Routine within 24 hrs; Call back #: [phone]; Pt w/DLBCL and diffuse gut involvement. Started rituxan today and developed bloody stools. High risk for bowel perforation. Intervention warrant...    Narrative    Geovanna Marin MD     1/29/2018 10:14 PM  Colon and Rectal Surgery Consultation Note  [facility]    Tristen Beckwith MRN# [id number]   Age: 73 year old YOB: 1945     Date of Admission:  1/24/2018    Reason for consult: BRB       Requesting physician: ED       Level of consult: One-time consult to assist in determining a   diagnosis and to recommend an appropriate treatment plan           Assessment:   73 year old with diffuse periotneal/bowel lymphoma with one   episode of BRBPR, concern for development of obstruction,   perforation 2/2 cancer without any operative interventions   available.         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 The patient has   been in remission of his diffuse large B-cell lymphoma ever since   the initial therapy; however, he had follicular lymphoma   component relapse on several occasions.  He was seen at an OSH   for night sweats, fatigue. It showed a large abdominal mass   mostly involving the pancreas and the pancreatic tissue, multiple   enlarged mesenteric lymph nodes, and thickening of the intestines   and colon all the way through the transverse colon with a thick   bowel wall. He underwent colonoscopy last week with a   transcolonic biopsy of the lymphoid tissue with Dr. French which   confirmed lymphoma. He is at risk for obstruction or perforation   due to the extensive nature of this disease.            Past Medical History:     Past Medical History:   Diagnosis Date     Coronary artery disease      H/O percutaneous transluminal coronary angioplasty      History of acute anterior wall myocardial infarction              Past Surgical History:     Past Surgical History:   Procedure Laterality Date     COLONOSCOPY N/A 1/19/2018    Procedure: COLONOSCOPY;  colonscopy;  Surgeon: Cristofer French MD;  Location:  GI             Social History:     Social History     Social History     Marital status:      Spouse name: N/A     Number of children: N/A     Years of education: N/A     Occupational History     Not on file.     Social History Main Topics     Smoking status: Former Smoker     Smokeless tobacco: Never Used     Alcohol use Not on file     Drug use: Not on file     Sexual activity: Not on file     Other Topics Concern     Not on file     Social History Narrative             Family History:   No family history on file.          Allergies:      Allergies   Allergen Reactions     Cyclophosphamide Other (See Comments)     interstitial pneumonitis     Sulfa Drugs Rash             Medications:     Current Facility-Administered Medications on File Prior to   Encounter:  sodium chloride (PF)  "0.9% PF flush 62 mL   iopamidol (ISOVUE-370) 76% solution 86 mL   barium sulfate (READI-CAT) oral suspension 2.1% 450 mL   fluorodeoxyglucose F-18 (FDG) radioisotope injection 10.23 mCi     Current Outpatient Prescriptions on File Prior to Encounter:  torsemide (DEMADEX) 20 MG tablet Take 20 mg by mouth daily   aspirin 325 MG tablet Take 1/2 tablet daily   Nutritional Supplements (SOY PROTEIN SHAKE) POWD Take 1 dose. by   mouth as needed Every other day   METOPROLOL SUCCINATE PO Take 100 mg by mouth daily.     lisinopril (PRINIVIL,ZESTRIL) 40 MG tablet Take 1 tablet by mouth   daily.             Review of Systems:     Unable to assess secondary to patient drowsiness        Physical Exam:   /85 (BP Location: Left arm)  Pulse 90  Temp 96.3  F (35.7    C) (Axillary)  Resp 22  Ht 1.727 m (5' 8\")  Wt 61.3 kg (135   lb 3.2 oz)  SpO2 95%  BMI 20.56 kg/m2  General: Asleep, rousable, just received Benadryl per his wife at   bedside  Resp: NLB on RA  Cardiac: RRR  Abdomen: Soft, nondistended. Tender to deep palpation, worst in   RLQ  Extremities: No LE edema or obvious joint abnormalities  Skin: Warm and dry, no jaundice or rash              Data:     Images reviewed in EPIC      Discussed with Dr. French who agrees with this plan.          Basic metabolic panel   Result Value Ref Range    Sodium 137 133 - 144 mmol/L    Potassium 4.8 3.4 - 5.3 mmol/L    Chloride 97 94 - 109 mmol/L    Carbon Dioxide 22 20 - 32 mmol/L    Anion Gap 19 (H) 3 - 14 mmol/L    Glucose 135 (H) 70 - 99 mg/dL    Urea Nitrogen 64 (H) 7 - 30 mg/dL    Creatinine 1.18 0.66 - 1.25 mg/dL    GFR Estimate 61 >60 mL/min/1.7m2    GFR Estimate If Black 73 >60 mL/min/1.7m2    Calcium 8.2 (L) 8.5 - 10.1 mg/dL   Lactate Dehydrogenase   Result Value Ref Range    Lactate Dehydrogenase 3169 (H) 85 - 227 U/L   Phosphorus   Result Value Ref Range    Phosphorus 6.1 (H) 2.5 - 4.5 mg/dL   Uric acid   Result Value Ref Range    Uric Acid 1.1 (L) 3.5 - 7.2 mg/dL "   CBC with platelets   Result Value Ref Range    WBC 10.8 4.0 - 11.0 10e9/L    RBC Count 4.17 (L) 4.4 - 5.9 10e12/L    Hemoglobin 12.9 (L) 13.3 - 17.7 g/dL    Hematocrit 39.6 (L) 40.0 - 53.0 %    MCV 95 78 - 100 fl    MCH 30.9 26.5 - 33.0 pg    MCHC 32.6 31.5 - 36.5 g/dL    RDW 14.6 10.0 - 15.0 %    Platelet Count 137 (L) 150 - 450 10e9/L   CBC with platelets differential   Result Value Ref Range    WBC 7.6 4.0 - 11.0 10e9/L    RBC Count 3.99 (L) 4.4 - 5.9 10e12/L    Hemoglobin 12.3 (L) 13.3 - 17.7 g/dL    Hematocrit 37.7 (L) 40.0 - 53.0 %    MCV 95 78 - 100 fl    MCH 30.8 26.5 - 33.0 pg    MCHC 32.6 31.5 - 36.5 g/dL    RDW 14.5 10.0 - 15.0 %    Platelet Count 110 (L) 150 - 450 10e9/L    Diff Method Automated Method     % Neutrophils 94.2 %    % Lymphocytes 3.0 %    % Monocytes 2.5 %    % Eosinophils 0.0 %    % Basophils 0.0 %    % Immature Granulocytes 0.3 %    Nucleated RBCs 0 0 /100    Absolute Neutrophil 7.2 1.6 - 8.3 10e9/L    Absolute Lymphocytes 0.2 (L) 0.8 - 5.3 10e9/L    Absolute Monocytes 0.2 0.0 - 1.3 10e9/L    Absolute Eosinophils 0.0 0.0 - 0.7 10e9/L    Absolute Basophils 0.0 0.0 - 0.2 10e9/L    Abs Immature Granulocytes 0.0 0 - 0.4 10e9/L    Absolute Nucleated RBC 0.0    Magnesium   Result Value Ref Range    Magnesium 2.7 (H) 1.6 - 2.3 mg/dL   Basic metabolic panel   Result Value Ref Range    Sodium 140 133 - 144 mmol/L    Potassium 4.4 3.4 - 5.3 mmol/L    Chloride 99 94 - 109 mmol/L    Carbon Dioxide 25 20 - 32 mmol/L    Anion Gap 16 (H) 3 - 14 mmol/L    Glucose 174 (H) 70 - 99 mg/dL    Urea Nitrogen 69 (H) 7 - 30 mg/dL    Creatinine 1.15 0.66 - 1.25 mg/dL    GFR Estimate 63 >60 mL/min/1.7m2    GFR Estimate If Black 76 >60 mL/min/1.7m2    Calcium 7.9 (L) 8.5 - 10.1 mg/dL   Lactate Dehydrogenase   Result Value Ref Range    Lactate Dehydrogenase 4900 (H) 85 - 227 U/L   Phosphorus   Result Value Ref Range    Phosphorus 5.8 (H) 2.5 - 4.5 mg/dL   Uric acid   Result Value Ref Range    Uric Acid 2.0 (L) 3.5  - 7.2 mg/dL   INR   Result Value Ref Range    INR 1.11 0.86 - 1.14   Partial thromboplastin time   Result Value Ref Range    PTT 25 22 - 37 sec   Fibrinogen activity   Result Value Ref Range    Fibrinogen 207 200 - 420 mg/dL   Lactic acid whole blood   Result Value Ref Range    Lactic Acid 7.9 (HH) 0.7 - 2.0 mmol/L   Blood gas venous   Result Value Ref Range    Ph Venous 7.40 7.32 - 7.43 pH    PCO2 Venous 50 40 - 50 mm Hg    PO2 Venous 32 25 - 47 mm Hg    Bicarbonate Venous 31 (H) 21 - 28 mmol/L    Base Excess Venous 5.3 mmol/L    FIO2 21    Lactic acid whole blood   Result Value Ref Range    Lactic Acid 5.9 (HH) 0.7 - 2.0 mmol/L

## 2018-01-30 NOTE — PLAN OF CARE
Problem: Patient Care Overview  Goal: Plan of Care/Patient Progress Review  Outcome: No Change  Patient was   Sleepy/somnulent overnight. Awoken with gentle shaking.Denied pain or nausea.Wife and son at bedside.Had a pink tinged stool/diarrhea that could indicate another rectal bleed..GI has seen patient and recommends no surgery and to monitor for more bleeding before more imaging,

## 2018-01-30 NOTE — PROVIDER NOTIFICATION
01/30/18 0900   Call Information   Date of Call 01/30/18   Time of Call 0903   Name of person requesting the team Shayna Davis   Title of person requesting team RN   RRT Arrival time 0906   Time RRT ended 0910   Reason for call   Type of RRT Adult   Primary reason for call Sepsis suspected   Sepsis Suspected Elevated Lactate level   Was patient transferred from the ED, ICU, or PACU within last 24 hours prior to RRT call? No   SBAR   Situation Lactic acid 7.9   Background Pt has lymphoma, BING and tumor lysis. Received Rituxan yesterday.   Notable History/Conditions Cancer   Assessment Pt is alert and oriented. VSS.    Interventions IV fluids   Adjustments to Recommend Team to write a .sepsis note   Patient Outcome   Patient Outcome Stabilized on unit   RRT Team   Attending/Primary/Covering Physician Heme/Onc Team 2   Date Attending Physician notified 01/30/18   Time Attending Physician notified [id number]   Physician(s) Amparo Ontiveros NP   Lead RN Kiley Deng   Post RRT Intervention Assessment   Post RRT Assessment Stable/Improved   Date Follow Up Done 01/30/18   Time Follow Up Done 0158

## 2018-01-31 NOTE — PLAN OF CARE
Problem: Patient Care Overview  Goal: Plan of Care/Patient Progress Review  Outcome: No Change  Patient had uneventful night. Not saving urine due to history of  Diarrhea. To get cytarabine this am. Denies abdominal pain and hopes to advance diet.Denies nausea also

## 2018-01-31 NOTE — PROGRESS NOTES
SPIRITUAL HEALTH SERVICES  SPIRITUAL ASSESSMENT Progress Note  [facility] ([place]) 7D     REFERRAL SOURCE: Length of stay    Mr. Beckwith is hospital day 8, which prompted my visit.  Mr. Beckwith was present with his spouse. They decline spiritual care visits today.    PLAN: No follow up needed at this time.    Tamia Gould  Oncology   Pager [phone]

## 2018-01-31 NOTE — PROGRESS NOTES
Chemo note:  D/I: pt received zofran as premed. Brisk blood return from PICC . Dose 1 Cytarabine infused over 2 hr.   A/P: Pt tolerated well. Good urine output. Denies nausea. Continue to monitor for side effects.

## 2018-01-31 NOTE — PLAN OF CARE
Problem: Chemotherapy Effects (Adult)  Goal: Signs and Symptoms of Listed Potential Problems Will be Absent, Minimized or Managed (Chemotherapy Effects)  Signs and symptoms of listed potential problems will be absent, minimized or managed by discharge/transition of care (reference Chemotherapy Effects (Adult) CPG).   Outcome: No Change  Afebrile, VSS. Pt received Dose #1 Oxaliplatin after chemotherapy education and premeds. Tolerated infusion well.  Denied nausea. Reports abd pain decreased compared to yesterday. 2 loose stools with no blood observed. Tolerated small amount of full liquid items for dinner and eager to trial more advanced diet. No acute evening events. Plan for Cytarabine tomorrow.

## 2018-01-31 NOTE — PROGRESS NOTES
"[facility], [place]    Hematology/Oncology Progress Note    Date of Service (when I saw the patient): 01/31/2018     Assessment & Plan   Tristen Beckwith is a 73 year old man with history of DLBCL (1994) and follicular lymphoma (2013) who now presents with recurrent DLBLC. He is found to have acute on chronic kidney injury with hyperuricemia. He was admitted on 1/24/18 for further evaluation and management prior to anticipated chemotherapy.     Plan today:  -HOLD lovenox. Continue to monitor stools for blood, no bloody stools today.  -continue TLS labs bid  -tolerating chemo well thus far  - Cytarabine and dex today  - Soft diet; continue TPN due to minimal PO intake     HEME:   #Recurrent DLBCL  Presented with symptoms of epigastric/abdominal pain, bloating, and early satiety. CT A/P w/ IV contrast (1/16/18) demonstrated new marked enlargement of head and proximal body of pancreas with surrounding edema and diffuse wall thickening throughout the colon; findings concerning for lymphomatous involvement. He underwent colonoscopy with bx on 1/19; this demonstrated diffuse large B-cell lymphoma, germinal center subtype. FISH for BCL2, BCL6 and MYC pending. Met with Dr. Ernandez prior to admission. Initially working to optimize kidney function prior to considering chemotherapy as well as to complete staging with PET/CT imaging. Will need to avoid cytoxan (h/o interstitial pneumonitis) and adriamycin (cardiomyopathy).   - repeat ECHO (1/24) demonstrated EF 46%  - PICC line placed  - s/p PET/CT scan (1/25). This demonstrated diffuse hypermetabolic lymphoma infiltration throughout peritoneal cavity. Given extensiveness of disease and risk for bowel perforation, as well as high risk of TLS and further kidney injury, plan is to start \"pre-phase\" treatment slowly.   - initiated steroids with methylprednisolone 100mg IV on 1/25 and completed 1/29  - received rituxan 1/29, infused very slowly d/t mild " reaction (HTN, clammy, nausea)  - Tolerated oxaliplatin on 1/30, receiving cytarabine today (1/31)    #Lactic acidosis without metabolic acidosis or sepsis.  2/2 lymphoma and cytotoxic therapy. Lactate actually trending down since first checked on 1/27 (11). LA 5.7 today.   - Continue IVF    #Hyperuricemia, concern for TLS  Uric acid 14.7 on admission, now wnl s/p rasburicase on 1/24  - continue allopurinol  - continue to follow TLS labs BID at this time  - TPN started  - Regular diet    #Mild coagulopathy  Likely 2/2 nutritional deficiency.   - discussed adding Vit K to TPN (starting 1/26 PM)  - monitor coags q M/Th (pending trend can be monitoring more frequently if needed)     #Hematochezia  Last episode on 1/29 PM. No blood in stools since then. Hgb stable. CRS eval'ed and no intervention at this time. Continue to monitor stools and hgb.      CV:  #CAD s/p stenting  #H/o HTN, HLD  #Ischemic cardiomyopathy  History of large anterior wall MI in 2000 s/p stenting of LAD. Resultant chronic systolic heart failure 2/2 ischemia. ECHO in 11/2017 demonstrated EF 48%. Most recent BNP was 569 (1/2/18 in care everywhere). Repeat ECHO prior to anticipated treatment demonstrates EF 46% (1/24/18).  - appreciate Cardiology consult to optimize cardiac status in setting of recurrent disease  - continue ASA at this time until Plt count <50K   - continue PTA metoprolol  - holding lipitor at this time (mild AST elevation and deciding plan for chemotherapy; add back when able)  - holding lisinopril given current renal dysfunction  - hold torsemide given renal dysfunction; Now weight up 5 lbs and renal function normalized. Will restart torsemide 20 mg daily  - judicious use of IV fluids (do not run at rate more than 125cc/hr per patient)     RENAL:   #Acute on chronic kidney injury  Appears baseline (1531-7019) ranged 1.1-1.2 with GRF >60. More recently since 01/2017 has been 1.3-1.6 (GRF 50s to >60). Of note, did get IV contrast with  CT on 1/16/18. Creatinine 2.31 on admission, likely exacerbated by hyperuricemia/TLS and dehydration (given poor PO intake). Cr now normalized, 1.0 today  - s/p rasburicase (1/24)  - UA with 1 hyaline cast  - lymphoma does not appear to be involving/affecting the kidneys based on PET/CT  - continue to trend with BMP q8hrs at this time, especially with continuing slow pre-phase treatment  - hold/limit nephrotoxic meds as able  - appreciate Nephrology consult to optimize management pending trend of Cr with pre-phase treatment/chemotherapy  - strict I/Os  - TPN started 1/26    #Metabolic acidosis  2/2 GI losses with watery diarrhea. Bicarb now stable.    GI:  #Abdominal bloating, distention, early satiety  2/2 lymphomatous involvement.   - simethicone and tramadol PRN discomfort or pain    #Watery, mucous-like stools.   Pt is s/p colonscopy on 1/19. Anticipate ongoing symptoms are due to lymphomatous involvement of the colon. Would like to avoid any bowel meds (anti-motility and stimulating laxatives) at this time as pt is also at risk for bowel perforation, especially as we begin treatment, due to lymphoma involvement.   - although less likely, did r/o infection with C.diff (negative) and enteric panel (negative)  - imodium prn    #Mild transaminitis.   AST mildly elevated. On PET/CT there does not appear to be any substantial involvement in the liver.   - trend hepatic panel q48hrs    ENDO:  #Hypoglycemia - RESOLVED  Overnight 1/24-1/25 BGs were 53-60s. Likely 2/2 poor PO intake throughout 1/24 followed by NPO status overnight (1/24-1/25) in anticipation of PET scan. S/p D50 x once, glucose gel.   - monitor BG with steroids now on board (1/25-1/29)    FEN  - on full liquids, tolerating minimal PO intake, continue as tolerated  - monitor lytes, no current repletion protocol in place given BING. Monitor closely with start of TPN, concern for possible refeeding. Add standing lyte repletion pending trend of  Cr.     PPx/Misc:  - continue ASA as above  - added Lovenox ppx -- on HOLD for now with GIB  - add GI ppx with steroids now on board  - patient declined PT consult     Audra Carrion PA-C  Hematology/Oncology  Pager: [phone]    Interval History   No further bloody stools. Hgb stable.  No other e/o bleeding. Feeling better this AM. Feels that abdomen is softer, less tender. No fevers, sweats, chills, SOB. Tolerating small amounts of full liquids.     Physical Exam   Temp: 95.6  F (35.3  C) Temp src: Axillary BP: (!) 165/97 Pulse: 81 Heart Rate: 68 Resp: 20 SpO2: 97 % O2 Device: None (Room air)    Vitals:    01/29/18 0735 01/30/18 0945 01/31/18 0700   Weight: 61.3 kg (135 lb 3.2 oz) 61.6 kg (135 lb 12.9 oz) 63.7 kg (140 lb 8 oz)     Vital Signs with Ranges  Temp:  [95.2  F (35.1  C)-95.9  F (35.5  C)] 95.6  F (35.3  C)  Pulse:  [81-90] 81  Heart Rate:  [68-86] 68  Resp:  [16-20] 20  BP: (140-174)/(80-97) 165/97  SpO2:  [90 %-97 %] 97 %  I/O last 3 completed shifts:  In: 4215.09 [P.O.:500; I.V.:829.17; IV Piggyback:1584]  Out: -   Constitutional: Pleasant man, thin, seen resting in bed in NAD. Alert, interactive.   HEENT: NC/AT. MMM.  Respiratory: Breathing even and non-labored on RA. Able to sit up for exam. Lungs CTA b/l, no wheezing or crackles.  Cardiovascular: RRR, no murmur. No peripheral edema.   GI: Normoactive bowel sounds. Hyperactive in RUQ. Abd softer today, mildly distended, nontender.   Skin: Clean, dry, intact.   Musculoskeletal: Extremities thin, o/w grossly normal in appearance.   Neurologic: Alert and oriented. Speech normal. No focal deficits.   Neuropsychiatric: Calm, mentation appears normal, affect congruent.        Medications     parenteral nutrition - ADULT compounded formula       NaCl 75 mL/hr at 01/31/18 1303     parenteral nutrition - ADULT compounded formula 50 mL/hr at 01/30/18 2005     - MEDICATION INSTRUCTIONS -       NaCl       IV fluid REPLACEMENT ONLY       - MEDICATION INSTRUCTIONS -          [START ON 2/1/2018] aspirin EC  162 mg Oral Daily     torsemide  20 mg Oral Daily     ondansetron  8 mg Oral Q12H     dexamethasone  40 mg Oral Daily     cytarabine (CYTOSAR) chemo infusion  1 g/m2 (Treatment Plan Recorded) Intravenous Q12H     [START ON 2/1/2018] filgrastim (NEUPOGEN/GRANIX) subcutaneous  300 mcg Subcutaneous Daily at 8 pm     prednisoLONE acetate  1 drop Both Eyes 4x Daily     artificial saliva  5-10 mL Swish & Spit 4x Daily     famotidine  20 mg Intravenous Q24H     lipids  250 mL Intravenous Once per day on Mon Tue Wed Thu Fri     allopurinol  300 mg Oral Daily     metoprolol succinate (TOPROL-XL) 24 hr tablet 100 mg  100 mg Oral Daily     sodium chloride (PF)  10 mL Intracatheter Q7 Days       Data   Results for orders placed or performed during the hospital encounter of 01/24/18 (from the past 24 hour(s))   Hemoglobin   Result Value Ref Range    Hemoglobin 11.7 (L) 13.3 - 17.7 g/dL   Basic metabolic panel   Result Value Ref Range    Sodium 141 133 - 144 mmol/L    Potassium 4.3 3.4 - 5.3 mmol/L    Chloride 101 94 - 109 mmol/L    Carbon Dioxide 29 20 - 32 mmol/L    Anion Gap 10 3 - 14 mmol/L    Glucose 128 (H) 70 - 99 mg/dL    Urea Nitrogen 66 (H) 7 - 30 mg/dL    Creatinine 1.05 0.66 - 1.25 mg/dL    GFR Estimate 69 >60 mL/min/1.7m2    GFR Estimate If Black 84 >60 mL/min/1.7m2    Calcium 7.7 (L) 8.5 - 10.1 mg/dL   Lactate Dehydrogenase   Result Value Ref Range    Lactate Dehydrogenase 5858 (H) 85 - 227 U/L   Phosphorus   Result Value Ref Range    Phosphorus 5.3 (H) 2.5 - 4.5 mg/dL   Uric acid   Result Value Ref Range    Uric Acid 2.1 (L) 3.5 - 7.2 mg/dL   Hemoglobin   Result Value Ref Range    Hemoglobin 11.3 (L) 13.3 - 17.7 g/dL   CBC with platelets differential   Result Value Ref Range    WBC 6.4 4.0 - 11.0 10e9/L    RBC Count 3.49 (L) 4.4 - 5.9 10e12/L    Hemoglobin 10.6 (L) 13.3 - 17.7 g/dL    Hematocrit 32.6 (L) 40.0 - 53.0 %    MCV 93 78 - 100 fl    MCH 30.4 26.5 - 33.0 pg    MCHC  32.5 31.5 - 36.5 g/dL    RDW 14.2 10.0 - 15.0 %    Platelet Count 102 (L) 150 - 450 10e9/L    Diff Method Automated Method     % Neutrophils 92.3 %    % Lymphocytes 4.8 %    % Monocytes 1.7 %    % Eosinophils 0.0 %    % Basophils 0.0 %    % Immature Granulocytes 1.2 %    Nucleated RBCs 0 0 /100    Absolute Neutrophil 5.9 1.6 - 8.3 10e9/L    Absolute Lymphocytes 0.3 (L) 0.8 - 5.3 10e9/L    Absolute Monocytes 0.1 0.0 - 1.3 10e9/L    Absolute Eosinophils 0.0 0.0 - 0.7 10e9/L    Absolute Basophils 0.0 0.0 - 0.2 10e9/L    Abs Immature Granulocytes 0.1 0 - 0.4 10e9/L    Absolute Nucleated RBC 0.0    Magnesium   Result Value Ref Range    Magnesium 2.1 1.6 - 2.3 mg/dL   Hepatic panel   Result Value Ref Range    Bilirubin Direct 0.1 0.0 - 0.2 mg/dL    Bilirubin Total 0.4 0.2 - 1.3 mg/dL    Albumin 2.3 (L) 3.4 - 5.0 g/dL    Protein Total 4.8 (L) 6.8 - 8.8 g/dL    Alkaline Phosphatase 46 40 - 150 U/L    ALT 21 0 - 70 U/L     (H) 0 - 45 U/L   Basic metabolic panel   Result Value Ref Range    Sodium 141 133 - 144 mmol/L    Potassium 4.5 3.4 - 5.3 mmol/L    Chloride 102 94 - 109 mmol/L    Carbon Dioxide 28 20 - 32 mmol/L    Anion Gap 11 3 - 14 mmol/L    Glucose 200 (H) 70 - 99 mg/dL    Urea Nitrogen 55 (H) 7 - 30 mg/dL    Creatinine 1.00 0.66 - 1.25 mg/dL    GFR Estimate 74 >60 mL/min/1.7m2    GFR Estimate If Black 89 >60 mL/min/1.7m2    Calcium 7.6 (L) 8.5 - 10.1 mg/dL   Lactate Dehydrogenase   Result Value Ref Range    Lactate Dehydrogenase 5106 (H) 85 - 227 U/L   Phosphorus   Result Value Ref Range    Phosphorus 4.8 (H) 2.5 - 4.5 mg/dL   Uric acid   Result Value Ref Range    Uric Acid 2.9 (L) 3.5 - 7.2 mg/dL

## 2018-01-31 NOTE — PLAN OF CARE
Problem: Patient Care Overview  Goal: Plan of Care/Patient Progress Review  Outcome: No Change  Pt A & O, up independently in room, chair, and walked in hallway. All VSS except hypertensive. /89.  Pt states Biotene dry mouthwash is not working. Denies nausea. Prep for Chemo with Zofran. Cytarabine tolerated this morning without issues. IV blood return observed. No bowel movement today. Passing flatus couple of times. PICC dressing changed with some ecchymosis noted. Full liquid diet. Pt wants to advance diet, but complains of abdominal bloating. Family support at bedside.

## 2018-02-01 NOTE — PROGRESS NOTES
02/01/18 1532   Call Information   Date of Call 02/01/18   Time of Call 1532   Name of person requesting the team Marcsu   Title of person requesting team RN   RRT Arrival time 1534   Time RRT ended 1655   Reason for call   Type of RRT Adult   Primary reason for call Staff concerned;Sudden or unanticipated change in patient condition   Was patient transferred from the ED, ICU, or PACU within last 24 hours prior to RRT call? No   SBAR   Situation sats down to 87 on 15 L, resp rate= 42   Background lymphoma, BING, tumor lysis   Notable History/Conditions Cancer   Assessment Anixious, tachypneic to 42, unstable BP   Interventions Labs;Meds;O2 per N/C or mask;Other (describe)   Adjustments to Recommend (Transfer to ICU)   Patient Outcome   Patient Outcome Transferred to  (unit 4C, intubated per code blue before transfer from 7D to )   RRT Team   Attending/Primary/Covering Physician Dr Waters   Date Attending Physician notified 02/01/18   Time Attending Physician notified [id number]   Physician(s) Dr Pavon   Lead RN Indio Velazquez   RT sergey

## 2018-02-01 NOTE — PROGRESS NOTES
Critical Care Services Progress Note:  I personally examined and evaluated the patient today. I formulated today s plan with the house staff team or resident(s) and agree with the findings and plan in the associated note (see separately attested resident note).   I have evaluated all laboratory values and imaging studies from the past 24 hours.  Summary of hospital course:  73M h/o DLBCL in 1990s s/p RCHOP, now with follicular lymphoma, has anterior wall MI s/p stenting LAD.  Now admitted for DLBCL to colon.  Started on rituximab and pred as initial treatment.  Got large amount of fluid for TLS, but home torsemide was held.  Overnight events/pertinent findings today:  Became increasingly dyspnic this afternoon.  I was called by heme/onc NP and notified that he required tranfser to the MICU.  I recommended bipap, and ntg.  He had already gotten lasix.  On my arrival he was on bipap and satting in the low 80s despite high settings.  I activated code blue and he was successfully intubated by anesthesia with recovery of his sats to 90s.  Data  Satting 90s on 100% fio2 and 15 peep.  Hypertensive  Net in 16L this hospitalization.  Labs (personally reviewed): creat 1.1 (baseline 1.0), calcium 7.2, phos 5.6, LDH 3800, UA 3.3 (post rasburicase), vbg compensated resp acidosis 7.36/56/33/32.  Cbc acceptable.  CXR (personally reviewed):  B/l diffuse infiltrates.  EKG (personally reviewed):  111, sinus, nl axis, nl interval, no ischemia.  Assessment/plan:  1.  Hypoxemic respiratory failure, vent dependent, acute:  Most likely flash edema.  Intubated, high peep, propofol for sedation, diuresis.  Also possibly ARDS, though less likely.  Broad spectrum abx, pan culture.  2.  Hypertension:  In setting of pulmonary edema.  Propofol and nitro drip to correct.  3.  TLS:  Already received rasburicase.  Monitor phos and K.  Do not replete calcium unless symptomatic.  Restart IVF tomorrow.  Rest per resident note.   I spent a total of 45  minutes (excluding procedure time) personally providing and directing critical care services at the bedside and on the critical care unit for this patient.     David Barfield

## 2018-02-01 NOTE — PROGRESS NOTES
Nursing Focus: Chemotherapy  D: Positive blood return via PICC. Insertion site is clean/dry/intact, dressing intact with no complaints of pain.  Urine output is recorded in intake in Doc Flowsheet.    I: Premedications given per order (see electronic medical administration record). Dose #2 of Cytarabine started to infuse over 2 hours (per pt request). Reviewed pt teaching on chemotherapy side effects.  Pt denies need for further teaching. Chemotherapy double checked per protocol by two chemotherapy competent RN's.   A: Tolerating procedure well. Denies nausea and or pain.   P: Continue to monitor urine output and symptoms of nausea. Screen for symptoms of toxicity.

## 2018-02-01 NOTE — PLAN OF CARE
Problem: Patient Care Overview  Goal: Plan of Care/Patient Progress Review  Outcome: No Change  VSS. Afebrile. Denies pain or nausea. Ate about 50% of dinner meal. Continues on TPN and Lipids. Calorie counts starting at MN. Pt aware but will need reminders. Urinal placed in bathroom for pt to save urine. Sheet of paper given for pt to record intake. On strict I&Os. Needs encouragement to drink. Crackles heard in bases. Phos elevated to 5.6; moonlighter notified but no interventions ordered. Dose #2 of Cytarabine currently infusing via PICC without incident. Wife at bedside and very supportive with cares. Up with SBA. MIVF 75ml/hr. Caps changed on both lumens of PICC. No acute events. Cont POC.

## 2018-02-01 NOTE — PROGRESS NOTES
SPIRITUAL HEALTH SERVICES  SPIRITUAL ASSESSMENT Progress Note  [facility] 7D     REFERRAL SOURCE: RN    I was asked to provide support to wife while the medical team was working to stabilize patient's breathing. Wife declined  support as son was present and offering support.     PLAN: I will update unit  of this visit.    Tamia Ng[place]  Oncology   Pager [phone]

## 2018-02-01 NOTE — H&P
"    MICU History & Physical   Tristen Beckwith ([id number]) admitted on 1/24/2018  Primary care provider: Daisy Ernandez    Chief Complaint: No chief complaint on file.    History of Present Illness:   Tristen Beckwith is a 73 year old male with a PMHx significant for diffuse large B-cell lymphoma s/p R-CHOP in the mid 90s c/b Cytoxan-induced pneumonitis in remission but w/ relapses of follicular lymphoma, treated most recently in 2013, anterior wall MI in 2000 s/p stenting of the LAD, and CKD III who presented to his oncology clinic apt on 1/21 w/ 1wk of abd distension, fatigue, and decreased appetite, found to have BING (Cr 2.31, baseline 1.3 - 1.6), and hyperuricemia (~12) c/f spontaneous tumor lysis. Of note, CT w/ contrast on 1/16/2018 showed pancreatic fullness and colonic thickening. Biopsy from colonoscopy resulted in Diffuse Large B Cell germinal center subtype. He was admitted for TSL, BING, and induction of chemo on 1/21. He was overall improving and was started on chemotherapy when on 2/1 at about 1:30pm, the patient was noted to desat to 89-90% requiring 2L NC O2. He received 1 dose of Lasix 40mg w/ 500cc UOP. He received another 40mg IV Lasix. His O2 needs continued to increase and was satting in the 80s on 100% vapo therm. CXR showed b/l diffuse infiltrates. He patient was intubated prior to arrival to the ICU.     Overview of events this admission: The patient was given rasburicase, rituxumab and prednisone. His torsemide and lisinopril were held in the setting of BING and volume depletion. He was given fluids throughout the admission and is up 15L since admission (weight up 5kg).   PET- CT on 1/25: \"soft tissue thickening throughout the peritoneal cavity including masslike thickening in the central mesentery, with extension into the retroperitoneum and right spermatic cord; soft tissue thickening of the epicardial fat-pad extending to involve the costal cartilage, Hypermetabolic mediastinal lymph nodes, " "soft tissue thickening involving the right costal cartilage at multiple levels and the right lateral chest wall in the rib spaces and pleura, small hypermetabolic low cervical lymph nodes, no evidence of bone marrow involvement.\"     He was started on a chemotherapy regimen consisting of methylprednisolone 100mg IV 1/25-1/29, rituxan 1/29, oxaliplatin on 1/30, cytarabine 1/31, rituxan 1/29. His Lovenox DVT ppx was held on 1/31 given that the patient was deemed at high risk for bowel perforation. Toresmide was restarted on 2/1.     Past Medical History:   Past Medical History:   Diagnosis Date     Coronary artery disease      H/O percutaneous transluminal coronary angioplasty      History of acute anterior wall myocardial infarction        Past Surgical History:   Past Surgical History:   Procedure Laterality Date     COLONOSCOPY N/A 1/19/2018    Procedure: COLONOSCOPY;  colonscopy;  Surgeon: Cristofer French MD;  Location:  GI     Medications (prior to admission):  Prescriptions Prior to Admission   Medication Sig Dispense Refill Last Dose     atorvastatin (LIPITOR) 40 MG tablet TAKE 1 TABLET BY MOUTH DAILY        torsemide (DEMADEX) 20 MG tablet Take 20 mg by mouth daily   1/18/2018     aspirin 325 MG tablet Take 1/2 tablet daily   Taking     Nutritional Supplements (SOY PROTEIN SHAKE) POWD Take 1 dose. by mouth as needed Every other day   Taking     METOPROLOL SUCCINATE PO Take 100 mg by mouth daily.     Taking     lisinopril (PRINIVIL,ZESTRIL) 40 MG tablet Take 1 tablet by mouth daily. 90 tablet 3 Taking     Allergy:  Allergies   Allergen Reactions     Cyclophosphamide Other (See Comments)     interstitial pneumonitis     Sulfa Drugs Rash     Social History:   Social History     Social History     Marital status:      Spouse name: N/A     Number of children: N/A     Years of education: N/A     Social History Main Topics     Smoking status: Former Smoker     Smokeless tobacco: Never Used     Alcohol use " Not on file     Drug use: Not on file     Sexual activity: Not on file     Other Topics Concern     Not on file     Social History Narrative     Family History:  No family history on file.    ROS:   ROS: 12 point ROS neg other than the symptoms noted above in the HPI.    Physical exam:  Temp:  [95.4  F (35.2  C)-96.3  F (35.7  C)] 95.5  F (35.3  C)  Pulse:  [76] 76  Heart Rate:  [] 83  Resp:  [16-42] 26  BP: (148-188)/() 160/95  FiO2 (%):  [100 %] 100 %  SpO2:  [83 %-97 %] 95 %  Wt Readings from Last 3 Encounters:   02/01/18 64.7 kg (142 lb 9.6 oz)   01/24/18 58.2 kg (128 lb 4.8 oz)   05/02/17 64.9 kg (143 lb 1.6 oz)       General: intubated, sedated   HEENT: EOMI, PERRLA, no scleral icterus or injection  CARDIAC: tachycardic, regular rhythm, no m/r/g  RESP: normal breath sounds, no wheezes or crackles appreciated.  GI: NT/ND, bowel sounds active  : castelan in place  EXTREMITIES: NO LE edema or edema on the back   SKIN: No acute lesions appreciated  NEURO: sedated, no focal neurological deficits noted    Labs:  CBC  Recent Labs  Lab 02/01/18  0531 01/31/18  0546 01/30/18  2115 01/30/18  1425 01/30/18  0543 01/29/18  1807   WBC 8.0 6.4  --   --  7.6 10.8   RBC 3.45* 3.49*  --   --  3.99* 4.17*   HGB 10.6* 10.6* 11.3* 11.7* 12.3* 12.9*   HCT 32.5* 32.6*  --   --  37.7* 39.6*   MCV 94 93  --   --  95 95   MCH 30.7 30.4  --   --  30.8 30.9   MCHC 32.6 32.5  --   --  32.6 32.6   RDW 14.3 14.2  --   --  14.5 14.6   * 102*  --   --  110* 137*     BMP  Recent Labs  Lab 02/01/18  0531 01/31/18  1726 01/31/18  0546 01/30/18  1712 01/30/18  0543  01/29/18  0532    138 141 141 140  < > 140   POTASSIUM 4.2 4.5 4.5 4.3 4.4  < > 4.1   CHLORIDE 94 99 102 101 99  < > 99   CO2 29 30 28 29 25  < > 20   ANIONGAP 11 10 11 10 16*  < > 22*   * 190* 200* 128* 174*  < > 108*   BUN 61* 57* 55* 66* 69*  < > 59*   CR 1.11 1.08 1.00 1.05 1.15  < > 1.15   GFRESTIMATED 65 67 74 69 63  < > 63   GFRESTBLACK 79 81 89  84 76  < > 76   MILY 7.2* 7.6* 7.6* 7.7* 7.9*  < > 8.4*   MAG 1.8  --  2.1  --  2.7*  --  2.4*   PHOS 5.6* 5.6* 4.8* 5.3* 5.8*  < > 3.6   < > = values in this interval not displayed.     INR  Recent Labs  Lab 02/01/18  0531 01/30/18  0825 01/29/18  0532 01/27/18  0544   INR 1.06 1.11 1.18* 1.21*     Liver panel  Recent Labs  Lab 01/31/18  0546 01/29/18  0532 01/28/18 2129 01/27/18  0544   PROTTOTAL 4.8* 5.2* 5.4* 5.6*   ALBUMIN 2.3* 2.7* 2.8* 2.7*   BILITOTAL 0.4 0.5 0.5 0.4   ALKPHOS 46 57 61 60   * 79* 86* 87*   ALT 21 24 26 24       MELD-Na score: 8 at 2/1/2018  5:31 AM  MELD score: 8 at 2/1/2018  5:31 AM  Calculated from:  Serum Creatinine: 1.11 mg/dL at 2/1/2018  5:31 AM  Serum Sodium: 135 mmol/L at 2/1/2018  5:31 AM  Total Bilirubin: 0.4 mg/dL (Rounded to 1) at 1/31/2018  5:46 AM  INR(ratio): 1.06 at 2/1/2018  5:31 AM  Age: 73 years    Imaging/Procedure results:  Recent Results (from the past 24 hour(s))   XR Chest Port 1 View    Narrative    Exam:  Chest X-ray 2/1/2018 2:39 PM    History: new onset SOB and cough, suspect pulm edema, hx HF and  weight/fluid up today;     Comparison: 1/24/2018    Findings: AP semiupright chest radiograph is obtained. Right sided  PICC with tip projecting over the mid SVC. Cardiac mediastinal  silhouette is unchanged. Aortic calcifications. Pulmonary vasculature  is mildly indistinct. Increased hazy opacity in the right lower lung  with silhouetting of the right hemidiaphragm, consistent with pleural  effusion. Right greater than left basilar opacities, increased  compared to prior. More focal perihilar interstitial opacity on the  left although edema could have this appearance its unilateral  appearance suggests an infection. No large left pleural effusion. No  pneumothorax. The upper abdomen is unremarkable.      Impression    Impression:   1.  Increased right-sided pleural effusion with associated right lower  lobe atelectasis/consolidation.  2.  Left lower lobe  opacities, likely represent atelectasis.   3. Interstitial opacity primarily in the left midlung likely an  atypical infection. Edema stays in the differential.    I have personally reviewed the examination and initial interpretation  and I agree with the findings.    JESS PALMER MD           ASSESSMENT & PLAN :  Tristen Beckwith is a 73 year old male with a PMHx significant for diffuse large B-cell lymphoma s/p R-CHOP in the mid 90s c/b Cytoxan-induced pneumonitis in remission but w/ relapses of follicular lymphoma, treated most recently in 2013, anterior wall MI in 2000 s/p stenting of the LAD, and CKD III who was admitted on 1/21 for treatment of TLS, BING, and induction of chemotherapy for diffuse large B cell lymphoma of the colon which was diagnosed late last month. He was improving in terms of his TLS but suffered hypercarbic, hypoxemic respiratory failure on 2/1 requiring intubation, thought to be 2/2 flash pulmonary edema versus ARDS.     NEURO  # Sedation  DC propofol     CARDIOVASCULAR  #Shock, requiring norepi and vaso, no signs of active bleeding   - Cardiology fellow paged for echo   - On broad spectrum abx, cxs pending  - Hold metoprolol, lisinopril, home torsemide     #CAD s/p large anterior wall MI in 2000 s/p stenting of the LAD  #Troponemia   Trop 0.116 w/ EKG not convincing for ischemia   Nuclear MPI in 2015 showed large fixed perfusion defect in the mid anterior and apical wall, no inducible ischemia  - Trend troponins Q6H   -- Continue ASA 81 mg daily until platelets drop below 50,000  -- When liver function improves resume statin therapy    #Systolic heart failure secondary to ischemic cardiomyopathy  TTE on 1/24:  LVEF of 45-50% and apical wall akinesis  - Meds as above    PULM  #Acute hypercarbic, hypoxemic resp failure s/p intubation  Flash pulmonary edema vs ARDS; less likely PE, less likely infection; s/p 120mg Lasix over the course of the afternoon; UOP dropping off, will give albumin and  "500cc NS and diurese w/ Lasix 40mg Q6H    - CT-PE cancelled given drop in urine output and hypotension, will decide if it's worth the contrast injury tomorrow      GI  #Diffuse-large B cell lymphoma of colon  High risk of perforation, not signs of active bleeding currently; avoiding any bowel meds (anti-motility and stimulating laxatives) per heme/onc   - imodium prn  - IV PPI BID for now w/ acute decompensation and reports of bloody secretions    # Nutrition: consider NG tube feeds tomorrow     RENAL  # BING on CKD III on admission, now resolved but will likely recur in the setting of shock   Baseline creatinine 1.3 - 1.6.  Likely was prerenal in setting of poor PO intake while continuing diuretics vs contrast-induced nephropathy   - Nephrology consulted  - Strict I's and O's     HEME/ONC  #Diffuse large B-cell lymphoma, now w/ recurrence in the  Diagnosed in mid 1990s s/p R-CHOP c/b Cytoxan-induced pneumonitis   Patient also had relapses of follicular lymphoma, treated most recently in 2013 w/ bendamustine and rituximab  CT abd pelvis on 1/16 w/ contrast demonstrated new marked enlargement of head and proximal body of pancreas with surrounding edema and diffuse wall thickening throughout the colon  PET- CT on 1/25: \"soft tissue thickening throughout the peritoneal cavity including masslike thickening in the central mesentery, with extension into the retroperitoneum and right spermatic cord, soft tissue thickening of the epicardial fat-pad extending to involve the costal cartilage, Hypermetabolic mediastinal lymph nodes, soft tissue thickening involving the right costal cartilage at multiple levels and the right lateral chest wall in therib spaces and pleura, small hypermetabolic low cervical lymph nodes, no evidence of bone marrow involvement.\"  Late Jan- Biopsy from colonoscopy resulted in Diffuse Large B Cell germinal center subtype.    #TLS   Had been improving on the heme/onc floor  - Cont to monitor TLS labs " BID     ENDOCRINE  #Steroid-induced hyperglycemia   - Insulin gtt  - Hypoglycemia protocol     INFECTIOUS DISEASE  # Antimicrobials  Vanc 2/1-   Zosyn 2/1-     SKIN/MSK  NA    Prophylaxis:  DVT: IV PPI BID   GI: IV PPI  Family: updated at bedside  Disposition: ICU  Code Status: full     Ashley Gerard M.D.   PGY1 Internal Medicine   [phone]    Patient was seen and discussed with attending physician Dr. Barfield, who agrees with above assessment and plan.

## 2018-02-01 NOTE — PROGRESS NOTES
"[facility], [place]    Hematology/Oncology Progress Note    Date of Service (when I saw the patient): 02/01/2018     Assessment & Plan   Tristen Beckwith is a 73 year old man with history of DLBCL (1994) and follicular lymphoma (2013) who now presents with recurrent DLBLC. He is found to have acute on chronic kidney injury with hyperuricemia. He was admitted on 1/24/18 for further evaluation and management prior to anticipated chemotherapy.     Plan today:  -continue TLS labs bid  - Dex today  - Regular diet  - PO intake improving, calorie counts, d/c lipids in TPN today and plan to d/c TPN tomorrow if adequate PO intake     HEME:   #Recurrent DLBCL  Presented with symptoms of epigastric/abdominal pain, bloating, and early satiety. CT A/P w/ IV contrast (1/16/18) demonstrated new marked enlargement of head and proximal body of pancreas with surrounding edema and diffuse wall thickening throughout the colon; findings concerning for lymphomatous involvement. He underwent colonoscopy with bx on 1/19; this demonstrated diffuse large B-cell lymphoma, germinal center subtype. FISH for BCL2, BCL6 and MYC pending. Met with Dr. Ernandez prior to admission. Initially working to optimize kidney function prior to considering chemotherapy as well as to complete staging with PET/CT imaging. Will need to avoid cytoxan (h/o interstitial pneumonitis) and adriamycin (cardiomyopathy).   - repeat ECHO (1/24) demonstrated EF 46%  - PICC line placed  - s/p PET/CT scan (1/25). This demonstrated diffuse hypermetabolic lymphoma infiltration throughout peritoneal cavity. Given extensiveness of disease and risk for bowel perforation, as well as high risk of TLS and further kidney injury, plan is to start \"pre-phase\" treatment slowly.   - initiated steroids with methylprednisolone 100mg IV on 1/25 and completed 1/29  - received rituxan 1/29, infused very slowly d/t mild reaction (HTN, clammy, nausea)  - Tolerated " oxaliplatin on 1/30 and cytarabine on 1/31. Last day of dexamethasone tomorrow (2/2)    #Lactic acidosis without metabolic acidosis or sepsis.  2/2 lymphoma and cytotoxic therapy. Lactate actually trending down since first checked on 1/27 (11).   - d/c IVF today    #Hyperuricemia, concern for TLS  Uric acid 14.7 on admission, now wnl s/p rasburicase on 1/24  - continue allopurinol  - continue to follow TLS labs BID at this time  - TPN started  - Regular diet    #Mild coagulopathy  Likely 2/2 nutritional deficiency.   - discussed adding Vit K to TPN (starting 1/26 PM)  - monitor coags q M/Th (pending trend can be monitoring more frequently if needed)     #Hematochezia  Last episode on 1/29 PM. No blood in stools since then. Hgb stable. CRS eval'ed and no intervention at this time. Continue to monitor stools and hgb.      CV:  #CAD s/p stenting  #H/o HTN, HLD  #Ischemic cardiomyopathy  History of large anterior wall MI in 2000 s/p stenting of LAD. Resultant chronic systolic heart failure 2/2 ischemia. ECHO in 11/2017 demonstrated EF 48%. Most recent BNP was 569 (1/2/18 in care everywhere). Repeat ECHO prior to anticipated treatment demonstrates EF 46% (1/24/18).  - appreciate Cardiology consult to optimize cardiac status in setting of recurrent disease  - continue ASA at this time until Plt count <50K   - continue PTA metoprolol  - holding lipitor at this time (mild AST elevation and deciding plan for chemotherapy; add back when able)  - holding lisinopril given current renal dysfunction  - hold torsemide given renal dysfunction; Now weight up 5 lbs and renal function normalized. Restarted torsemide 20 mg daily on 1/31.   - D/C IVF today  - Restart low dose lisinopril today (10 mg daily)      RENAL:   #Acute on chronic kidney injury  Appears baseline ([id number]) ranged 1.1-1.2 with GRF >60. More recently since 01/2017 has been 1.3-1.6 (GRF 50s to >60). Of note, did get IV contrast with CT on 1/16/18. Creatinine 2.31  on admission, likely exacerbated by hyperuricemia/TLS and dehydration (given poor PO intake). Cr now normalized, 1.1 today  - s/p rasburicase (1/24)  - UA with 1 hyaline cast  - lymphoma does not appear to be involving/affecting the kidneys based on PET/CT  - continue to trend with BMP q8hrs at this time, especially with continuing slow pre-phase treatment  - hold/limit nephrotoxic meds as able  - appreciate Nephrology consult to optimize management pending trend of Cr with pre-phase treatment/chemotherapy  - strict I/Os  - TPN started 1/26    #Metabolic acidosis  2/2 GI losses with watery diarrhea. Bicarb now stable.    GI:  #Abdominal bloating, distention, early satiety  2/2 lymphomatous involvement.   - simethicone and tramadol PRN discomfort or pain    #Watery, mucous-like stools.   Pt is s/p colonscopy on 1/19. Anticipate ongoing symptoms are due to lymphomatous involvement of the colon. Would like to avoid any bowel meds (anti-motility and stimulating laxatives) at this time as pt is also at risk for bowel perforation, especially as we begin treatment, due to lymphoma involvement.   - although less likely, did r/o infection with C.diff (negative) and enteric panel (negative)  - imodium prn    #Mild transaminitis.   AST mildly elevated. On PET/CT there does not appear to be any substantial involvement in the liver.   - trend hepatic panel q48hrs    ENDO:  #Hypoglycemia - RESOLVED  Overnight 1/24-1/25 BGs were 53-60s. Likely 2/2 poor PO intake throughout 1/24 followed by NPO status overnight (1/24-1/25) in anticipation of PET scan. S/p D50 x once, glucose gel.   - monitor BG with steroids now on board (1/25-1/29)    FEN  - on full liquids, tolerating minimal PO intake, continue as tolerated  - monitor lytes, no current repletion protocol in place given BING. Monitor closely with start of TPN, concern for possible refeeding. Add standing lyte repletion pending trend of Cr.     PPx/Misc:  - continue ASA as above  -  added Lovenox ppx -- on HOLD for now with GIB  - add GI ppx with steroids now on board  - patient declined PT consult     Audra Carrion PA-C  Hematology/Oncology  Pager: [phone]    Interval History   No further bloody stools. Hgb stable.  No other e/o bleeding. Feeling better this AM. Walking. Tolerating PO intake better.No abd pain. No fevers, sweats, chills, SOB. Feels a little swollen in his ankles, no SOB.     Physical Exam   Temp: 95.5  F (35.3  C) Temp src: Axillary BP: (!) 158/94 Pulse: 76 Heart Rate: 78 Resp: 16 SpO2: 96 % O2 Device: None (Room air)    Vitals:    01/30/18 0945 01/31/18 0700 02/01/18 0728   Weight: 61.6 kg (135 lb 12.9 oz) 63.7 kg (140 lb 8 oz) 64.7 kg (142 lb 9.6 oz)     Vital Signs with Ranges  Temp:  [95.4  F (35.2  C)-96.3  F (35.7  C)] 95.5  F (35.3  C)  Pulse:  [76] 76  Heart Rate:  [71-78] 78  Resp:  [16-20] 16  BP: (148-162)/(79-94) 158/94  SpO2:  [95 %-97 %] 96 %  I/O last 3 completed shifts:  In: 4745.9 [P.O.:810; I.V.:1738.33; IV Piggyback:584]  Out: 2155 [Urine:2155]  Constitutional: Pleasant man, thin, seen resting in bed in NAD. Alert, interactive.   HEENT: NC/AT. MMM.  Respiratory: Breathing even and non-labored on RA. Able to sit up for exam. Lungs CTA b/l, no wheezing or crackles.  Cardiovascular: RRR, no murmur. No peripheral edema.   GI: Normoactive bowel sounds. Hyperactive in RUQ. Abd softer today, mildly distended, nontender.   Skin: Clean, dry, intact.   Musculoskeletal: Extremities thin, trace edema b/l LE  Neurologic: Alert and oriented. Speech normal. No focal deficits.   Neuropsychiatric: Calm, mentation appears normal, affect congruent.        Medications     parenteral nutrition - ADULT compounded formula 50 mL/hr at 01/31/18 2013     - MEDICATION INSTRUCTIONS -       NaCl       IV fluid REPLACEMENT ONLY       - MEDICATION INSTRUCTIONS -         lisinopril  10 mg Oral Daily     aspirin EC  162 mg Oral Daily     torsemide  20 mg Oral Daily     dexamethasone  40 mg  Oral Daily     filgrastim (NEUPOGEN/GRANIX) subcutaneous  300 mcg Subcutaneous Daily at 8 pm     prednisoLONE acetate  1 drop Both Eyes 4x Daily     artificial saliva  5-10 mL Swish & Spit 4x Daily     famotidine  20 mg Intravenous Q24H     lipids  250 mL Intravenous Once per day on Mon Tue Wed Thu Fri     allopurinol  300 mg Oral Daily     metoprolol succinate (TOPROL-XL) 24 hr tablet 100 mg  100 mg Oral Daily     sodium chloride (PF)  10 mL Intracatheter Q7 Days       Data   Results for orders placed or performed during the hospital encounter of 01/24/18 (from the past 24 hour(s))   Basic metabolic panel   Result Value Ref Range    Sodium 138 133 - 144 mmol/L    Potassium 4.5 3.4 - 5.3 mmol/L    Chloride 99 94 - 109 mmol/L    Carbon Dioxide 30 20 - 32 mmol/L    Anion Gap 10 3 - 14 mmol/L    Glucose 190 (H) 70 - 99 mg/dL    Urea Nitrogen 57 (H) 7 - 30 mg/dL    Creatinine 1.08 0.66 - 1.25 mg/dL    GFR Estimate 67 >60 mL/min/1.7m2    GFR Estimate If Black 81 >60 mL/min/1.7m2    Calcium 7.6 (L) 8.5 - 10.1 mg/dL   Lactate Dehydrogenase   Result Value Ref Range    Lactate Dehydrogenase 4299 (H) 85 - 227 U/L   Phosphorus   Result Value Ref Range    Phosphorus 5.6 (H) 2.5 - 4.5 mg/dL   Uric acid   Result Value Ref Range    Uric Acid 3.0 (L) 3.5 - 7.2 mg/dL   CBC with platelets differential   Result Value Ref Range    WBC 8.0 4.0 - 11.0 10e9/L    RBC Count 3.45 (L) 4.4 - 5.9 10e12/L    Hemoglobin 10.6 (L) 13.3 - 17.7 g/dL    Hematocrit 32.5 (L) 40.0 - 53.0 %    MCV 94 78 - 100 fl    MCH 30.7 26.5 - 33.0 pg    MCHC 32.6 31.5 - 36.5 g/dL    RDW 14.3 10.0 - 15.0 %    Platelet Count 100 (L) 150 - 450 10e9/L    Diff Method Automated Method     % Neutrophils 91.7 %    % Lymphocytes 4.2 %    % Monocytes 3.4 %    % Eosinophils 0.0 %    % Basophils 0.0 %    % Immature Granulocytes 0.7 %    Nucleated RBCs 0 0 /100    Absolute Neutrophil 7.4 1.6 - 8.3 10e9/L    Absolute Lymphocytes 0.3 (L) 0.8 - 5.3 10e9/L    Absolute Monocytes 0.3  0.0 - 1.3 10e9/L    Absolute Eosinophils 0.0 0.0 - 0.7 10e9/L    Absolute Basophils 0.0 0.0 - 0.2 10e9/L    Abs Immature Granulocytes 0.1 0 - 0.4 10e9/L    Absolute Nucleated RBC 0.0    Magnesium   Result Value Ref Range    Magnesium 1.8 1.6 - 2.3 mg/dL   INR   Result Value Ref Range    INR 1.06 0.86 - 1.14   Basic metabolic panel   Result Value Ref Range    Sodium 135 133 - 144 mmol/L    Potassium 4.2 3.4 - 5.3 mmol/L    Chloride 94 94 - 109 mmol/L    Carbon Dioxide 29 20 - 32 mmol/L    Anion Gap 11 3 - 14 mmol/L    Glucose 276 (H) 70 - 99 mg/dL    Urea Nitrogen 61 (H) 7 - 30 mg/dL    Creatinine 1.11 0.66 - 1.25 mg/dL    GFR Estimate 65 >60 mL/min/1.7m2    GFR Estimate If Black 79 >60 mL/min/1.7m2    Calcium 7.2 (L) 8.5 - 10.1 mg/dL   Lactate Dehydrogenase   Result Value Ref Range    Lactate Dehydrogenase 3803 (H) 85 - 227 U/L   Phosphorus   Result Value Ref Range    Phosphorus 5.6 (H) 2.5 - 4.5 mg/dL   Uric acid   Result Value Ref Range    Uric Acid 3.3 (L) 3.5 - 7.2 mg/dL

## 2018-02-01 NOTE — ANESTHESIA PROCEDURE NOTES
ANESTHESIOLOGY RESIDENT/CRNA INTUBATION NOTE  Indication for intubation: respiratory insufficiency, altered level of consciousness.  Provider Ordering Intubation: PAYTON DELACRUZ  History regarding the most recent potassium obtained: Yes  History regarding renal failure obtained: Yes  History of presence or absence of CVA/stroke was obtained: Yes  History of presence or absence of NM disorder obtained: Yes  Post Intubation:  No apparent complications, Sedation to be ordered by primary/ICU team, Primary/ICU team to review CXR, Vent settings by primary/ICU team, ETT secured and Report given to primary nurse and/or team    On arrival, found patient with altered level of consciousness, responding minimally to verbal stimuli. On BiPAP initially, switched to BVM ventilation with assitance. History obtained. Induced with 50mg Propofol and 50mg Rocuronium. Easy DIRECT VIEW (CMAC used), 7.5 ETT passed through cords, at 22 at teeth. Stable hemodynamically throughout.    **PARALYSIS GIVEN** ANTICIPATE ~1-2 hours of residual paralysis

## 2018-02-01 NOTE — PLAN OF CARE
Problem: Patient Care Overview  Goal: Plan of Care/Patient Progress Review  Outcome: No Change  Patient had upsetting night shift- roommate talked tersely to pts wife and son and staff to be quiet-so son left to spend night in visitors lounge and then wife/mother left to talk to son then pt went in halls looking for wife and son and patient stated he wanted to be discharged later today- was requesting tpn and iv ns rates be changed so he could leave later in day. Reminded pt that we needed to monitor labs and ivfs needed to be changed by team and not moonlighter. Requested antiemetic as he was slightly nauseated-received 5 mg compazine.careful ins and outs continue.

## 2018-02-01 NOTE — PLAN OF CARE
Problem: Patient Care Overview  Goal: Plan of Care/Patient Progress Review  Pt c/o sob awhile walking  At 1330. o2 deborah 89-90% RA.2l nc started to keep o2 deborah > 92%. DONALD kramer came examed pt and given lasix 40 mg. uo 500cc after 1st dose lasix. C/o severe sob after walked to b/r. 5l nc/fm statred to keep o2 deborah > 92%.cxray done.pt was laert/oriented. Wife was with pt and very scared about pts status. Rt called/pa, provider called about pts status.tpn dcd at this time.

## 2018-02-02 NOTE — PROGRESS NOTES
"CLINICAL NUTRITION SERVICES - REASSESSMENT NOTE     Nutrition Prescription    RECOMMENDATIONS FOR MDs/PROVIDERS TO ORDER:  -D/C Regular Diet and change to NPO with intubation  -If pt to receive TFs while proning, ppFT will need to be placed prior to initiation. Weekend consult: \"XR Feeding Tube placement\"; Weekday consult: \"NUTR Post Pyloric Bedside Feeding Tube Placement\"  -Once ready to start TFs, please send nutrition consult for \"RD to Assess/Order TF per MNT protocol\"    Malnutrition Status:    Severe malnutrition in the context of acute illness    Recommendations already ordered by Registered Dietitian (RD):  None at this time    Future/Additional Recommendations:  1. When ready to start EN, would recommend:   -Peptamen 1.5 @ goal 55 ml/hr (1320 ml/day) to provide 1980 kcals (33 kcal/kg/day), 90 g PRO (1.5 g/kg/day), 1016 ml free H2O, 74 g Fat (70% from MCTs), 248 g CHO and no Fiber daily.  -15 mL liquid MVI for micronutrient needs  -30 mL water flush q4h for tube patency    2. If PN desired, would use previous formulation (see below)     EVALUATION OF THE PROGRESS TOWARD GOALS   Diet: Over past week, pt has been on full/regular diets with minimal PO intake    Nutrition Support: 1/26-2/2: CPN, goal volume 1200 ml/day with initial 90 g Dex daily (306 kcal, GIR 1.0), 90 g AA daily (360 kcal), and 250 ml 20% IV lipids x 5 days per week.  --ONLY once pt receives ~100% of initial continuous PN volume with K+/Mg++/Phos WNL, advance PN dex by 30 g every 1-2 days (pending lytes/Glu and Pharm D/RD discretion) to goal of 250 g Dex (850 kcal) to increase provisions to 1567 kcals  (26 kcal/kg/day), 1.5 g PRO/kg/day, GIR 2.9 with 22 % kcals from Fat.  1/27; Dex increased to 120 g (408 kcals)  1/28; Dex increased to 160 g (544 kcals)  1/29; Dex increased to 200 g ( 680 kcals)  1/31: Dex increased to goal of 250 g (850 kcals)  2/1: Lipids discontinued    Intake: 6 day PN average provisions: 1264 kcal (21 kcal/kg) and 90 g " PRO (1.5 g/kg)       NEW FINDINGS   2/1: INTUB, OGT (AXR)  2/2: Proning initiated    GI: MICU team would like to stop PN and hold off on EN at this time r/t pt's hemodynamic instability. Plan to start once pt stable    Labs: A1c (2/1) = 6.4% (H); Phos (2/2) = 9.1 (H)    Cardiac: EF for to be ~20%, thiamin started 2/2    MALNUTRITION  % Intake: Decreased intake does not meet criteria  % Weight Loss: None noted  Subcutaneous Fat Loss: Facial region, Upper arm and Lower arm:  Mild-Moderate  Muscle Loss: Temporal, Facial & jaw region, Thoracic region (clavicle, acromium bone, deltoid, trapezius, pectoral), Upper arm (bicep, tricep), Lower arm  (forearm), Dorsal hand and Upper leg (quadricep, hamstring):  Moderate  Fluid Accumulation/Edema: Does not meet criteria (trace)  Malnutrition Diagnosis: Severe malnutrition in the context of acute illness    Previous Goals   Total avg nutritional intake to meet a minimum of 25 kcal/kg and 1.2 g PRO/kg daily (per dosing wt 60 kg).  Evaluation: Met for PRO, not for kcal    Previous Nutrition Diagnosis  Inadequate protein-energy intake related to inability to consume oral intake d/t need for bowel rest d/t disease state and PN therapy ordered, but not yet initiated as evidenced by diet changed to NPO and no PN intakes received at this time.    Evaluation: Improving    CURRENT NUTRITION DIAGNOSIS  Inadequate energy intake related to TPN with slow advancement and minimal PO intake as evidenced by 6 day average intake of 21 kcal/kg      INTERVENTIONS  Implementation  Collaboration with other providers - MICU rounds  Enteral Nutrition - Recs    Goals  Total avg nutritional intake to meet a minimum of 25 kcal/kg and 1.2 g PRO/kg daily (per dosing wt 60 kg).    Monitoring/Evaluation  Progress toward goals will be monitored and evaluated per protocol.    Megan Martinez RDN, ANGI  Pgr: [phone]

## 2018-02-02 NOTE — PLAN OF CARE
Problem: Patient Care Overview  Goal: Plan of Care/Patient Progress Review  Outcome: No Change  Neuro: sedated, not following commands. Propofol gtt infusing.   Cardiac: ST, -110's. Norepinephrine started for hypotension.   Respiratory: CMV , RR 18, PEEP 15, FiO2 100%. Unable to obtain accurate SpO2 reading, MD aware. Copious bloody secretions.   GI/: López patent. Abdomen distended.   Diet/appetite: NPO     Plan: Continue with POC. Notify primary team with changes.

## 2018-02-02 NOTE — PROGRESS NOTES
[facility], [place]   Antimicrobial Management Team (AMT) Note              To: MICU Team  Unit: 4C  Allergies   Allergen Reactions     Cyclophosphamide Other (See Comments)     interstitial pneumonitis     Sulfa Drugs Rash       Brief Summary: Tristen Beckwith is a 74 y/o M admitted on 1/24 with h/o DLBCL in 1990s s/p RCHOP, now with follicular lymphoma, and anterior wall MI s/p stenting LAD. Presented to oncology clinic apt on 1/21 w/ 1wk of abd distension, fatigue, and decreased appetite, found to have BING (Cr 2.31, baseline 1.3 - 1.6), and hyperuricemia (~12) now admitted for initiation of chemotherapy 2/2 recurrent DLBCL. Started R-DHAX with Rituxan on 1/28, followed by oxaliplatin and cytarabine on 1/30, now D+3.  HPI: On 2/1 became dyspnic and was transferred to the MICU with hypercapnic respiratory failure requiring intubation, thought to be 2/2 flash pulmonary edema vs. ARDS. His WBC was 8.0 with ANC=7.4 that morning, but later WBC dropped to 0.3, diff still pending. His lactic acid was checked and found to be 11.9 which later decreased to 8.  2/1 Abd XR showed marked gaseous distension of the stomach and patchy LLL airspace opacities, likely atelectasis and/or infection. 2/1 CXR showed increased right-sided pleural effusion with associated RLL atelectasis/consolidation, LLL opacities, likely representing atelectasis, and interstitial opacity primarily in the left midlung likely an atypical infection but edema stays in the differential. He was empirically started on vancomycin, Zosyn, ciprofloxacin, and micafungin on 2/2.  2/2 CXR had slightly decreased bilateral airspace opacities, pulmonary edema, atelectasis and/or pneumonia. His WBC remained decreased at 0.2 with differential pending and remains afebrile. However, he is hypotensive requiring NE, phenylephrine (transitioning to epi), and vasopressin, and intubated requiring 100% FIO2 with a PEEP=15. Lactic acid increased back up  to 9.7 and he also has developed shock liver with increasing INR. He is now paralyzed and proned and CRRT and/or VV Ecmo are being considered. Additionally, he was given a 1x dose of tobramycin on 2/2.    Interval History  1/16/2018 CT w/ contrast: pancreatic fullness and colonic thickening  2/1/2018 BCx: NGTD    Assessment:  Shock (cardiogenic vs. septic):  #Respiratory failure 2/2 edema vs. ARDS vs. HAP: The patient became neutropenic on 2/1and CXR findings from 2/1 & 2/2 are concerning for infection. A sputum culture and gram stain was ordered but not obtained and a blood culture obtained on 2/1 remains negative.  He is still requiring high amounts of O2, on FIO2 of 100% and PEEP=15 while paralyzed & proned and lactic acid is improved from yesterday but still elevated. Prior to desaturation and intubation, the patient was generally healthy and did not have risk factors for acquiring MRSA or Pseudomonas. However, he has been hospitalized since 1/24 and HAP cannot be ruled out. Vancomycin and Zosyn can be continued at this time. MRSA nares should be checked and if negative vancomycin can be discontinued. Given the timing of onset, CAP is unlikely and concern for atypicals is low. We are assuming that ciprofloxacin was added as empiric therapy for dual Pseudomonas coverage. Based off [facility] antibiogram, ciprofloxacin does not add much benefit to Zosyn for dual Pseudomonas coverage as sensitivities are only 73%. Therefore, agree with 1x dose of tobramycin on 2/2 as this is a preferred agent for dual Pseudomonal coverage. Recommend continuing tobramycin and discontinuing ciprofloxacin. Blood cultures should also be obtained to rule out bacteremia.   He also has DLBCL of colon with high risk of perforation per heme/onc and reports of bloody secretions. Although the abdominal XR on 2/1 was negative for perforation, the patient remains at risk and has an unknown source of shock. Micafungin is not inappropriate at this  time, but can be discontinued if GI perforation is ruled out. Zosyn will also cover any gram-negative and anaerobic pathogens.     Recommendations:  1. Repeat blood cultures x2 to r/o bacteremia.  2. Continue Zosyn.  3. Continue micafungin, d/c if GI perforation is ruled out.  4. Obtain MRSA nares. If negative, d/c vancomycin.   5. Continue tobramycin and discontinue ciprofloxacin for dual Pseudomonal coverage.   6. ID consult could be considered if patient not improving on recommended regimen.    Discussed with ID Staff - Dr. Cornel Bhakta MD and Fallon Viramontes, StephD  Kelsea Meade, PharmD  PGY1 Pharmacy Practice Resident  Pager: [phone]    Current Anti-infective Orders:  Anti-infectives (Future)    Start     Dose/Rate Route Frequency Ordered Stop    02/03/18 0205  ciprofloxacin (CIPRO) infusion 400 mg      400 mg  over 1 Hours Intravenous EVERY 24 HOURS 02/02/18 1003      02/02/18 1300  piperacillin-tazobactam (ZOSYN) 2.25 g in 10 mL NS Premix Syringe      2.25 g  over 30 Minutes Intravenous EVERY 6 HOURS 02/02/18 1003      02/02/18 1002  vancomycin place zimmerman - receiving intermittent dosing      1 each Does not apply SEE ADMIN INSTRUCTIONS 02/02/18 1003      02/02/18 0945  tobramycin (NEBCIN) 200 mg in NaCl 0.9 % intermittent infusion      3 mg/kg × 64.7 kg (Dosing Weight)  over 60 Minutes Intravenous ONCE 02/02/18 0942      02/02/18 0400  micafungin (MYCAMINE) 100 mg in NaCl 0.9 % 100 mL intermittent infusion      100 mg  100 mL/hr over 60 Minutes Intravenous EVERY 24 HOURS 02/02/18 0258            Vitals and other clinical features  Vitals       01/31 0700  -  02/01 0659 02/01 0700  -  02/02 0659 02/02 0700  -  02/02 1224   Most Recent    Temp ( F) 95.2 -  96.3    95.4 -  99.2      100.3     100.3 (37.9)    Pulse 76 -  90      96       96    Heart Rate 68 -  83    74 -  118    87 -  140     130    Resp 16 -  20    16 -  (!)42      30     30    /79 -  174/89    (!)54/39 -  (!) 188/110       (!)  88/61    SpO2 (%) 95 -  97    (!)70 -  100    (!)88 -  100     93        Temperature curve:      Labs  Estimated Creatinine Clearance: 26.1 mL/min (based on Cr of 2.34).  Recent Labs   Lab Test  02/02/18   1000  02/02/18   0300  02/02/18   0010  02/01/18   2131  02/01/18   1715  02/01/18   0531   CR  2.34*  1.87*  1.66*  1.61*  1.27*  1.11       Recent Labs   Lab Test  02/02/18   0300  02/01/18   2240  02/01/18   2131  02/01/18   0531  01/31/18   0546  01/30/18   2115   01/30/18   0543   01/29/18   0532  01/28/18   2129  01/28/18   0539   WBC  0.2*  0.3*  0.6*  8.0  6.4   --    --   7.6   < >  5.9  5.8  7.8   ANEU   --    --    --   7.4  5.9   --    --   7.2   --   5.3  4.8  6.4   ALYM   --    --    --   0.3*  0.3*   --    --   0.2*   --   0.3*  0.7*  0.7*   JARETT   --    --    --   0.3  0.1   --    --   0.2   --   0.3  0.3  0.7   AEOS   --    --    --   0.0  0.0   --    --   0.0   --   0.0  0.0  0.0   HGB  11.1*  12.7*  14.5  10.6*  10.6*  11.3*   < >  12.3*   < >  11.6*  11.8*  11.7*   HCT  34.1*  38.4*  44.8  32.5*  32.6*   --    --   37.7*   < >  36.3*  34.7*  35.9*   MCV  94  93  94  94  93   --    --   95   < >  95  94  95   PLT  55*  71*  90*  100*  102*   --    --   110*   < >  132*  116*  138*    < > = values in this interval not displayed.       Recent Labs   Lab Test  02/02/18   0536  02/01/18 2131 01/31/18   0546  01/29/18   0532  01/28/18 2129 01/27/18   0544   BILITOTAL  1.3  0.8  0.4  0.5  0.5  0.4   ALKPHOS  40  66  46  57  61  60   ALBUMIN  2.6*  2.0*  2.3*  2.7*  2.8*  2.7*   AST  4526*  150*  124*  79*  86*  87*   ALT  2848*  84*  21  24  26  24       Recent Labs   Lab Test  02/02/18   0947  02/02/18   0830   LACT  10.2*  9.2*     No lab results found.    Invalid input(s): AMIK    Culture results with specimen source  Culture Micro   Date Value Ref Range Status   02/01/2018 (A)  Preliminary    Cultured on the 1st day of incubation:  Gram negative rods     02/01/2018   Preliminary    Critical  Value/Significant Value, preliminary result only, called to and read back by  Danuta Patel R.N. on [id number] at 11:42pm on 02.02.18 JT.     01/24/2018 <10,000 colonies/mL  mixed urogenital carol    Final    Specimen Description   Date Value Ref Range Status   02/01/2018 Blood Left Hand  Final   01/25/2018 Feces  Final   01/24/2018 Midstream Urine  Final   10/13/2012 Nares  Final        Urine Studies     Recent Labs   Lab Test  01/24/18   2130  10/13/12   1204   URINEPH  5.0  7.5*   NITRITE  Negative  Negative   LEUKEST  Negative  Negative   WBCU  2  <1       CSF Testing  No lab results found.    Respiratory Virus Testing    Respiratory Virus Source   Date Value Ref Range Status   03/27/2014 Nasal  Final     Influenza A   Date Value Ref Range Status   03/27/2014 Negative NEG Final     Influenza A, H1   Date Value Ref Range Status   03/27/2014 Negative NEG Final     Influenza A, H3   Date Value Ref Range Status   03/27/2014 Negative NEG Final     Influenza A 2009 H1N1   Date Value Ref Range Status   03/27/2014 Negative NEG Final     Influenza B   Date Value Ref Range Status   03/27/2014 (A) NEG Final    Positive   Critical Value/Significant Value called to and read back by  PRIYA PARKER RN 6C 3.28.2014 ARF     Respiratory Syncytial Virus A   Date Value Ref Range Status   03/27/2014 Negative NEG Final     Respiratory Syncytial Virus B   Date Value Ref Range Status   03/27/2014 Negative NEG Final     Parainfluenza Virus 1   Date Value Ref Range Status   03/27/2014 Negative NEG Final     Parainfluenza Virus 2   Date Value Ref Range Status   03/27/2014 Negative NEG Final     Parainfluenza Virus 3   Date Value Ref Range Status   03/27/2014 Negative NEG Final     Human Metapneumovirus   Date Value Ref Range Status   03/27/2014 Negative NEG Final     Human Rhinovirus   Date Value Ref Range Status   03/27/2014 Negative NEG Final     Adenovirus Species B/E   Date Value Ref Range Status   03/27/2014 Negative NEG Final      Adenovirus Species C   Date Value Ref Range Status   03/27/2014 Negative NEG Final     Last check of C difficile  C Diff Toxin B PCR   Date Value Ref Range Status   01/25/2018 Negative NEG^Negative Final     Comment:     Negative: Clostridium difficile target DNA sequences NOT detected, presumed   negative for Clostridium difficile toxin B or the number of bacteria present   may be below the limit of detection for the test.  FDA approved assay performed using [employer] GeneXpert real-time PCR.  A negative result does not exclude actual disease due to Clostridium difficile   and may be due to improper collection, handling and storage of the specimen   or the number of organisms in the specimen is below the detection limit of the   assay.         Imaging:  Xr Chest Port 1 View    Result Date: 2/2/2018  XR CHEST PORT 1 VW  2/2/2018 4:26 AM  HISTORY: Line placement and MICHELLE; COMPARISON: 2/1/2018 FINDINGS: ET tube tip 5 cm above the level of heraclio. Right IJ central venous catheter tip projecting over the low SVC. Right arm PICC projecting over the low SVC. Gastric tube partially seen. Normal heart size. Slightly decreased patchy left airspace opacities and additional right lower lung opacities. No pleural effusion or pneumothorax.     IMPRESSION: 1. Interval placement of right IJ central venous catheter tip at low SVC. 2. Slightly decreased bilateral airspace opacities, pulmonary edema, atelectasis and/or pneumonia. I have personally reviewed the examination and initial interpretation and I agree with the findings. JESS PALMER MD    Xr Chest Port 1 View    Result Date: 2/1/2018  XR CHEST PORT 1 VW  2/1/2018 5:51 PM  HISTORY: Endotracheal tube positioning; COMPARISON: Earlier today 2/1/2018 FINDINGS: Right PICC in the lower SVC. Endotracheal tube tip is 6.1 cm from the heraclio. Increased patchy airspace opacities in the left perihilar region and lower left lung. No pneumothorax or pleural effusion. Stable  cardiomediastinal silhouette.     IMPRESSION: 1. Endotracheal tube tip is 6.1 cm from heraclio. 2. Increased patchy airspace opacities in the left perihilar region and in the left lower lung. Findings concerning for infection or aspiration. I have personally reviewed the examination and initial interpretation and I agree with the findings. DES LUGO MD    Xr Chest Port 1 View    Result Date: 2/1/2018  Exam:  Chest X-ray 2/1/2018 2:39 PM History: new onset SOB and cough, suspect pulm edema, hx HF and weight/fluid up today; Comparison: 1/24/2018 Findings: AP semiupright chest radiograph is obtained. Right sided PICC with tip projecting over the mid SVC. Cardiac mediastinal silhouette is unchanged. Aortic calcifications. Pulmonary vasculature is mildly indistinct. Increased hazy opacity in the right lower lung with silhouetting of the right hemidiaphragm, consistent with pleural effusion. Right greater than left basilar opacities, increased compared to prior. More focal perihilar interstitial opacity on the left although edema could have this appearance its unilateral appearance suggests an infection. No large left pleural effusion. No pneumothorax. The upper abdomen is unremarkable.     Impression: 1.  Increased right-sided pleural effusion with associated right lower lobe atelectasis/consolidation. 2.  Left lower lobe opacities, likely represent atelectasis. 3. Interstitial opacity primarily in the left midlung likely an atypical infection. Edema stays in the differential. I have personally reviewed the examination and initial interpretation and I agree with the findings. JESS PALMER MD    Xr Abdomen Port 1 View    Result Date: 2/2/2018  XR ABDOMEN PORT F1 VW  2/1/2018 10:06 PM  HISTORY: ng; COMPARISON: None FINDINGS: Marked gaseous distention of the stomach. Gastric tube sidehole projecting over the body of the stomach. Paucity of bowel gas in the abdomen. Patchy airspace opacities in the left lower  lung. Marked lumbar spondylosis.     IMPRESSION: 1. Gastric tube sidehole projecting over the body of the stomach. Marked gaseous distention of the stomach. 2. Patchy left lower lung airspace opacities, likely atelectasis and/or infection. I have personally reviewed the examination and initial interpretation and I agree with the findings. JESS PALMER MD

## 2018-02-02 NOTE — PROCEDURES
"Procedure/Surgery Information   [facility], [place]    Bedside Procedure Note  Date of Service (when I performed the procedure): 02/02/2018    Tristen Beckwith is a 73 year old male patient.  1. Acute respiratory failure with hypoxia (H)    2. Diffuse large B-cell lymphoma, unspecified body region (H)    3. 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  Risks and benefits: risks, benefits and alternatives were discussed  Consent given by: spouse  Patient understanding: patient states understanding of the procedure being performed  Patient consent: the patient's understanding of the procedure matches consent given  Procedure consent: procedure consent matches procedure scheduled  Relevant documents: relevant documents present and verified  Test results: test results available and properly labeled  Site marked: the operative site was marked  Imaging studies: imaging studies available  Required items: required blood products, implants, devices, and special equipment available  Patient identity confirmed: arm band  Time out: Immediately prior to procedure a \"time out\" was called to verify the correct patient, procedure, equipment, support staff and site/side marked as required.  Indications: acute hemodialysis.  Anesthesia method: deep sedation.    Sedation:  Patient sedated: yes  Sedatives: see MAR for details    Preparation: skin prepped with chlorhexidine  Location details: left " femoral  Patient position: flat  Catheter type: double lumen  Pre-procedure: landmarks identified  Ultrasound guidance: yes  Number of attempts: 1  Successful placement: yes  Post-procedure: line sutured and dressing applied  Assessment: blood return through all parts and free fluid flow  Patient tolerance: Patient tolerated the procedure well with no immediate complications           Tianna Mcdaniel

## 2018-02-02 NOTE — PHARMACY-VANCOMYCIN DOSING SERVICE
Pharmacy Vancomycin Initial Note  Date of Service 2018  Patient's  1945  73 year old, male    Indication: Sepsis    Current estimated CrCl = Estimated Creatinine Clearance: 48.1 mL/min (based on Cr of 1.27).    Creatinine for last 3 days  2018:  5:43 AM Creatinine 1.15 mg/dL;  5:12 PM Creatinine 1.05 mg/dL  2018:  5:46 AM Creatinine 1.00 mg/dL;  5:26 PM Creatinine 1.08 mg/dL  2018:  5:31 AM Creatinine 1.11 mg/dL;  5:15 PM Creatinine 1.27 mg/dL    Recent Vancomycin Level(s) for last 3 days  No results found for requested labs within last 72 hours.      Vancomycin IV Administrations (past 72 hours)      No vancomycin orders with administrations in past 72 hours.                Nephrotoxins and other renal medications (Future)    Start     Dose/Rate Route Frequency Ordered Stop    18 0900  vancomycin (VANCOCIN) 1,250 mg in NaCl 0.9 % 250 mL intermittent infusion      1,250 mg  over 90 Minutes Intravenous EVERY 12 HOURS 18 19018  furosemide (LASIX) injection 40 mg      40 mg  over 1-2 Minutes Intravenous EVERY 6 HOURS 18 18418 19518 191  vancomycin (VANCOCIN) 1,500 mg in NaCl 0.9 % 250 mL intermittent infusion      1,500 mg  over 90 Minutes Intravenous ONCE 18 19018 190  piperacillin-tazobactam (ZOSYN) 4.5 g in 20 mL NS Premix Syringe      4.5 g  over 3 Minutes Intravenous EVERY 6 HOURS 18 18418 181  norepinephrine (LEVOPHED) 16 mg in D5W 250 mL infusion      0.03-0.4 mcg/kg/min × 64.7 kg  1.8-24.3 mL/hr  Intravenous CONTINUOUS 18 1802      18 1145  lisinopril (PRINIVIL/ZESTRIL) tablet 10 mg      10 mg Oral DAILY 18 1136      18 1315  torsemide (DEMADEX) tablet 20 mg      20 mg Oral DAILY 18 1301            Contrast Orders - past 72 hours (72h ago through future)    Start     Dose/Rate Route Frequency Ordered Stop    18 1530  iopamidol (ISOVUE-370)  solution 54 mL      54 mL Intravenous ONCE 02/01/18 1525                  Plan:  1.  Start vancomycin  1500 mg iv x 1 then 1250 mg iv q24h   2.  Goal Trough Level: 15-20 mg/L   3.  Pharmacy will check trough levels as appropriate in 1-3 Days.    4. Serum creatinine levels will be ordered daily for the first week of therapy and at least twice weekly for subsequent weeks.    5. Putnam method utilized to dose vancomycin therapy: Method 2    Kalyani Camp, StephD

## 2018-02-02 NOTE — CONSULTS
CARDIOLOGY CONSULTATION    Name: Tristen Beckwith MRN: [id number]     Age: 73 year old   YOB: 1945            HPI:   Reason for Consultation: Hypotension    History is obtained from chart review.     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     He is a 73M, PMH of HTN, HPL, DLBCL (in the 1994, s/p R-CHOP), follicular lymphoma, former smoker, CAD (reportedly presented to ED with anterior MI in 2001, underwent PCI to LAD), who was admitted tumor lysis, BING and treatment initiation.     Overall he has received several liters of fluid this admission (overall 15 L positive) and his Torsemide was held in the setting of BING. On 2/1, in the afternoon he was noted to have increased oxygen requirements. He received 40 mg IV Lasix x 2, however oxygen requirements continued to rise and he had to be intubated and transferred to MICU for hypoxic respiratory failure.     Around the same time he was noted to be hypotensive with a rising SCr and rising lactic acid (peaked at 11.9). He was initiated on NE, Vaso and Phenylephrine. Notably, at the same time his WBC count dropped from 8.0 to 0.6 and his platelet count has been downtrending from 110 to 55.      His Tn bairon from a baseline of 0.052 to 0.116 to 0.307 and then 0.357. EKG showed sinus tachycardia to 103, left axis deviation, LVH, normal intervals, and poor R wave progression. I was unable to assess for wall motion abnormalities on bedside echo (although the EF did appear to be ~35%; lower than the 45-50% documented on 1/24/18). A formal echocardiogram was obtained overnight (detailed below).                Past Medical History:     Past Medical History:   Diagnosis Date     Coronary artery disease      H/O percutaneous transluminal coronary angioplasty      History of acute anterior wall myocardial infarction              Past Surgical History:      Past Surgical History:   Procedure Laterality Date     COLONOSCOPY N/A 1/19/2018    Procedure: COLONOSCOPY;   "colonscopy;  Surgeon: Cristofer French MD;  Location: [facility]             Social History:     Social History   Substance Use Topics     Smoking status: Former Smoker     Smokeless tobacco: Never Used     Alcohol use Not on file             Family History:   No family history on file.          Allergies:     Allergies   Allergen Reactions     Cyclophosphamide Other (See Comments)     interstitial pneumonitis     Sulfa Drugs Rash             Medications:     Prescriptions Prior to Admission   Medication Sig Dispense Refill Last Dose     atorvastatin (LIPITOR) 40 MG tablet TAKE 1 TABLET BY MOUTH DAILY        torsemide (DEMADEX) 20 MG tablet Take 20 mg by mouth daily   1/18/2018     aspirin 325 MG tablet Take 1/2 tablet daily   Taking     Nutritional Supplements (SOY PROTEIN SHAKE) POWD Take 1 dose. by mouth as needed Every other day   Taking     METOPROLOL SUCCINATE PO Take 100 mg by mouth daily.     Taking     lisinopril (PRINIVIL,ZESTRIL) 40 MG tablet Take 1 tablet by mouth daily. 90 tablet 3 Taking                 Exam:   BP (!) 111/31  Pulse 96  Temp 99.2  F (37.3  C) (Axillary)  Resp (P) 30  Ht 1.727 m (5' 8\")  Wt 64.7 kg (142 lb 9.6 oz)  SpO2 100%  BMI 21.68 kg/m2  GEN: intubated, sedated  Neck: No JVD elevation  LUNGS: + mechanical breath sounds. +rales at both bases  HEART: S1S2 audible, no murmurs or rubs. Regular rhythm. +tachy  ABDOMEN: Soft, nt, nd. +BS  EXTREMITIES: Warm calves, no LE edema  NEURO: intubated and sedated           Data:   ROUTINE ICU LABS (Last four results)  CMP  Recent Labs  Lab 02/02/18  0010 02/01/18  2131 02/01/18  1715 02/01/18  0531 01/31/18  1726 01/31/18  0546  01/30/18  0543  01/29/18  0532 01/28/18  2129     --  136 135 138 141  < > 140  < > 140 138   POTASSIUM 4.5  --  4.0 4.2 4.5 4.5  < > 4.4  < > 4.1 4.2   CHLORIDE 94  --  93* 94 99 102  < > 99  < > 99 99   CO2 21  --  28 29 30 28  < > 25  < > 20 20   ANIONGAP 22*  --  15* 11 10 11  < > 16*  < > 22* 20*   *  " --  292* 276* 190* 200*  < > 174*  < > 108* 119*   BUN 76*  --  65* 61* 57* 55*  < > 69*  < > 59* 58*   CR 1.66*  --  1.27* 1.11 1.08 1.00  < > 1.15  < > 1.15 1.14   GFRESTIMATED 41*  --  56* 65 67 74  < > 63  < > 63 63   GFRESTBLACK 50*  --  67 79 81 89  < > 76  < > 76 76   MILY 6.5*  --  6.9* 7.2* 7.6* 7.6*  < > 7.9*  < > 8.4* 8.4*   MAG  --  2.0  --  1.8  --  2.1  --  2.7*  --  2.4*  --    PHOS  --  11.7* 8.8* 5.6* 5.6* 4.8*  < > 5.8*  < > 3.6  --    PROTTOTAL  --  4.6*  --   --   --  4.8*  --   --   --  5.2* 5.4*   ALBUMIN  --  2.0*  --   --   --  2.3*  --   --   --  2.7* 2.8*   BILITOTAL  --  0.8  --   --   --  0.4  --   --   --  0.5 0.5   ALKPHOS  --  66  --   --   --  46  --   --   --  57 61   AST  --  150*  --   --   --  124*  --   --   --  79* 86*   ALT  --  84*  --   --   --  21  --   --   --  24 26   < > = values in this interval not displayed.  CBC  Recent Labs  Lab 02/02/18  0300 02/01/18  2240 02/01/18  2131 02/01/18  0531   WBC 0.2* 0.3* 0.6* 8.0   RBC 3.64* 4.14* 4.79 3.45*   HGB 11.1* 12.7* 14.5 10.6*   HCT 34.1* 38.4* 44.8 32.5*   MCV 94 93 94 94   MCH 30.5 30.7 30.3 30.7   MCHC 32.6 33.1 32.4 32.6   RDW 14.7 14.5 14.5 14.3   PLT 55* 71* 90* 100*     INR  Recent Labs  Lab 02/01/18  0531 01/30/18  0825 01/29/18  0532 01/27/18  0544   INR 1.06 1.11 1.18* 1.21*     Arterial Blood Gas  Recent Labs  Lab 02/02/18  0255 02/02/18  0010 02/01/18  2315 02/01/18  2215 02/01/18  2055   PH 7.33* 7.30*  --  7.31* 7.32*   PCO2 36 42  --  39 36   PO2 71* 59*  --  76* 62*   HCO3 19* 21  --  19* 19*   O2PER 100 100.0 100.0 100 100                Imaging:     Echocardiogram:  2/2/18  Technically difficult study.Poor acoustic windows.  Apical wall and anterior wall akinesis is present. Basal anterolateral and  basal inferoseptum are severely hypokinetic. Basal and mid inferolateral wall  are severely hykopinetic.  Biplane traced at 32%.  The right ventricle is not well visualized. Limited PLAX view suggest  worsening  RV function.  IVC is normal in size. Patient is intubated. RA pressure cannot be estimated.  Doppler interrogation of the valves has not been performed.  No pericardial effusion is present.  This study was compared with the study from 1/24/2018. The apical akinesis  remains the same but the rest of the regional wall motion has worsened.             Assessment and Recomendations:   Assessment and Recs:    - He is a 73M, PMH of HTN, HPL, DLBCL (in the 1994, s/p R-CHOP), follicular lymphoma, former smoker, CAD (reportedly presented to ED with anterior MI in 2001, underwent PCI to LAD), who was admitted tumor lysis, BING and treatment initiation. Cardiology consulted for hypotension.    1- Shock:  - His shock could be septic versus cardiogenic versus mixed. While his sudden drop in WBC count, platelet count and his wide pulse pressure argue for a septic etiology, he has also had a new drop in LVEF.  - He has been appropriately initiated on broad spectrum abx. Has been receiving IV hydration and has been on NE, Vaso and Patrick.  - Would recommend obtaining serial hemodynamic monitoring with CVP and SvO2 (to obtain cardiac index) to drive his shock management. Given his LV dysfunction, would recommend use of IV hydration to be dictated by his filling pressures. The choice of vasoactive agents would also depend on his CI and SVR.     2- Cardiomyopathy:  - His new drop in LVEF could be stress induced cardiomyopathy (from his overall illness) versus ischemic in etiology versus myocarditis (less likely). Notably however the extent of his Tn rise at this point is minimal compared to the new WMAs. The magnitude of his Tn rise in fact is not unexpected given his hypotension and tachycardia.   - Will recommend obtaining serial EKG's  - Will recommend trending Tn to peak  - Given his risk factor profile and history of CAD, and the fact that stress induced CM is a diagnosis of exclusion, will recommend initiating him on low intensity  heparin infusion.        Patient to be formally staffed in the morning.    González Tejada MD  Cardiology Fellow  [phone]      ADDENDUM:  - Based on the mixed venous of 29 (from Rt IJ tripple lumen), his shock at this point is primarily cardiogenic. Will recommend decreasing SVR by gradually discontinuing Patrick and Vasopressin.  - Will recommend Epinephrine if he needs blood pressure support in the interim  - Please continue q4H hemodynamic monitoring  - Rest of the recommendations as above    Discussed with the MICU day team    González Tejada MD  Cardiovascular Disease Fellow  Pager: [phone]      Staff: MICU team withdrew consult after comfort care only decided on.

## 2018-02-02 NOTE — PROGRESS NOTES
HEME/ONC DAILY CONSULT NOTE    ID/CC:   Tristen Beckwith is a 73 year old man with history of DLBCL (1994) and follicular lymphoma (2013) with new/recurrent DLBCL in setting of A/CKI and h/o ischemic cardiomyopathy (most recent Echo EF 46%) admitted for optimization and now undergoing chemotherapy.  Overnight: Transferred to ICU.  Yesterday became SOB and CXR c/w pulm edema.  Given lasix and 02 w/ some improvement.  Then acute worsening requiring rapid response, BiPAP and eventual intubation.   BING.  Sepsis, 3 pressors, ARDS, + klebsiella blood cultures. During attempt at CRRT became hypotensive, had to abort. Family called back to discuss prognosis.   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  Was at very high risk for colonic rupture or abdominal issues given his extensive colonic involvement.  He was treated gingerly with w/ steroids and rituxan 1/25-1/29 followed by oxaliplatin (1/30) and reduced dose cytarabine (1/31).  S/p rasburicase.  Overnight acute sepsis/ICU transfer.  In BING, ARDS, on multiple pressors.  Discussed prognosis w/ his wife and 2 sons at bedside with ICU team.  Long discussion of what we could have done differently or how we could have addressed his acute decompensation sooner.  Also discussed that Dr. Beckwith had understood that this was extremly high risk and that he was nervous about how much chemo he could withstand.  Very said situation and his wife is understandably quite devastated. For now hoping to support him until his brother can arrive ~7pm this evening from [place].  ICU team is primary but of course please page me at any time to help answer questions.  I will notify his primary oncologist, Dr. Mike.    iVlma Soler MD  Pager: [phone]

## 2018-02-02 NOTE — PLAN OF CARE
Problem: Patient Care Overview  Goal: Plan of Care/Patient Progress Review  Outcome: Declining  At start of shift, writer and off-going nurse were doing bedside rounding and noticed pt was in increasing resp distress. Pt was very anxious, coughing up bloody sputum with audible wheezes/crackles heard. 7-15LPM via Oximask applied to maintain sats. HTN noted, tachypneic. Resp called for neb treatment. PA-C notified; ordered an additional 40mg IV Lasix and given. Pt was having increased anxiety and difficulty breathing; RRT called. Ativan 0.5mg and 2mg of Morphine given. Pt was less anxious after this. Pt was placed on CPAP, castelan placed. Decision was made to transfer pt to ICU as he was not responding quickly to CPAP and needed a higher level of care. BiPAP then placed. Just as pt was about to transfer, he became increasingly agitated, trying to get out of bed and pulling at lines and such. An additional 0.5mg of Ativan and 2mg of Morphine given. Decision was then made to intubate as pt was not responding to BiPAP. Code blue was called and intubation was performed. See notes. Family was at bedside and updated throughout. Pt was transferred to MICU. Belongings sent. MICU RN taking over pt's care was present at time of transfer.

## 2018-02-02 NOTE — PHARMACY-AMINOGLYCOSIDE DOSING SERVICE
Pharmacy Aminoglycoside Initial Note  Date of Service 2018  Patient's  1945  73 year old, male    Weight (Actual):  64.7 kg    Indication: Sepsis    Current estimated CrCl = Estimated Creatinine Clearance: 32.7 mL/min (based on Cr of 1.87).    Creatinine for last 3 days  2018:  5:12 PM Creatinine 1.05 mg/dL  2018:  5:46 AM Creatinine 1.00 mg/dL;  5:26 PM Creatinine 1.08 mg/dL  2018:  5:31 AM Creatinine 1.11 mg/dL;  5:15 PM Creatinine 1.27 mg/dL;  9:31 PM Creatinine 1.61 mg/dL  2018: 12:10 AM Creatinine 1.66 mg/dL;  3:00 AM Creatinine 1.87 mg/dL     Nephrotoxins and other renal medications (Future)    Start     Dose/Rate Route Frequency Ordered Stop    18 0945  tobramycin (NEBCIN) 200 mg in NaCl 0.9 % intermittent infusion      3 mg/kg × 64.7 kg (Dosing Weight)  over 60 Minutes Intravenous ONCE 18 0942      18 0900  vancomycin (VANCOCIN) 1,250 mg in NaCl 0.9 % 250 mL intermittent infusion      1,250 mg  over 90 Minutes Intravenous EVERY 24 HOURS 18  vasopressin (PITRESSIN) 40 Units in D5W 40 mL infusion      0.5-4 Units/hr  0.5-4 mL/hr  Intravenous CONTINUOUS 18 21018 211  phenylephrine (MITZI-SYNEPHRINE) 50 mg in NaCl 0.9 % 250 mL infusion      0.5-6 mcg/kg/min × 64.7 kg  9.7-116.5 mL/hr  Intravenous CONTINUOUS 18 21118 190  piperacillin-tazobactam (ZOSYN) 4.5 g in 20 mL NS Premix Syringe      4.5 g  over 3 Minutes Intravenous EVERY 6 HOURS 18 1849      18 181  norepinephrine (LEVOPHED) 16 mg in D5W 250 mL infusion      0.03-1 mcg/kg/min × 64.7 kg  1.8-60.7 mL/hr  Intravenous CONTINUOUS 18 1802            Contrast Orders - past 72 hours (72h ago through future)    Start     Dose/Rate Route Frequency Ordered Stop    18 0130  perflutren diluted 1mL to 2mL with saline (OPTISON) diluted injection 6 mL      6 mL Intravenous ONCE 18 0128 18 0130    18 1530   iopamidol (ISOVUE-370) solution 54 mL  Status:  Discontinued      54 mL Intravenous ONCE 02/01/18 1525 02/02/18 0908          Aminoglycoside Levels - past 2 days  No results found for requested labs within last 48 hours.    Aminoglycosides IV Administrations (past 72 hours)      No aminoglycosides orders with administrations in past 72 hours.                    Plan:  1.  Start Tobramycin 200 mg (3 mg/kg) IV once. Patient decompensating due to likely sepsis. One time dose ordered and can be continued based on provider discretion. Patient also in BING and would require intermittent dosing/levels if continuing.      Bennett Gamez, PharmD

## 2018-02-02 NOTE — PROGRESS NOTES
Calorie Counts  Intake recorded for: 2/1  Kcals: 0  Protein: 0g  # Meals Recorded: 2 meals ordered from kitchen, no intake recorded.   # Supplements Recorded: no intake recorded.

## 2018-02-02 NOTE — PROGRESS NOTES
D; pt intubated with a 7.5 ETT secured 23 @ the lip.  I; pt transported to unit 4c and placed on a drager ventilator with settings per M.D.  A: bilateral breath sounds. Color change with ETCO2  P; cont to monitor

## 2018-02-02 NOTE — PROGRESS NOTES
Critical Care Services Progress Note:  I personally examined and evaluated the patient today. I formulated today s plan with the house staff team or resident(s) and agree with the findings and plan in the associated note (see separately attested resident note).   I have evaluated all laboratory values and imaging studies from the past 24 hours.  Summary of hospital course:  73M h/o DLBCL in 1990s s/p RCHOP, now with follicular lymphoma, has anterior wall MI s/p stenting LAD.  Now admitted for DLBCL to colon.  Started on rituximab and pred as initial treatment.  Got large amount of fluid for TLS, but home torsemide was held.  Overnight events/pertinent findings today:  Worsening status overnight.  Now on multiple pressors and very high vent needs.  Data  Afebrile.  bp ok on levo/phenyl/vaso.  satting borderline on peep15 fio2 100%.  Positive 1L.    Labs (personally reviewed): creat 1.87 (poor UOP), calcium 6.1, ast/alt severely elevated, trop 0.720.  Lactate 9.7.  abg metabolic acidosis: 7.31/34/64/17 on 100%.  INR 3.01.    CXR (personally reviewed):  B/l diffuse infiltrates. ETT and line in acceptable position.  EKG (personally reviewed):  95, sinus, nl axis, nl interval, no ischemia to my eye.  Assessment/plan:  1.  Hypoxemic respiratory failure, vent dependent, acute:  Most likely ARDS.  Intubated, high peep, lung protective vent strategy,paralysis, iflolan, proning.  Discussed with [facility] ecmo team.  Also having swan placed for hd monitoring. Broad spectrum abx, pan cultured.  2.  Shock:  Mixed sepsis and cardiogenic most likely.  EF down to 30%.  Continue levo and vaso.  Stop phenylephrine and replace with epiinephrine.  HD monitoring with swan.  Appreciate cardiology input.  Stress steroids.  Vitamin C protocol.  3.  BING:  Nephrology consult.  Likely CRRT today.  4.  Septic source:  On cipro/zosyn/vanco/cameron.  Culturing as able.  One time dose of tobramycin.  5.  Hyperglycemia:  Insulin drip.  Personally discussed  with [facility] ecmo attending and cardiology fellow.  Rest per resident note.   ADDENDUM 1530:  Attempted to start crrt but bp would not tolerate it (systolics dropped to 40s even with large doses of levo and epi), had to abort.  This occurred despite supporting preload with large dose of albumin and using minimal flow settings.  Repeat bedside echo showed worsened LVEF (now 10% or less on my bedside evaluation), and labs show worsening lactate to 13.8, LDH now suddenly up to 87742 (?ischemic bowel?).  He remains acidotic (metabolic) at 7.18/33/84/12 on 100% fio2.  At this point death is imminent, likely within hours.  I have discussed this with his wife and adult children who are appropriately upset, but understand this.  We discussed DNR status and family is agreeable.  His brother is flying here from [place] this evening, we will continue life sustaining measures at least until this time.  I spent a total of 85 minutes (excluding procedure time) personally providing and directing critical care services at the bedside and on the critical care unit for this patient.     David Barfield

## 2018-02-02 NOTE — PROGRESS NOTES
CRRT INITIATION NOTE    Consent for CRRT Completed: YES     Patient s Vascular Access: Catheter              Placement Confirmed: YES  Manufacture:  UA   Model:  UA  Length/Dominican Size:  UA  Flush Volume: UA    DATA:  Procedure:  CVVHDF  Start Time:  1400  Machine#:  5  Filter:  [id number]  Blood Flow:  180  ML/min  Pre-Replacement Solution:  Prismasol BGK2/3.5  Post-Replacement Solution: Prismasol BGK2/3.5  Dialysate Solution: Prismasol BGK2/3.5  Pre-Replacement Solution Rate:  800 mL/hr  Post-Replacement Solution Rate:  200 mL/hr  Dialysate Flow Rate:  800 mL/hr   Patient Removal Rate:  0 mL/hr  Anticoagulation Type and Rate:  0    ASSESSMENT:  How Patient Tolerated Initiation: pt did not tolerate intiation CRRT was stopped d/t low BP systolic BP in the 30's   Vital Signs:  BP 37/30  Spo2 97 then drops to 75%  Initial Pressures:  Access:  -64  Filter:  64  Return:  -9  TMP:  35  Change in Filter Pressure:  123        INTERVENTIONS:MD called to beside CRRT was stopped immediately.  Pt given Epi IVP for BP.  Pt BP rebounded and recommendation of MD was to not try to start CRRT again    PLAN:family conference for comfort cares

## 2018-02-02 NOTE — PROCEDURES
"Procedure/Surgery Information   [facility], [place]    Bedside Procedure Note  Date of Service (when I performed the procedure): 02/01/2018    Tristen Beckwith is a 73 year old male patient.  1. Diffuse large B-cell lymphoma, unspecified body region (H)    2. 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  Risks and benefits: risks, benefits and alternatives were discussed  Consent given by: spouse  Patient understanding: patient states understanding of the procedure being performed  Patient consent: the patient's understanding of the procedure matches consent given  Procedure consent: procedure consent matches procedure scheduled  Relevant documents: relevant documents present and verified  Test results: test results available and properly labeled  Site marked: the operative site was marked  Imaging studies: imaging studies available  Required items: required blood products, implants, devices, and special equipment available  Patient identity confirmed: arm band  Time out: Immediately prior to procedure a \"time out\" was called to verify the correct patient, procedure, equipment, support staff and site/side marked as required.  Preparation: Patient was prepped and draped in the usual sterile fashion.  Indications: multiple ABGs and respiratory failure  Location: right radial    Sedation:  Patient sedated: yes  Sedatives: midazolam  Analgesia: fentanyl  Joshua's test normal: " no  Needle gauge: 18  Seldinger technique: Seldinger technique used  Number of attempts: 2  Post-procedure: line sutured and dressing applied  Post-procedure CMS: normal  Patient tolerance: Patient tolerated the procedure well with no immediate complications           Toby Gaines

## 2018-02-02 NOTE — CONSULTS
SURGICAL ICU CONSULT  February 2, 2018      REASON FOR CONSULT: Evaluation for ECMO    HISTORY PRESENTING ILLNESS: Tristen Beckwith is a 73 year old male with a hx of B cell lymphoma s/p R-CHOP in 90s, now with follicular lymphoma, HTN, CAD s/p MI and stent, CKD currently admitted to the medical ICU after presenting with acute kidney injury and likely spontaneous tumor lysis progressing to multiorgan failure. He is currently on Epinephrine, Norepi, Vaso, intubated with FIO2 of 100%, paralyzed with cisatracurium and on fentanyl/versed.     REVIEW OF SYSTEMS: unable to obtain, patient intubated.     PAST MEDICAL HISTORY:   Past Medical History:   Diagnosis Date     Coronary artery disease      H/O percutaneous transluminal coronary angioplasty      History of acute anterior wall myocardial infarction        SURGICAL HISTORY:   Past Surgical History:   Procedure Laterality Date     COLONOSCOPY N/A 1/19/2018    Procedure: COLONOSCOPY;  colonscopy;  Surgeon: Cristofer French MD;  Location:  GI       SOCIAL HISTORY:   Social History     Social History     Marital status:      Spouse name: N/A     Number of children: N/A     Years of education: N/A     Occupational History     Not on file.     Social History Main Topics     Smoking status: Former Smoker     Smokeless tobacco: Never Used     Alcohol use Not on file     Drug use: Not on file     Sexual activity: Not on file     Other Topics Concern     Not on file     Social History Narrative     FAMILY HISTORY: No family history on file.    ALLERGIES:      Allergies   Allergen Reactions     Cyclophosphamide Other (See Comments)     interstitial pneumonitis     Sulfa Drugs Rash       MEDICATIONS:    Current Facility-Administered Medications on File Prior to Encounter:  sodium chloride (PF) 0.9% PF flush 62 mL   iopamidol (ISOVUE-370) 76% solution 86 mL   barium sulfate (READI-CAT) oral suspension 2.1% 450 mL   fluorodeoxyglucose F-18 (FDG) radioisotope injection  10.23 [facility]     Current Outpatient Prescriptions on File Prior to Encounter:  torsemide (DEMADEX) 20 MG tablet Take 20 mg by mouth daily   aspirin 325 MG tablet Take 1/2 tablet daily   Nutritional Supplements (SOY PROTEIN SHAKE) POWD Take 1 dose. by mouth as needed Every other day   METOPROLOL SUCCINATE PO Take 100 mg by mouth daily.     lisinopril (PRINIVIL,ZESTRIL) 40 MG tablet Take 1 tablet by mouth daily.       Current Facility-Administered Medications:      micafungin (MYCAMINE) 100 mg in NaCl 0.9 % 100 mL intermittent infusion, 100 mg, Intravenous, Q24H, Fletcher Guzman MD, Last Rate: 100 mL/hr at 02/02/18 0341, 100 mg at 02/02/18 0341     EPINEPHrine (ADRENALIN) 16 mg in NaCl 0.9 % 250 mL infusion, 0.03-0.3 mcg/kg/min (Order-Specific), Intravenous, Continuous, Toby Gaines MD, Last Rate: 15.2 mL/hr at 02/02/18 1100, 0.25 mcg/kg/min at 02/02/18 1100     phytonadione (AQUA-MEPHYTON) 10 mg in NaCl 0.9 % 50 mL intermittent infusion, 10 mg, Intravenous, Daily, Toby Gaines MD, Last Rate: 100 mL/hr at 02/02/18 1107, 10 mg at 02/02/18 1107     cisatracurium (NIMBEX) 200 mg in D5W 100 mL infusion, 3-10 mcg/kg/min (Dosing Weight), Intravenous, Continuous, Sowmya Carter MD, Last Rate: 5.8 mL/hr at 02/02/18 1130, 3 mcg/kg/min at 02/02/18 1130     ascorbic acid 1,500 mg in NaCl 0.9 % 100 mL intermittent infusion, 1,500 mg, Intravenous, Q6H, Toby Gaines MD     thiamine (B-1) 200 mg in NaCl 0.9 % 50 mL intermittent infusion, 200 mg, Intravenous, Q12H, Toby Gaines MD     tobramycin (NEBCIN) 200 mg in NaCl 0.9 % intermittent infusion, 3 mg/kg (Dosing Weight), Intravenous, Once, David Barfield MD, Last Rate: 100 mL/hr at 02/02/18 1138, 200 mg at 02/02/18 1138     piperacillin-tazobactam (ZOSYN) 2.25 g in 10 mL NS Premix Syringe, 2.25 g, Intravenous, Q6H, Anupama Fox MD     [START ON 2/3/2018] ciprofloxacin (CIPRO) infusion 400 mg, 400 mg, Intravenous, Q24H, Anupama Fox  MD [place]     vancomycin place zimmerman - receiving intermittent dosing, 1 each, Does not apply, See Admin Instructions, Anupama Fox MD     sodium bicarbonate 150 mEq in 5% dextrose for infusion, , Intravenous, Continuous, Michelle Caraballo MD, Last Rate: 50 mL/hr at 02/02/18 1114     sodium chloride 0.9 % bag 500mL for CT scan flush use, 85 mL, Intravenous, Once, Jose Luis Armas MD     albuterol neb solution 2.5 mg, 2.5 mg, Nebulization, Q4H PRN, Audra Carrion PA-C     morphine (PF) injection 2 mg, 2 mg, Intravenous, Q4H PRN, Audra Carrion PA-C, 2 mg at 02/01/18 1540     nitroGLYcerin (NITROSTAT) sublingual tablet 0.4 mg, 0.4 mg, Sublingual, Q5 Min PRN, Audra Carrion PA-C     naloxone (NARCAN) injection 0.1-0.4 mg, 0.1-0.4 mg, Intravenous, Q2 Min PRN, Ashley Gerard MD     propofol (DIPRIVAN) infusion, 5-75 mcg/kg/min, Intravenous, Continuous, Stopped at 02/01/18 1935 **AND** propofol (DIPRIVAN) injection 10 mg/mL vial, 10-20 mg, Intravenous, Q30 Min PRN **AND** CK total, , , CONDITIONAL (SPECIFY) **AND** Triglycerides, , , CONDITIONAL (SPECIFY), Ashley Gerard MD     HYDROmorphone (PF) (DILAUDID) injection 0.3-0.5 mg, 0.3-0.5 mg, Intravenous, Q1H PRN, Ashley Gerard MD     norepinephrine (LEVOPHED) 16 mg in D5W 250 mL infusion, 0.03-1 mcg/kg/min, Intravenous, Continuous, Fletcher Guzman MD, Last Rate: 9.1 mL/hr at 02/02/18 1135, 0.15 mcg/kg/min at 02/02/18 1135     sennosides (SENOKOT) tablet 1-2 tablet, 1-2 tablet, Oral or Feeding Tube, BID, Anupama Fox MD     acetaminophen (TYLENOL) tablet 650 mg, 650 mg, Oral or Feeding Tube, Q4H PRN, Anupama Fox MD, 650 mg at 02/02/18 1145     allopurinol (ZYLOPRIM) tablet 300 mg, 300 mg, Oral or Feeding Tube, Daily, Anupama Fox MD, 300 mg at 02/02/18 1145     epoprostenol (VELETRI) 20 mcg/mL in sterile water inhalation solution, 20 ng/kg/min (Ideal), Nebulization, Continuous, [place],  Ashley Chaparro MD, Last Rate: 4.1 mL/hr at 02/02/18 1216, 20 ng/kg/min at 02/02/18 1216     vasopressin (PITRESSIN) 40 Units in D5W 40 mL infusion, 0.5-4 Units/hr, Intravenous, Continuous, Toby Gaines MD, Last Rate: 2.4 mL/hr at 02/02/18 1030, 2.4 Units/hr at 02/02/18 1030     hydrocortisone sodium succinate PF (solu-CORTEF) injection 100 mg, 100 mg, Intravenous, Q8H, Toby Gaines MD, 100 mg at 02/02/18 0619     phenylephrine (MITZI-SYNEPHRINE) 50 mg in NaCl 0.9 % 250 mL infusion, 0.5-6 mcg/kg/min, Intravenous, Continuous, Toby Gaines MD, Stopped at 02/02/18 0945     fentaNYL (SUBLIMAZE) infusion,  mcg/hr, Intravenous, Continuous, Toby Gaines MD, Last Rate: 2 mL/hr at 02/02/18 1205, 100 mcg/hr at 02/02/18 1205     midazolam (VERSED) 100 mg in sodium chloride 0.9% 100 mL infusion, 1-8 mg/hr, Intravenous, Continuous, Toby Gaines MD, Last Rate: 8 mL/hr at 02/02/18 1208, 8 mg/hr at 02/02/18 1208     pantoprazole (PROTONIX) 40 mg IV push injection, 40 mg, Intravenous, BID, Fletcher Guzman MD, 40 mg at 02/02/18 1146     dextrose 10 % 1,000 mL infusion, , Intravenous, Continuous PRN, Fletcher Guzman MD, Last Rate: 50 mL/hr at 02/02/18 1024     insulin 1 unit/mL in saline (NovoLIN, HumuLIN Regular) drip - ADULT IV Infusion, 0-24 Units/hr, Intravenous, Continuous, Fletcher Guzman MD, Stopped at 02/02/18 0430     glucose 40 % gel 15-30 g, 15-30 g, Oral, Q15 Min PRN **OR** dextrose 50 % injection 25-50 mL, 25-50 mL, Intravenous, Q15 Min PRN, 50 mL at 02/02/18 1006 **OR** glucagon injection 1 mg, 1 mg, Subcutaneous, Q15 Min PRN, Fletcher Guzman MD     LORazepam (ATIVAN) tablet 0.5-1 mg, 0.5-1 mg, Oral, Q6H PRN, Anupama Fox MD     LORazepam (ATIVAN) injection 0.5-1 mg, 0.5-1 mg, Intravenous, Q6H PRN, Anupama Fox MD, 0.5 mg at 02/01/18 1633     filgrastim (NEUPOGEN) injection 300 mcg, 300 mcg, Subcutaneous, Daily at 8 pm, Anupama Fox MD, 300  mcg at 02/02/18 0126     prednisoLONE acetate (PRED FORTE) 1 % ophthalmic susp 1 drop, 1 drop, Both Eyes, 4x Daily, Anupama Fox MD, 1 drop at 02/01/18 1236     MEDICATION INSTRUCTION, , Does not apply, Continuous PRN, Anupama Fox MD     methylPREDNISolone sodium succinate (solu-MEDROL) injection 125 mg, 125 mg, Intravenous, Once PRN, Anupama Fox MD     diphenhydrAMINE (BENADRYL) injection 50 mg, 50 mg, Intravenous, Once PRN, Anupama Fox MD     meperidine (DEMEROL) injection 25 mg, 25 mg, Intravenous, Q30 Min PRN, Anupama Fox MD     EPINEPHrine PF (ADRENALIN) injection 0.3 mg, 0.3 mg, Intramuscular, Q5 Min PRN, Anupama Fox MD     albuterol (PROAIR HFA/PROVENTIL HFA/VENTOLIN HFA) Inhaler 1-2 puff, 1-2 puff, Inhalation, Once PRN, Anupama Fox MD     albuterol neb solution 2.5 mg, 2.5 mg, Nebulization, Once PRN, Anupama Fox MD     0.9% sodium chloride infusion, 1,000 mL, Intravenous, Continuous PRN, Anupama Fox MD     traMADol (ULTRAM) half-tab 25-50 mg, 25-50 mg, Oral, Q6H PRN, Amparo Ontiveros APRN CNP, 25 mg at 01/29/18 1026     loperamide (IMODIUM) capsule 2 mg, 2 mg, Oral, TID PRN, Philomena Gordon MD, 2 mg at 01/29/18 1600     artificial saliva (BIOTENE DRY MOUTHWASH) liquid 5-10 mL, 5-10 mL, Swish & Spit, 4x Daily, Abigail Ryan PA-C, 10 mL at 02/01/18 0849     dextrose 10 % 1,000 mL infusion, , Intravenous, Continuous PRN, Anupama Fox MD     simethicone (MYLICON) chewable tablet  mg,  mg, Oral, 4x Daily PRN, Abigail Ryan PA-C     Medication Instruction, , Does not apply, Continuous PRN, Abigail Ryan PA-C     ondansetron (ZOFRAN) injection 8 mg, 8 mg, Intravenous, Q8H PRN **OR** ondansetron (ZOFRAN-ODT) ODT tab 8 mg, 8 mg, Oral, Q8H PRN **OR** ondansetron (ZOFRAN) tablet 8 mg, 8 mg, Oral, Q8H PRN, Abigail Ryan PA-C     prochlorperazine (COMPAZINE)  tablet 5-10 mg, 5-10 mg, Oral, Q6H PRN, 5 mg at 02/01/18 0551 **OR** prochlorperazine (COMPAZINE) injection 5-10 mg, 5-10 mg, Intravenous, Q6H PRN, Abigail Ryan PA-C, 10 mg at 01/29/18 1459     lidocaine (LMX4) kit, , Topical, Once PRN, Abigail Ryan PA-C     sodium chloride (PF) 0.9% PF flush 10-20 mL, 10-20 mL, Intracatheter, Q1H PRN, Abigail Ryan PA-C, 20 mL at 02/01/18 0532     sodium chloride (PF) 0.9% PF flush 10 mL, 10 mL, Intracatheter, Q7 Days, Abigail Ryan PA-C, 10 mL at 01/31/18 2032    Facility-Administered Medications Ordered in Other Encounters:      sodium chloride (PF) 0.9% PF flush 62 mL, 62 mL, Intravenous, Once, Daisy Ernandez MD     iopamidol (ISOVUE-370) 76% solution 86 mL, 86 mL, Intravenous, Once, Daisy Ernandez MD     barium sulfate (READI-CAT) oral suspension 2.1% 450 mL, 450 mL, Oral, Once, Daisy Ernandez MD     fluorodeoxyglucose F-18 (FDG) radioisotope injection 10.23 mCi, 10.23 mCi, Intravenous, Once, Daisy Ernandez MD    PHYSICAL EXAMINATION:  Temp:  [98.2  F (36.8  C)-100.3  F (37.9  C)] 100.3  F (37.9  C)  Pulse:  [96] 96  Heart Rate:  [] 130  Resp:  [18-42] 30  BP: ()/() 88/61  MAP:  [53 mmHg-131 mmHg] 72 mmHg  Arterial Line BP: ()/(33-93) 110/55  FiO2 (%):  [100 %] 100 %  SpO2:  [70 %-100 %] 93 %       General: intubated, sedated, non responsive in no acute distress.  HEENT: PERRL  Neck: no tyracheal deviation  Chest wall: Symmetric thorax.  Respiratory: intubated, coarse bilateral breath sounds  Cardiovascular: tachycardic  Gastrointestinal: Abdomen soft, distended, non tender, no visible scars  Genitourinary: castelan in place  Extremities: cooler to touch, palpable pulses  Skin: As noted above. No rashes or lesions appreciated.    LABS: Reviewed.   Arterial Blood Gases     Recent Labs  Lab 02/02/18  0947 02/02/18  0830 02/02/18  0553 02/02/18  0255   PH 7.45 7.31* 7.34* 7.33*    PCO2 49* 34* 33* 36   PO2 62* 64* 74* 71*   HCO3 34* 17* 18* 19*     Complete Blood Count     Recent Labs  Lab 02/02/18  0300 02/01/18  2240 02/01/18  2131 02/01/18  0531   WBC 0.2* 0.3* 0.6* 8.0   HGB 11.1* 12.7* 14.5 10.6*   PLT 55* 71* 90* 100*     Basic Metabolic Panel    Recent Labs  Lab 02/02/18  1000 02/02/18  0300 02/02/18  0010 02/01/18  2131    137 137 134   POTASSIUM 5.4* 5.0 4.5 5.2   CHLORIDE 98 96 94 93*   CO2 20 18* 21 13*   BUN 79* 74* 76* 89*   CR 2.34* 1.87* 1.66* 1.61*   GLC 64* 148* 203* 360*     Liver Function Tests    Recent Labs  Lab 02/02/18  0630 02/02/18  0536 02/01/18 2131 02/01/18  0531 01/31/18  0546 01/30/18  0825 01/29/18  0532   AST  --  4526* 150*  --  124*  --  79*   ALT  --  2848* 84*  --  21  --  24   ALKPHOS  --  40 66  --  46  --  57   BILITOTAL  --  1.3 0.8  --  0.4  --  0.5   ALBUMIN  --  2.6* 2.0*  --  2.3*  --  2.7*   INR 3.01*  --   --  1.06  --  1.11 1.18*     Pancreatic Enzymes  No lab results found in last 7 days.  Coagulation Profile    Recent Labs  Lab 02/02/18  0630 02/01/18  0531 01/30/18  0825 01/29/18  0532 01/27/18  0544   INR 3.01* 1.06 1.11 1.18* 1.21*   PTT 58*  --  25  --  30     Lactate  Invalid input(s): LACTATE    IMAGING:  Results for orders placed or performed during the hospital encounter of 01/24/18   XR Chest Port 1 View    Narrative    Exam: XR CHEST PORT 1 VW, 1/24/2018 9:30 PM    Indication: RN placed PICC - verify tip placement;     Comparison: 3/26/2014    Findings:   Right upper extremity PICC tip projects over the high right atrium.  Possible right basilar chest tube versus radiopaque object external to  the patient projecting over the right lower lung. The  cardiomediastinal silhouette and pulmonary vasculature are within  normal limits. Thoracic aortic atherosclerotic calcification. The  costophrenic angles are collimated off the image. Hazy right basilar  opacities. No pneumothorax.      Impression    Impression:   1. Right upper  extremity PICC tip projects over the high right atrium.  2. Hazy right basilar opacity likely represents a layering pleural  effusion.    I have personally reviewed the examination and initial interpretation  and I agree with the findings.    JR HENNESSY MD   CT Soft Tissue Neck w/o Contrast    Narrative    PET CT fusion examination 1/25/2018 1:28 PM  1. Neck CT without contrast  2. PET study of the neck  3. PET CT fusion study of the neck    History:  History of diffuse large B-cell lymphoma in 1994, follicular  lymphoma in 2013, now with recurrent diffuse large B-cell lymphoma.    Comparison: Neck CT 10/27/2014    Technique: Please refer to the accompanying whole body PET-CT for  report of the dose and whole body PET-CT findings.  Regarding the neck, axial images were obtained after nonionic  intravenous contrast administration, with sagittal and coronal  reconstructions performed. Neck CT images were reviewed in bone, soft  tissue, and lung windows, with review of the fused PET-CT images as  well in multiple planes.    Findings:  Confluent hypoattenuation in the periventricular white matter of the  brain, nonspecific and probably related to chronic small vessel  ischemic disease. Normal variant cavum septum pellucidum.    Evaluation of the mucosal space demonstrates no abnormality or  abnormal metabolic uptake on PET CT in the nasopharynx, oropharynx,  hypopharynx or the glottis. The tongue base appears normal. The major  salivary glands and thyroid gland appear normal.    Nonenlarged, hypermetabolic cervical lymph nodes, for example 0.7 x  0.8 cm right level 4 lymph node (series 1 image 92), which has an SUV  max of 6.0    Reversal of the normal cervical lordosis, with multilevel degenerative  changes with moderate spinal canal narrowing at the between C4-5 and  C6-C7. The visualized paranasal sinuses and mastoid air cells are  clear.     For findings in the visualized chest see separate report.       Impression    Impression:   In this patient with recurrent diffuse large B-cell lymphoma:  1. A few small hypermetabolic low cervical lymph nodes suspicious for  lymphomatous involvement.  2. Please refer to the whole body PET CT performed as a separate  report, for the findings of the remainder of the body.    I have personally reviewed the examination and initial interpretation  and I agree with the findings.    BRIAN CASTRO MD   PET Oncology Whole Body    Narrative    Combined Report of:    PET and CT on  1/25/2018 12:43 PM :    1. PET of the neck, chest, abdomen, and pelvis.  2. PET CT Fusion for Attenuation Correction and Anatomical  Localization:    3. Diagnostic CT scan of the chest, abdomen, and pelvis without  intravenous contrast for interpretation.  3. CT of the chest, abdomen and pelvis obtained for diagnostic  interpretation.  4. 3D MIP and PET-CT fused images were processed on an independent  workstation and archived to PACS and reviewed by a radiologist.    Technique:    1. PET: The patient received 10.21 mCi of F-18-FDG; the serum glucose  was 82 prior to administration, body weight was 59.8 kg. Images were  evaluated in the axial, sagittal, and coronal planes as well as the  rotational whole body MIP. Images were acquired from the Vertex to the  Feet.    UPTAKE WAS MEASURED AT 77 MINUTES.     2. CT: Volumetric acquisition for clinical interpretation of the  chest, abdomen, and pelvis acquired at 3 mm sections . The chest,  abdomen, and pelvis were evaluated at 5 mm sections in bone, soft  tissue, and lung windows.      The patient did not receive intravenous contrast. There did receive  oral contrast.    High resolution images of the neck were obtained with multiple oblique  projection reformats, and dictated in a separate report.    3. 3D MIP and PET-CT fused images were processed on an independent  workstation and archived to PACS and reviewed by a radiologist.    INDICATION: DLBCL, no  contrast due to BING;     ADDITIONAL INFORMATION OBTAINED FROM EMR: History of diffuse large  B-cell lymphoma in 1994, follicular lymphoma in 2013, now with  recurrent diffuse large B-cell lymphoma.    COMPARISON: Outside CT 1/16/2018    FINDINGS:     HEAD/NECK:  Please see separate report.    CHEST:  Extensive soft tissue thickening in the epicardial fat pad with marked  associated hypermetabolism (series 8 image 48). There is contiguous  extension of markedly hypermetabolic asymmetric soft tissue masslike  infiltration into the right costal cartilage at multiple levels,  especially the fourth through sixth right-sided costal cartilage  (series 8 image 44).    There are several enlarged hypermetabolic lymph nodes including 2.5 x  1.2 cm lymph node adjacent to the superior vena cava (series 8 image  22), which has an SUV max of 9.2, and 1.2 cm short axis diameter  subcarinal node (series 8 image 35), which has an SUV max of 7.7.    Heart is mildly enlarged. Moderate-severe coronary artery  calcifications. Mitral annular calcifications. Right-sided PICC tip  within the right atrium. Aneurysmal dilatation of the ascending  thoracic aorta up to 4.2 cm, previously 4.0 cm on 10/27/2014.  Pulmonary artery is normal in caliber. The central airways patent.  Large right-sided pleural effusion with associated atelectasis in the  right lung. Scattered calcified granulomas. Scattered tiny nodules  which are not definitely calcified, for example 2 mm right upper lobe  nodule (series 6 image 48).    ABDOMEN AND PELVIS:  Extensive markedly hypermetabolic soft tissue infiltration throughout  the peritoneal cavity, including relatively hypermetabolic masslike  thickening in the central mesentery extending from the pancreatic head  to the aortic bifurcation. Overall these findings do not appear to be  significantly changed 1/16/2018. There is relative sparing of the  retroperitoneum although this is certainly involved. Soft  tissue  thickening and abnormal FDG uptake extends along the right spermatic  cord.    The liver, adrenals there is not appear to be any substantial  involvement of the liver, spleen, adrenals or kidneys. The colon and  especially the small bowel are to large degree obscured, but are  normal in caliber.    LOWER EXTREMITIES:   No abnormal masses or hypermetabolic lesions.    BONES:   There are no suspicious lytic or blastic osseous lesions.  There is no  abnormal FDG uptake in the skeleton. Moderate ascites. The asymmetric  soft tissue thickening and hypermetabolism, involving, as described  above the costal cartilage at multiple levels on the right, as well as  the soft tissues of the right lateral chest wall between the ribs. The  ribs themselves do not appear to be involved. No evidence of marrow  involvement elsewhere. Presumed bone marrow biopsy changes in the  pelvis. Old right lateral 10th rib fracture. Degenerative endplate  changes at L4-L5.      Impression    IMPRESSION:   In this patient with recurrent diffuse large B-cell lymphoma there is  evidence of extensive lymphomatous infiltration:  1. Diffuse markedly hypermetabolic soft tissue thickening throughout  the peritoneal cavity including masslike thickening in the central  mesentery, with extension into the retroperitoneum and right spermatic  cord. This is compatible with diffuse lymphomatous infiltration.  2. Markedly hypermetabolic soft tissue thickening involving the  epicardial fat-pad extending to involve the costal cartilage at  multiple levels.  3. Hypermetabolic mediastinal lymph nodes consistent with lymphomatous  involvement.  4. Hypermetabolic soft tissue thickening involving the right costal  cartilage at multiple levels and the right lateral chest wall in the  rib spaces and pleura. The ribs themselves do not appear to be  involved.   5. There is no evidence of bone marrow involvement.  6. Large right-sided pleural effusion.  7. For  findings in the head and neck, please see separate report.    I have personally reviewed the examination and initial interpretation  and I agree with the findings.    BRIAN CASTRO MD   XR Chest Port 1 View    Narrative    Exam:  Chest X-ray 2/1/2018 2:39 PM    History: new onset SOB and cough, suspect pulm edema, hx HF and  weight/fluid up today;     Comparison: 1/24/2018    Findings: AP semiupright chest radiograph is obtained. Right sided  PICC with tip projecting over the mid SVC. Cardiac mediastinal  silhouette is unchanged. Aortic calcifications. Pulmonary vasculature  is mildly indistinct. Increased hazy opacity in the right lower lung  with silhouetting of the right hemidiaphragm, consistent with pleural  effusion. Right greater than left basilar opacities, increased  compared to prior. More focal perihilar interstitial opacity on the  left although edema could have this appearance its unilateral  appearance suggests an infection. No large left pleural effusion. No  pneumothorax. The upper abdomen is unremarkable.      Impression    Impression:   1.  Increased right-sided pleural effusion with associated right lower  lobe atelectasis/consolidation.  2.  Left lower lobe opacities, likely represent atelectasis.   3. Interstitial opacity primarily in the left midlung likely an  atypical infection. Edema stays in the differential.    I have personally reviewed the examination and initial interpretation  and I agree with the findings.    JESS PALMER MD   XR Chest Port 1 View    Narrative    XR CHEST PORT 1 VW  2/1/2018 5:51 PM      HISTORY: Endotracheal tube positioning;     COMPARISON: Earlier today 2/1/2018    FINDINGS: Right PICC in the lower SVC. Endotracheal tube tip is 6.1 cm  from the heraclio. Increased patchy airspace opacities in the left  perihilar region and lower left lung. No pneumothorax or pleural  effusion. Stable cardiomediastinal silhouette.      Impression    IMPRESSION:   1.  Endotracheal tube tip is 6.1 cm from heraclio.  2. Increased patchy airspace opacities in the left perihilar region  and in the left lower lung. Findings concerning for infection or  aspiration.    I have personally reviewed the examination and initial interpretation  and I agree with the findings.    DES LUGO MD   XR Abdomen Port 1 View    Narrative    XR ABDOMEN PORT F1 VW  2/1/2018 10:06 PM      HISTORY: ng;     COMPARISON: None    FINDINGS: Marked gaseous distention of the stomach. Gastric tube  sidehole projecting over the body of the stomach. Paucity of bowel gas  in the abdomen. Patchy airspace opacities in the left lower lung.  Marked lumbar spondylosis.      Impression    IMPRESSION:   1. Gastric tube sidehole projecting over the body of the stomach.  Marked gaseous distention of the stomach.  2. Patchy left lower lung airspace opacities, likely atelectasis  and/or infection.    I have personally reviewed the examination and initial interpretation  and I agree with the findings.    JESS PALMER MD   XR Chest Port 1 View    Narrative    XR CHEST PORT 1 VW  2/2/2018 4:26 AM      HISTORY: Line placement and MICHELLE;     COMPARISON: 2/1/2018    FINDINGS: ET tube tip 5 cm above the level of heraclio. Right IJ central  venous catheter tip projecting over the low SVC. Right arm PICC  projecting over the low SVC. Gastric tube partially seen. Normal heart  size. Slightly decreased patchy left airspace opacities and additional  right lower lung opacities. No pleural effusion or pneumothorax.      Impression    IMPRESSION:   1. Interval placement of right IJ central venous catheter tip at low  SVC.  2. Slightly decreased bilateral airspace opacities, pulmonary edema,  atelectasis and/or pneumonia.    I have personally reviewed the examination and initial interpretation  and I agree with the findings.    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 He is currently on Epinephrine, Norepi, Vaso, intubated with FIO2 of 100%, paralyzed with cisatracurium and on fentanyl/versed. He also had a dialysis catheter placed today. We were consulted to ECMO evaluation.   Given ongoing coagulopathy, malignancy and multiorgan failure, the risk of providing ECMO may outweigh the benefits, so at this time we will not provide ECMO.     Patient seen, findings and plan discussed with surgical ICU staff Dr. Foster.    Jagjit Pinedo        - - - - - - - - - - - - - - - - - - - - - - - - - - - - - - - - - - - - - - - - - - - - - - - - - - - - - - - - - - - - - - - - - - - - - - - -

## 2018-02-02 NOTE — PLAN OF CARE
Problem: Patient Care Overview  Goal: Plan of Care/Patient Progress Review  Outcome: Declining  D: DLBCL-on chemo, in ICU ventilated in respiratory failure.  I/A: Upon start of shift, pt vented 100% CMV 15P. On propofol 10mcg/kg, on Levo 0.06. BC drawn. Broad spectrum abx, antifungals. Full strength inhaled Veletri initiated. OG inserted, verified by X-ray. Insulin gtt initiated for elevated BG. Art line placed by MD. Hypotensive, propofol turned off, levophed titrated to keep MAP>65. Vent changes per MD order. PERRL, sluggish reaction. Steadily decreased UOP throughout shift. Lasix given-see MAR. No urine after 0000. Albumin, 500ml NS bolus given. Initiated vaso gtt, Patrick gtt to keep MAP>65. R IJ inserted, verified by chest x-ray. CVP 12. MD notified of critical labs. BG<100 @0430. Insulin gtt off. BG<70 @~0600-1 amp D50 given. Albumin and 1L NS bolus given @~0630.   P: Continue to monitor. Continue POC. Notify team of changes.

## 2018-02-02 NOTE — CONSULTS
[facility] Nephrology Progress Note      Tristen Beckwith MRN# [id number]   Age: 73 year old YOB: 1945           Assessment and Plan:   Assessment:  Tristen Beckwith is a 73 year old male with a medical history significant for follicular lymphoma in complete remission after 6 cycles bendamustine/rituxan by PET/CT and BMBx; as well as hypertension, coronary artery disease (anterior wall myocardial infarction in 2001 now s/p angioplasty and stent placement) and ischemic cardiomyopathy (last LVEF 48%) with congestive heart failure (ACC stage C NYHA class III); admitted to the hospital with from Oncology clinic with concerns for relapse of DLBCL. He had done well initially but he has now been admitted to the medical ICU after decompensating with severe acute hypoxic respiratory failure, acute kidney injury, lactic acidosis and evidence of multiorgan failure.  Nephrology has been consulted for management of anuric BING       #. Anuric Acute kidney injury:  Likely related to poor renal blood flow severe hypotension, possible septic shock, and possible cardiogenic shock with ARDS. He has been on four pressors since admission to the ICU. Unlikely to be related to drugs, contrast or chemotherapy.  He is now anuric and given worsening metabolic state, lactic acidosis with anuria and severe shock, he will need renal replacement therapy.        #. Volume status:  Volume overloaded based on chest imaging findings, mild CVP elevation. On four pressors to maintain BP.       #. Acid base status:       Severe lactic acidosis   Bicarb of 18 today initially, with corrected anion gap (for albumin) of 26.5. Delta gap of 11 signifies pure high AGMA (lactic acidosis),       #. Electrolytes:  Elevated potassium, elevated phosphorus. Low ionized calcium as well supports TLS, however Uric acid is normal. Rasburicase was given over a week ago, so it is unlikely that this is a falsely low uric acid.      #. Chronic kidney  injury:  Stage 3 CKD, likely secondary to hypertension.    Will manage BING as detailed above.      #. Recurrent DLBCL      Shock physiology   Management deferred to Primary team.              RECOMMENDATIONS:  -- Continue excellent cares by primary team   -- Commence CRRT today  -- Daily BMP  -- Daily weights, I/O  -- Monitor UOP  -- Monitor for TLS  -- Continue supportive cares  -- Management of other issues by primary team.          Patient seen and discussed with Dr. Weaver, who is in agreement with my assessment and recommedations. Please, see staff addendum for changes/additions to the plan.      Nita Gaines MD  Internal Medicine - Nephrology   PGY-3  [phone]        Interval History:   Nursing notes reviewed and noted.  In summary, Tristen Beckwith is a 73 year old male with a hx of B cell lymphoma s/p R-CHOP in 90s, now with follicular lymphoma, HTN, CAD s/p MI and stent, CKD currently admitted to the medical ICU after presenting with acute kidney injury and likely spontaneous tumor lysis progressing to multiorgan failure. He is currently on Epinephrine, Norepi, Vaso, intubated with FIO2 of 100%, paralyzed with cisatracurium and on fentanyl/versed.     The nephrology team had seen in ythe past for pre-renal BING secondary to poor intake. Today he is sedated and intubated, so no history can be obtained.     4 point ROS including Respiratory, CV, GI and , other than that noted in the HPI,  is negative          Medications:     Current Facility-Administered Medications   Medication     micafungin (MYCAMINE) 100 mg in NaCl 0.9 % 100 mL intermittent infusion     EPINEPHrine (ADRENALIN) 16 mg in NaCl 0.9 % 250 mL infusion     phytonadione (AQUA-MEPHYTON) 10 mg in NaCl 0.9 % 50 mL intermittent infusion     cisatracurium (NIMBEX) 200 mg in D5W 100 mL infusion     ascorbic acid 1,500 mg in NaCl 0.9 % 100 mL intermittent infusion     thiamine (B-1) 200 mg in NaCl 0.9 % 50 mL intermittent infusion     [START ON  2/3/2018] ciprofloxacin (CIPRO) infusion 400 mg     magnesium sulfate 2 g in NS intermittent infusion (PharMEDium or FV Cmpd)     sodium phosphate 20 mmol in NaCl 0.9 % intermittent infusion     potassium chloride 20 mEq in 50 mL intermittent infusion     PRE-filter replacement solution for CVVHD & CVVHDF (PrismaSol BGK 2/3.5)     POST-filter replacement solution for CVVHD & CVVHDF (PrismaSol BGK 2/3.5)     dialysate for CVVHD & CVVHDF (PrismaSol BGK 2/3.5)     albumin human 5 % injection 50 g     sodium bicarbonate 8.4 % injection 100 mEq     sodium bicarbonate (BICARB) 1 mEq/mL drip     calcium chloride injection 1 g     piperacillin-tazobactam (ZOSYN) 4.5 g in 20 mL NS Premix Syringe     [START ON 2/3/2018] vancomycin (VANCOCIN) 1,250 mg in NaCl 0.9 % 250 mL intermittent infusion     sodium chloride 0.9 % bag 500mL for CT scan flush use     albuterol neb solution 2.5 mg     morphine (PF) injection 2 mg     nitroGLYcerin (NITROSTAT) sublingual tablet 0.4 mg     naloxone (NARCAN) injection 0.1-0.4 mg     propofol (DIPRIVAN) infusion    And     propofol (DIPRIVAN) injection 10 mg/mL vial     HYDROmorphone (PF) (DILAUDID) injection 0.3-0.5 mg     norepinephrine (LEVOPHED) 16 mg in D5W 250 mL infusion     sennosides (SENOKOT) tablet 1-2 tablet     acetaminophen (TYLENOL) tablet 650 mg     allopurinol (ZYLOPRIM) tablet 300 mg     epoprostenol (VELETRI) 20 mcg/mL in sterile water inhalation solution     vasopressin (PITRESSIN) 40 Units in D5W 40 mL infusion     hydrocortisone sodium succinate PF (solu-CORTEF) injection 100 mg     phenylephrine (MITZI-SYNEPHRINE) 50 mg in NaCl 0.9 % 250 mL infusion     fentaNYL (SUBLIMAZE) infusion     midazolam (VERSED) 100 mg in sodium chloride 0.9% 100 mL infusion     pantoprazole (PROTONIX) 40 mg IV push injection     dextrose 10 % 1,000 mL infusion     insulin 1 unit/mL in saline (NovoLIN, HumuLIN Regular) drip - ADULT IV Infusion     glucose 40 % gel 15-30 g    Or     dextrose 50 %  "injection 25-50 mL    Or     glucagon injection 1 mg     LORazepam (ATIVAN) tablet 0.5-1 mg     LORazepam (ATIVAN) injection 0.5-1 mg     filgrastim (NEUPOGEN) injection 300 mcg     prednisoLONE acetate (PRED FORTE) 1 % ophthalmic susp 1 drop     MEDICATION INSTRUCTION     methylPREDNISolone sodium succinate (solu-MEDROL) injection 125 mg     diphenhydrAMINE (BENADRYL) injection 50 mg     meperidine (DEMEROL) injection 25 mg     EPINEPHrine PF (ADRENALIN) injection 0.3 mg     albuterol (PROAIR HFA/PROVENTIL HFA/VENTOLIN HFA) Inhaler 1-2 puff     albuterol neb solution 2.5 mg     0.9% sodium chloride infusion     traMADol (ULTRAM) half-tab 25-50 mg     loperamide (IMODIUM) capsule 2 mg     artificial saliva (BIOTENE DRY MOUTHWASH) liquid 5-10 mL     dextrose 10 % 1,000 mL infusion     simethicone (MYLICON) chewable tablet  mg     Medication Instruction     ondansetron (ZOFRAN) injection 8 mg    Or     ondansetron (ZOFRAN-ODT) ODT tab 8 mg    Or     ondansetron (ZOFRAN) tablet 8 mg     prochlorperazine (COMPAZINE) tablet 5-10 mg    Or     prochlorperazine (COMPAZINE) injection 5-10 mg     lidocaine (LMX4) kit     sodium chloride (PF) 0.9% PF flush 10-20 mL     sodium chloride (PF) 0.9% PF flush 10 mL     Facility-Administered Medications Ordered in Other Encounters   Medication     sodium chloride (PF) 0.9% PF flush 62 mL     iopamidol (ISOVUE-370) 76% solution 86 mL     barium sulfate (READI-CAT) oral suspension 2.1% 450 mL     fluorodeoxyglucose F-18 (FDG) radioisotope injection 10.23 mCi             Physical Exam:   Vitals were reviewed  Blood pressure (!) 88/61, pulse 96, temperature 100.4  F (38  C), temperature source Tympanic, resp. rate 30, height 1.727 m (5' 8\"), weight 64.7 kg (142 lb 9.6 oz), SpO2 93 %.    Intake/Output Summary (Last 24 hours) at 02/02/18 1319  Last data filed at 02/02/18 0900   Gross per 24 hour   Intake          3380.84 ml   Output             1245 ml   Net          2135.84 ml "     Vitals:    01/30/18 0945 01/31/18 0700 02/01/18 0728   Weight: 61.6 kg (135 lb 12.9 oz) 63.7 kg (140 lb 8 oz) 64.7 kg (142 lb 9.6 oz)       Physical Exam:    General: Sedated, intubated,    HEENT:ETT in place  CV: RRR, nl S1/S2, no S3/S4 appreciated, no m/r/g; intact & symmetric 3+ pulses (radial, DP)  Lungs: Noisy breathing   Abd: full, with masses noted in abdomen   : López  In situ  Ext: WWP, positive BLE edema  Skin: no rashes, cyanosis, or jaundice  MSK: no discernible joint tenderness/swelling,   Neuro: Sedated,          Data:   ROUTINE LABS (Last four results)  CMP  Recent Labs  Lab 02/02/18  1000 02/02/18  0536 02/02/18  0300 02/02/18  0010 02/01/18  2131 02/01/18  1715 02/01/18  0531  01/31/18  0546  01/29/18  0532     --  137 137 134 136 135  < > 141  < > 140   POTASSIUM 5.4*  --  5.0 4.5 5.2 4.0 4.2  < > 4.5  < > 4.1   CHLORIDE 98  --  96 94 93* 93* 94  < > 102  < > 99   CO2 20  --  18* 21 13* 28 29  < > 28  < > 20   ANIONGAP 22*  --  23* 22* 28* 15* 11  < > 11  < > 22*   GLC 64*  --  148* 203* 360* 292* 276*  < > 200*  < > 108*   BUN 79*  --  74* 76* 89* 65* 61*  < > 55*  < > 59*   CR 2.34*  --  1.87* 1.66* 1.61* 1.27* 1.11  < > 1.00  < > 1.15   GFRESTIMATED 28*  --  36* 41* 42* 56* 65  < > 74  < > 63   GFRESTBLACK 33*  --  43* 50* 51* 67 79  < > 89  < > 76   MILY 7.3*  --  6.1* 6.5* 7.0* 6.9* 7.2*  < > 7.6*  < > 8.4*   MAG 1.5*  --  1.6  --  2.0  --  1.8  --  2.1  < > 2.4*   PHOS  --  9.1*  --   --  11.7* 8.8* 5.6*  < > 4.8*  < > 3.6   PROTTOTAL  --  4.6*  --   --  4.6*  --   --   --  4.8*  --  5.2*   ALBUMIN  --  2.6*  --   --  2.0*  --   --   --  2.3*  --  2.7*   BILITOTAL  --  1.3  --   --  0.8  --   --   --  0.4  --  0.5   ALKPHOS  --  40  --   --  66  --   --   --  46  --  57   AST  --  4526*  --   --  150*  --   --   --  124*  --  79*   ALT  --  2848*  --   --  84*  --   --   --  21  --  24   < > = values in this interval not displayed.  CBC  Recent Labs  Lab 02/02/18  8016  02/01/18  2240 02/01/18  2131 02/01/18  0531   WBC 0.2* 0.3* 0.6* 8.0   RBC 3.64* 4.14* 4.79 3.45*   HGB 11.1* 12.7* 14.5 10.6*   HCT 34.1* 38.4* 44.8 32.5*   MCV 94 93 94 94   MCH 30.5 30.7 30.3 30.7   MCHC 32.6 33.1 32.4 32.6   RDW 14.7 14.5 14.5 14.3   PLT 55* 71* 90* 100*     INR  Recent Labs  Lab 02/02/18  0630 02/01/18  0531 01/30/18  0825 01/29/18  0532   INR 3.01* 1.06 1.11 1.18*     Arterial Blood Gas  Recent Labs  Lab 02/02/18  1225 02/02/18  0947 02/02/18  0830 02/02/18  0745 02/02/18  0553   PH 7.18* 7.45 7.31*  --  7.34*   PCO2 33* 49* 34*  --  33*   PO2 84 62* 64*  --  74*   HCO3 12* 34* 17*  --  18*   O2PER 100 100 100 100.0 100.0     I&O's: I/O last 3 completed shifts:  In: 1984.25 [I.V.:1384.25; IV Piggyback:500]  Out: 1545 [Urine:1545]

## 2018-02-03 NOTE — PROGRESS NOTES
MD and RN at bedside, called to room to extubate patient. Patient suctioned, cuff deflated, and extubated per MD order.

## 2018-02-03 NOTE — PROGRESS NOTES
SPIRITUAL HEALTH SERVICES  [facility] 4C  ON-CALL VISIT     REFERRAL SOURCE: referred to pt's family by bedside nurse - pt dying.     I offered support to spouse and friends with their questions about  homes. Pt's son was able to successfully make arrangements.     PLAN: no further needs expressed by family.     Quinn Jasmine) Dimple Villalobos M.Div., [facility]  Staff   Pager [phone]

## 2018-02-03 NOTE — PROGRESS NOTES
Patient tenuous all day.  Attempt at CRRT in afternoon failed.  Patient blood pressure dropped to 30/30, dialysis stopped and epinephrine bolused.  Family informed of patient's unstable state and DNR order received.  Measures taken to attempt to keep patient alive long enough for his brother to arrive and see him.  His brother's flight lands at 1920 but the patient  at earlier despite best efforts.  Report given to oncoming nurse.  Body currently being cleaned and [employer] to be called to  body and take to family's home.

## 2018-02-03 NOTE — PROGRESS NOTES
Death Note    Called to see patient for unresponsiveness. On exam no spontaneous movement was observed. Patient did not respond to verbal or noxious stimuli. Absent heart and breath sounds for more than 1 minute. Pupils were fixed and dilated. Corneal reflex was absent.     Patient pronounced dead at 2/2/18 @1904. Family at bedside including wife and two sons. They declined autopsy.     Michelle Caraballo MD  Medicine-Pediatrics, PGY-3

## 2018-02-03 NOTE — DISCHARGE SUMMARY
[facility], [place]    Death Summary  [facility] Intensive Care    Date of Admission:  2018  Date of Death:         2018  Provider Completing Death Summary: Toby Gaines MD PGY-3    Discharge Diagnoses   Acute hypoxemic respiratory failure  Severe ARDS  Hypotension secondary to Septic Shock and Cardiogenic Shock  Type II Myocardial Ischemia  Klebsiella pneumoniae bacteremia  Anuric Acute Kidney Injury on CKD III  Metabolic Acidosis  Lactic Acidosis  Diffuse Large B-Cell Lymphoma, colon   Fulminant Hepatic Failure  Tumor Lysis Syndrome     History of Present Illness   Tristen Beckwith is an 73 year old male with a PMH of diffuse large B-cell lympoma status post R-CHOP in the mid- complicated by Cytoxan-induced pneumonitis in remission but with relapses of follicular lymphoma (most recently treated in ), anterior wall MI in  status post stenting of the LAD, CKD III who was admitted on  for treatment of tumor lysis syndrome, acute kidney injury and induction of chemotherapy for diffuse large B cell lymphoma of the colon diagnosed late January. Patient became more dyspneic on 18 with worsening hypoxia and agitation leading to concern for airway maintenance. He was intubated on the floor and transferred to the ICU where he continued to decompensate over the course of 24 hours. Patient was too unstable for life prolonging interventions and family opted to make DNR and patient  despite maximum life sustaining interventions.    Hospital Course   Tristen Beckwith was admitted on 2018.  The following problems were addressed during his hospitalization:    #. Acute hypoxemic respiratory failure  #. Severe ARDS  Patient notably more short of breath on 18 requiring more supplemental oxygen to keep sats greater tahn 90%. He was treated with 40 mg IV x2 without improvement and oxygen requirements continued to worsen. Patient started to become more altered  and concern for impending respiratory collapse so patient was intubated on the floor and transferred to the ICU. Initially believed his worsening respiratory status was due to pulmonary edema. His oxygen requirements continued to worsen while intubated and eventually on max oxygenation with PEEP of 15 and FiO2 of 100%. Patient was found to be in severe ARDS, but was temporarily stabilized with increased sedation with fentanyl and midazolam as well as the addition of inhaled epoprostenol. Discussion surround possible proning positioning to help recruit more lung, however, patient unable to tolerate CRRT and therefore decision was made that further interventions would offer little help as acidosis continued to worsen. Patient was made DNR and  on .      #. Hypotension secondary to combination of septic shock and cardiogenic shock  #. Type II Myocardial Ischemia  There was high suspicion for septic shock and cardiogenic shock based on oxyhemoglobin results from central access that put CI at 1.3 and CO at 2.28 and patient was initially cold extremities on exam at one point during transfer to ICU. Transthoracic echocardiogram from  showed decreased LVEF 45-50% and new apical wall akinesis, presumably from old anterior wall MI. Repeat echocardiogram on  was consistent with significantly reduced ejection fraction from prior at 32% and concern for stress cardiomyopathy due to critical illness/sepsis. Initially some concern that acutely reduced ejection fraction was due to ischemia given troponin elevation, but troponin elevation fit with demand ischemia. He was treated with broad spectrum antibiotics including Vancomycin, Zosyn, Tobramycin, Micafungin, Ciprofloxacin. His pressor requirements continued to increase and initially placed on norepinephrine, vasopressin, and phenylephrine, but after consultation with cardiology, felt that phenylephrine would merely worsen heart failure and to switch to  epinephrine for more inotropic support. Patient was also started on stress dose steroids with Hydrocortisone 100 mg every 8 hours. However, despite this aggressive pressor management, patient encountered cardiac arrest with acute drop in pressure and drop in heart rate. Per patient request and discussion with family at bedside, it was decided that he was DNAR. Thus, all drips were discontinued after note of cardiac arrest.     #. Klebsiella pneumoniae bacteremia  Blood cultures were obtained during patient's acute presentation to the ICU to determine source of respiratory distress. Culture was positive for Klebsiella pneumoniae that was overall sensitive except for resistance to ampicillin. Given the extensiveness of patient's lymphoma in the colon, felt that organism likely translocated to the blood stream from the gut. Patient had been on the appropriate antibiotics as organism was susceptible to Pip-Tazo.     #. Anuric Acute Kidney Injury on CKD III  Patient presented on 1/24 with BING that was pre-renal in the setting of poor PO intake, diuretics and contrast-induced nephropathy. Creatinine at that time was 2.31 that improved to normal baseline, however, acutely worsened in the setting of shock. Patient became anuric and developed worsening metabolic acidosis from elevated lactate. Attempted CRRT, but at initiation, blood pressure dropped from systolics of 140 to 30/17 and required epinephrine bolus to get better perfusion. Given his inability to tolerated CRRT due to hypotension despite three vasopressors, it was deemed that further life sustaining interventions (proning, VV ECMO, VA ECMO) would be futile.     #. Metabolic Acidosis  #. Lactic Acidosis  Patient's lactate continued to rise despite fluid resuscitation and pressor medications. There was likely some component of Type B lactic acidosis given the underlying lymphoma and TLS, but the main etiology was shock from sepsis and heart failure. As above,  "patient unable to tolerate CRRT and lactate continued to worsen despite bicarbonate infusion with several boluses as well.     #. Diffuse Large B-Cell Lymphoma - Colon  #. Tumor Lysis Syndrome  #. Pancytopenia  Diagnosed in mid 1990s s/p R-CHOP c/b Cytoxan-induced pneumonitis. Patient also had relapses of follicular lymphoma, treated most recently in 2013 w/ bendamustine and rituximab. CT abd pelvis on 1/16 w/ contrast demonstrated new marked enlargement of head and proximal body of pancreas with surrounding edema and diffuse wall thickening throughout the colon. PET- CT on 1/25 with \"soft tissue thickening throughout the peritoneal cavity including masslike thickening in the central mesentery, with extension into the retroperitoneum and right spermatic cord, soft tissue thickening of the epicardial fat-pad extending to involve the costal cartilage, Hypermetabolic mediastinal lymph nodes, soft tissue thickening involving the right costal cartilage at multiple levels and the right lateral chest wall in therib spaces and pleura, small hypermetabolic low cervical lymph nodes, no evidence of bone marrow involvement.\" Late Jan with biopsy from colonoscopy resulted in Diffuse Large B Cell germinal center subtype. Given his recent diagnosis, there was a high risk of perforation but there were not active signs of bleeding noted. Patient received steroids with methylprednisolone 100mg IV on 1/25 and completed 1/29. Received rituxan 1/29, infused very slowly d/t mild reaction (HTN, clammy, nausea). Tolerated oxaliplatin on 1/30 and cytarabine on 1/31. Patient was kept in the hospital given concerns for lab findings of tumor lysis syndrome that was treated with allopurinol and rasburicase. Patient ultimately became pancytopenic as well with sharp drop in WBC from 8.0 -> 0.6 over the course of the day on 2/1 which likely allowed for rapid spread of infection.     #. Fulminant Hepatic Failure  Patient's AST and ALT acutely " "worsened to several thousands overnight following transfer to the ICU and consistent with \"shock liver\" presentation. Had signs of coagulopathy with elevated INR and reduced fibrinogen with markedly elevated LDH. Complete liver failure given INR elevation and hypoglycemia.    Cause of death: Septic Shock Secondary To Klebsiella pneumoniae bacteremia    Toby Gaines MD    Significant Results and Procedures    Physical Exam  No spontaneous movement was observed.   Patient did not respond to verbal or noxious stimuli.   Absent heart and breath sounds for more than 1 minute.   Pupils were fixed and dilated.   Corneal reflex was absent.     AB.18/33/84/12    AST: 4526  ALT: 2848    Lactic Acid: 13.8    LDH: 7142 -> 96452    BNP: 10,071    Trop: 0.116 -> 0.307 -> 0.357 -> 0.720    WBC: 8.0 -> 0.6 -> 0.3 -> 0.2 -> 0.4    INR: 1.06 -> 3.01 -> 4.84    Fibrinogen: 132 -> 96    Primary Care Physician   Daisy Ernandez    Consultations This Hospital Stay   VASCULAR ACCESS ADULT IP CONSULT  CARDIOLOGY GENERAL ADULT IP CONSULT  NEPHROLOGY GENERAL ADULT IP CONSULT  VASCULAR ACCESS CARE ADULT IP CONSULT  PHARMACY/NUTRITION TO START AND MANAGE TPN  PHARMACY IP CONSULT  PHYSICAL THERAPY ADULT IP CONSULT  COLORECTAL SURGERY ADULT IP CONSULT  PHARMACY TO DOSE VANCO  VASCULAR ACCESS CARE ADULT IP CONSULT  PHARMACY IP CONSULT  NEPHROLOGY GENERAL ADULT IP CONSULT  PHARMACY IP CONSULT  [facility] IP CONSULT    Time Spent on this Encounter   IToby, personally saw the patient today and spent greater than 30 minutes discharging this patient.    Data   All labs and imaging reviewed in EPIC   "

## 2018-02-03 NOTE — PROGRESS NOTES
"                MEDICAL INTENSIVE CARE UNIT PROGRESS NOTE          Subjective:   Overnight events were significant for patient requiring escalating oxygen requirements early after intubation. Initially felt this could be related to flash pulmonary edema, however, growing concern for ARDS. Patient continued to decompensate and started on norepi, vasopressin, as well as phenylephrine. He was started on broad spectrum antibiotics. Also started on flolan and deep sedation with consideration for possible proning given worsening oxygenation issues. There was then concern that patient may have component of cardiogenic shock and TTE performed with acutely worsened EF. Patient continues to worsen throughout the day and several rapids called to patient, especially with start of CRRT, which patient did not tolerate. Family updated at bedside and are aware of patient's critical status and unable to intervene further. Patient made DNR with aim of continuing aggressive cares in hopes that family able to make it in to see patient from [place].           Objective:   Most recent vital signs:  BP (!) 88/61  Pulse 96  Temp 97.6  F (36.4  C) (Tympanic)  Resp 30  Ht 1.727 m (5' 8\")  Wt 64.7 kg (142 lb 9.6 oz)  SpO2 (!) 79%  BMI 21.68 kg/m2  Temp:  [97.5  F (36.4  C)-100.4  F (38  C)] 97.6  F (36.4  C)  Heart Rate:  [] 117  Resp:  [24-30] 30  BP: ()/(25-99) 88/61  MAP:  [53 mmHg-131 mmHg] 94 mmHg  Arterial Line BP: ()/(33-97) 136/64  FiO2 (%):  [80 %-100 %] 100 %  SpO2:  [70 %-100 %] 79 %  Wt Readings from Last 2 Encounters:   02/01/18 64.7 kg (142 lb 9.6 oz)   01/24/18 58.2 kg (128 lb 4.8 oz)   Ventilation Mode: CMV/AC  FiO2 (%): 100 %  Rate Set (breaths/minute): 30 breaths/min  Tidal Volume Set (mL): 450 mL  PEEP (cm H2O): 15 cmH2O  Oxygen Concentration (%): 100 %  Resp: 30    Intake/Output Summary (Last 24 hours) at 02/02/18 1902  Last data filed at 02/02/18 1900   Gross per 24 hour   Intake          " 6630.38 ml   Output               70 ml   Net          6560.38 ml       Date 02/02/18 0700 - 02/03/18 0659   Shift 6669-7985 7953-2702 7805-1488 24 Hour Total   I  N  T  A  K  E   P.O. 0 0  0    I.V. 1716.96 779.17  2496.13    NG/GT 50   50    IV Piggyback 1000   1000    Colloid 100 500  600    Blood Components 300 216  516    Shift Total  (mL/kg) 3166.96  (48.96) 1495.17  (23.12)  4662.13  (72.08)   O  U  T  P  U  T   Urine 0   0    Shift Total  (mL/kg) 0  (0)   0  (0)   Weight (kg) 64.68 64.68 64.68 64.68     Physical exam:  General: intubated, sedated   HEENT: EOMI, PERRLA, no scleral icterus or injection  CARDIAC: tachycardic, regular rhythm, no m/r/g  RESP: diffuse crackles  GI: NT/ND, bowel sounds active  : castelan in place  EXTREMITIES: NO LE edema or edema on the back   SKIN: No acute lesions appreciated  NEURO: sedated, no focal neurological deficits noted    Labs (Past three days):  Labs reviewed in EPIC and abnormalities noted in assessment and plan.           Assessment/Plan:   Tristen Beckwith is a 73 year old male with a PMHx significant for diffuse large B-cell lymphoma s/p R-CHOP in the mid 90s c/b Cytoxan-induced pneumonitis in remission but w/ relapses of follicular lymphoma, treated most recently in 2013, anterior wall MI in 2000 s/p stenting of the LAD, and CKD III who was admitted on 1/21 for treatment of TLS, BING, and induction of chemotherapy for diffuse large B cell lymphoma of the colon which was diagnosed late last month. He was improving in terms of his TLS but suffered hypercarbic, hypoxemic respiratory failure on 2/1 requiring intubation, thought to be 2/2 flash pulmonary edema versus ARDS.     =====NEUROLOGY/PSYCHIATRY=====  #. Analgesia/Sedation: Fentanyl and Versed    =====PULMONARY=====  #. Severe ARDS  PF ratio less than 100. Flash pulmonary edema vs ARDS; less likely PE, less likely infection; s/p 120mg Lasix over the course of the afternoon prior to admit to the ICU. Currently anuric  following fluids and diuresis. Patient started on flolan as well as deeply sedated with some improvement overnight into morning of 2/2. Was considering paralyzing patient, proning, and possibly performing VV ECMO, however, patient unable to tolerate CRRT and if unable to tolerate this given renal failure and worsening acidosis, there is little hope for survival and further interventions are unlikely to be life saving.   -- continue fentanyl and versed  -- paralyze patient  -- continue flolan    Ventilation Mode: CMV/AC  FiO2 (%): 100 %  Rate Set (breaths/minute): 30 breaths/min  Tidal Volume Set (mL): 450 mL  PEEP (cm H2O): 15 cmH2O  Oxygen Concentration (%): 100 %  Resp: 30    =====CARDIOVASCULAR=====  #. Hypotension 2/2 Septic Shock vs Cardiogenic Shock  TTE on 1/24:  LVEF of 45-50% and apical wall akinesis. TTE overnight indicative of stress cardiomyopathy. EF estimated to be reduced from prior at 32%. SvO2 off the central line are low at 29 with CI of 1.3 and CO of 2.28. Patient is warm in extremities currently and overall picture more indicative of a septic shock patient, now with stress induced cardiomyopathy. The troponins are likely from stress response on heart and not indicative of true ACS, furthermore, too unstable for further work up such as LHC. Given mixed picture of septic shock and cardiogenic shock, will favor some inotropic support and less purely vasoconstrictive medications that could worsen heart failure and CI/CO. Rapidly expanding LDH is indicative of severe end organ ischemia and failure.   -- Cardiology following, appreciate recs  -- continue norepi and epi  -- 100 mg Hydrocortisone q 8 hour  -- Hold metoprolol, lisinopril, home torsemide     #. CAD s/p large anterior wall MI 2000 s/p KRYSTAL to LAD  #. Type II MI  Trop initially 0.116 w/ EKG not convincing for ischemia. Trop peak at 0.720 and stopped checking lab as little benefit. Nuclear MPI in 2015 showed large fixed perfusion defect in the  mid anterior and apical wall, no inducible ischemia. TTE as above with reduced ejection fraction.   -- discontinue ASA 81     =====INFECTIOUS DISEASE=====   #. Klebsiella pneumoniae bacteremia  #. Septic Shock  Given cancer affecting GI tract with large burden and likely large tumor lysis, this GI tract organism likely translocated from that source. Patient is now on multiple pressors with worsening status.     Cultures:   2/1/18 Blood culture: K. Pneumonia     Antimicrobial To Date:  -- Vancomycin  -- Pip-Tazo  -- micafungin  -- Ciprofloxacin  -- tobramycin    =====RENAL=====  # Anuric BING on CKD  Baseline creatinine 1.3 - 1.6 with ever increasing creatinine in the setting of anuric state. Worsening acidosis and CRRT initiated, but patient's blood pressure dropped to 30/17 with initiation of lowest flow possible.  - Nephrology consulted, appreciate assistance  - Strict I's and O's     # Metabolic Acidosis  # Lactic Acidosis  Patient no longer responding to CRRT. Little hope for survival at this point given septic shock causing worsening tissue hypoperfusion with multiple organ failure.   -- continue bicarb drip and pushes as needed    =====GASTROENTEROLOGY=====  #Diffuse-large B cell lymphoma of colon  High risk of perforation, not signs of active bleeding currently.  - IV PPI BID for now w/ acute decompensation and reports of bloody secretions    #. Fulminant Hepatic Failure 2/2 Shock Liver  Patient's AST and ALT acutely worsened to several thousands overnight. Signs of coagulopathy with elevated INR and reduced fibrinogen and complete liver failure given INR elevation and hypoglycemia. Patient is obviously not a transplant candidate given septic shock.     #. Nutrition: NPO  #. Bowel regimen: none    =====ENDOCRINE=====  # Hypoglycemia  Sign of end stage liver disease.   -- continue D10     =====HEMATOLOGY/ONCOLOGY=====  #Diffuse large B-cell lymphoma, now w/ recurrence in the  Diagnosed in mid 1990s s/p R-CHOP  "c/b Cytoxan-induced pneumonitis   Patient also had relapses of follicular lymphoma, treated most recently in  w/ bendamustine and rituximab  CT abd pelvis on  w/ contrast demonstrated new marked enlargement of head and proximal body of pancreas with surrounding edema and diffuse wall thickening throughout the colon  PET- CT on : \"soft tissue thickening throughout the peritoneal cavity including masslike thickening in the central mesentery, with extension into the retroperitoneum and right spermatic cord, soft tissue thickening of the epicardial fat-pad extending to involve the costal cartilage, Hypermetabolic mediastinal lymph nodes, soft tissue thickening involving the right costal cartilage at multiple levels and the right lateral chest wall in therib spaces and pleura, small hypermetabolic low cervical lymph nodes, no evidence of bone marrow involvement.\"  Late - Biopsy from colonoscopy resulted in Diffuse Large B Cell germinal center subtype.     #TLS   Had been improving on the heme/onc floor s/p rasburicase.     =====RHEUMATOLOGY/MSK=====  #. No acute issues      F/E/N:  -- no mIVF  -- replete electrolytes as needed  -- NPO    Ppx:  -- ulcer: heparin sub q  -- VTE: IV PPI    Lines/Devices:  RIJ, Left Femoral dialysis line, R Radial A-line, López    Code: DNR    Dispo: Patient likely to  overnight on  into 2/3 with plan to pursue comfort cares once brother is able to arrive.     Plan of care was discussed w/ Dr. Barfield.    Toby Gaines MD  PGY3 Internal Medicine  P) [phone]    "

## 2018-02-03 NOTE — PLAN OF CARE
Problem: Patient Care Overview  Goal: Plan of Care/Patient Progress Review  Outcome: Change based on patient need/priority Date Met: 02/02/18    Pt with B cell lymphoma/sepsis, code status changed to DNR this afternoon after pt further declining despite aggressive measures. Pt death pronounced at 1904 this evening. Pt's body bathed. Pt's family (wife, 2 sons, and brother) at bedside, support provided and questions answered. [employer] called and coordinated transport of body to home. [employer] staff and security arrived at 2300 and body released to them for transport. Belongings sent home with family.

## 2018-02-04 LAB
BACTERIA SPEC CULT: ABNORMAL
SPECIMEN SOURCE: ABNORMAL

## 2018-02-05 LAB — INTERPRETATION ECG - MUSE: NORMAL

## 2018-02-06 ASSESSMENT — ENCOUNTER SYMPTOMS
CHILLS: 0
FEVER: 0
HYPOTENSION: 0

## 2019-09-16 NOTE — PROCEDURES
"Procedure/Surgery Information   [facility], [place]    Bedside Procedure Note  Date of Service (when I performed the procedure): 02/02/2018    Tristen Beckwith is a 73 year old male patient.  1. Acute respiratory failure with hypoxia (H)    2. Diffuse large B-cell lymphoma, unspecified body region (H)    3. Nodular lymphoma of extranodal and/or solid organ site (H)      Past Medical History:   Diagnosis Date     Coronary artery disease      H/O percutaneous transluminal coronary angioplasty      History of acute anterior wall myocardial infarction      Temp: 99  F (37.2  C) Temp src: Axillary BP: 94/61 Pulse: 96 Heart Rate: 96 Resp: 30 SpO2: 97 % O2 Device: Mechanical Ventilator Oxygen Delivery: 7 LPM    Central line  Date/Time: 2/2/2018 4:25 AM  Performed by: YARIEL SIMONS  Authorized by: YARIEL SIMONS   Consent: Written consent obtained.  Risks and benefits: risks, benefits and alternatives were discussed  Consent given by: spouse  Patient understanding: patient states understanding of the procedure being performed  Patient consent: the patient's understanding of the procedure matches consent given  Procedure consent: procedure consent matches procedure scheduled  Relevant documents: relevant documents present and verified  Test results: test results available and properly labeled  Site marked: the operative site was marked  Imaging studies: imaging studies available  Required items: required blood products, implants, devices, and special equipment available  Patient identity confirmed: arm band and hospital-assigned identification number  Time out: Immediately prior to procedure a \"time out\" was called to verify the correct patient, procedure, equipment, support staff and site/side marked as required.  Indications: vascular access and central pressure monitoring    Sedation:  Patient sedated: yes  Sedatives: midazolam  Analgesia: fentanyl  Vitals: Vital signs were monitored during " sedation.  Preparation: skin prepped with chlorhexidine  Location details: right internal jugular  Patient position: flat  Catheter type: triple lumen  Pre-procedure: landmarks identified  Ultrasound guidance: yes  Number of attempts: 1  Successful placement: yes  Post-procedure: line sutured and dressing applied           Fletcher Guzman   Statement Selected Statement Selected Statement Selected Statement Selected Statement Selected Statement Selected Statement Selected Statement Selected

## (undated) RX ORDER — FENTANYL CITRATE 50 UG/ML
INJECTION, SOLUTION INTRAMUSCULAR; INTRAVENOUS
Status: DISPENSED
Start: 2018-01-01